# Patient Record
Sex: FEMALE | Race: WHITE | NOT HISPANIC OR LATINO | Employment: OTHER | URBAN - METROPOLITAN AREA
[De-identification: names, ages, dates, MRNs, and addresses within clinical notes are randomized per-mention and may not be internally consistent; named-entity substitution may affect disease eponyms.]

---

## 2017-01-17 ENCOUNTER — ALLSCRIPTS OFFICE VISIT (OUTPATIENT)
Dept: OTHER | Facility: OTHER | Age: 72
End: 2017-01-17

## 2017-01-17 DIAGNOSIS — E55.9 VITAMIN D DEFICIENCY: ICD-10-CM

## 2017-01-17 DIAGNOSIS — E03.9 HYPOTHYROIDISM: ICD-10-CM

## 2017-01-17 DIAGNOSIS — Z12.31 ENCOUNTER FOR SCREENING MAMMOGRAM FOR MALIGNANT NEOPLASM OF BREAST: ICD-10-CM

## 2017-01-19 ENCOUNTER — APPOINTMENT (OUTPATIENT)
Dept: LAB | Facility: CLINIC | Age: 72
End: 2017-01-19
Payer: MEDICARE

## 2017-01-19 DIAGNOSIS — E03.9 HYPOTHYROIDISM: ICD-10-CM

## 2017-01-19 DIAGNOSIS — E55.9 VITAMIN D DEFICIENCY: ICD-10-CM

## 2017-01-19 LAB
25(OH)D3 SERPL-MCNC: 25.9 NG/ML (ref 30–100)
ALBUMIN SERPL BCP-MCNC: 3.7 G/DL (ref 3.5–5)
ALP SERPL-CCNC: 105 U/L (ref 46–116)
ALT SERPL W P-5'-P-CCNC: 32 U/L (ref 12–78)
ANION GAP SERPL CALCULATED.3IONS-SCNC: 6 MMOL/L (ref 4–13)
AST SERPL W P-5'-P-CCNC: 26 U/L (ref 5–45)
BILIRUB SERPL-MCNC: 0.39 MG/DL (ref 0.2–1)
BUN SERPL-MCNC: 16 MG/DL (ref 5–25)
CALCIUM SERPL-MCNC: 8.8 MG/DL (ref 8.3–10.1)
CHLORIDE SERPL-SCNC: 104 MMOL/L (ref 100–108)
CHOLEST SERPL-MCNC: 211 MG/DL (ref 50–200)
CO2 SERPL-SCNC: 29 MMOL/L (ref 21–32)
CREAT SERPL-MCNC: 0.94 MG/DL (ref 0.6–1.3)
GFR SERPL CREATININE-BSD FRML MDRD: 59 ML/MIN/1.73SQ M
GLUCOSE SERPL-MCNC: 84 MG/DL (ref 65–140)
HDLC SERPL-MCNC: 61 MG/DL (ref 40–60)
LDLC SERPL CALC-MCNC: 121 MG/DL (ref 0–100)
POTASSIUM SERPL-SCNC: 4.6 MMOL/L (ref 3.5–5.3)
PROT SERPL-MCNC: 7.5 G/DL (ref 6.4–8.2)
SODIUM SERPL-SCNC: 139 MMOL/L (ref 136–145)
TRIGL SERPL-MCNC: 143 MG/DL
TSH SERPL DL<=0.05 MIU/L-ACNC: 6.55 UIU/ML (ref 0.36–3.74)

## 2017-01-19 PROCEDURE — 80061 LIPID PANEL: CPT

## 2017-01-19 PROCEDURE — 84443 ASSAY THYROID STIM HORMONE: CPT

## 2017-01-19 PROCEDURE — 36415 COLL VENOUS BLD VENIPUNCTURE: CPT

## 2017-01-19 PROCEDURE — 80053 COMPREHEN METABOLIC PANEL: CPT

## 2017-01-19 PROCEDURE — 82306 VITAMIN D 25 HYDROXY: CPT

## 2017-01-20 ENCOUNTER — GENERIC CONVERSION - ENCOUNTER (OUTPATIENT)
Dept: OTHER | Facility: OTHER | Age: 72
End: 2017-01-20

## 2017-02-14 ENCOUNTER — HOSPITAL ENCOUNTER (OUTPATIENT)
Dept: RADIOLOGY | Age: 72
Discharge: HOME | End: 2017-02-14
Payer: MEDICARE

## 2017-02-14 DIAGNOSIS — Z12.31 ENCOUNTER FOR SCREENING MAMMOGRAM FOR MALIGNANT NEOPLASM OF BREAST: ICD-10-CM

## 2017-02-14 PROCEDURE — G0202 SCR MAMMO BI INCL CAD: HCPCS

## 2017-02-15 ENCOUNTER — GENERIC CONVERSION - ENCOUNTER (OUTPATIENT)
Dept: OTHER | Facility: OTHER | Age: 72
End: 2017-02-15

## 2017-04-21 ENCOUNTER — GENERIC CONVERSION - ENCOUNTER (OUTPATIENT)
Dept: OTHER | Facility: OTHER | Age: 72
End: 2017-04-21

## 2017-04-21 LAB — 25(OH)D3 SERPL-MCNC: 41.9 NG/ML (ref 30–100)

## 2017-04-22 LAB — TSH SERPL DL<=0.05 MIU/L-ACNC: 5.66 UIU/ML (ref 0.45–4.5)

## 2017-04-27 ENCOUNTER — GENERIC CONVERSION - ENCOUNTER (OUTPATIENT)
Dept: OTHER | Facility: OTHER | Age: 72
End: 2017-04-27

## 2017-04-27 DIAGNOSIS — E03.9 HYPOTHYROIDISM: ICD-10-CM

## 2017-10-27 ENCOUNTER — GENERIC CONVERSION - ENCOUNTER (OUTPATIENT)
Dept: OTHER | Facility: OTHER | Age: 72
End: 2017-10-27

## 2017-10-28 LAB — TSH SERPL DL<=0.05 MIU/L-ACNC: 5.96 UIU/ML (ref 0.45–4.5)

## 2017-10-30 ENCOUNTER — GENERIC CONVERSION - ENCOUNTER (OUTPATIENT)
Dept: OTHER | Facility: OTHER | Age: 72
End: 2017-10-30

## 2018-01-11 NOTE — RESULT NOTES
Message   Mammogram stable- repeat 1 year  Verified Results  * MAMMO SCREENING BILATERAL W CAD [id number] 09:30AM Kevin Anderson Order Number: [id number]    - Patient Instructions: To schedule this appointment, please contact Central Scheduling at [phone]  Do not wear any perfume, powder, lotion or deodorant on breast or underarm area  Please bring your doctors order, referral (if needed) and insurance information with you on the day of the test  Failure to bring this information may result in this test being rescheduled  Arrive 15 minutes prior to your appointment time to register  On the day of your test, please bring any prior mammogram or breast studies with you that were not performed at a [facility]  Failure to bring prior exams may result in your test needing to be rescheduled  Test Name Result Flag Reference   MAMMO SCREENING BILATERAL W CAD (Report)     Patient History:   Patient is postmenopausal and is nulliparous  No known family history of cancer  Patient has never smoked  Patient's BMI is 28 9  Reason for exam: screening, asymptomatic  Mammo Screening Bilateral W CAD: February 14, 2017 - Check In #:    [de-identified]   Bilateral MLO, CC, and XCCL view(s) were taken  Technologist: EBONI Rushing (EBONI)(M)   Prior study comparison: December 1, 2015, digital bilateral    screening mammogram performed at [address] [place] [address]  November 25, 2014, digital bilateral screening mammogram    performed at [address] [place] [address]  November 19, 2013,    digital bilateral screening mammogram performed at [address] [place] [address]  November 21, 2012, right breast unilateral    diagnostic mammogram, performed at [address] [facility]  [facility]  November 12, 2012, digital bilateral screening mammogram   performed at [address] [place] [address]   November 7, 2011,    digital bilateral screening mammogram performed at [facility]      There are scattered fibroglandular densities  The parenchymal pattern appears stable  No dominant soft tissue    mass or suspicious calcifications are noted  The skin and nipple   contours are within normal limits  No mammographic evidence of malignancy  No    significant changes when compared with prior studies  ACR BI-RADS® Assessments: BiRad:1 - Negative     Recommendation:   Routine screening mammogram in 1 year  A reminder letter will be   scheduled  Analyzed by CAD     8-10% of cancers will be missed on mammography  Management of a    palpable abnormality must be based on clinical grounds  Patients   will be notified of their results via letter from our facility  Accredited by [employer] of [employer] and FDA       Transcription Location: [facility] 98: [id number]     Risk Value(s):   Tyrer-Cuzick 10 Year: 2 700%, Tyrer-Cuzick Lifetime: 4 700%,    Myriad Table: 1 5%, BERTRAND 5 Year: 1 9%, NCI Lifetime: 5 9%   Signed by:   Ward Jin MD   2/14/17

## 2018-01-11 NOTE — PROGRESS NOTES
Assessment    1  Medicare annual wellness visit, subsequent (V70 0) (Z00 00)   2  Situational anxiety (300 09) (F41 8)   3  Encounter for preventive health examination (V70 0) (Z00 00)   4  Essential hypertension (401 9) (I10)   5  Hyperlipidemia (272 4) (E78 5)   6  Hypothyroidism (244 9) (E03 9)   7  Screening for colon cancer (V76 51) (Z12 11)    Plan  Advance directive discussed with patient    · We recommend that you create an advance directive ; Status:Complete - Retrospective  By Protocol Authorization;   Done: 34MJW1978   · *VB - Fall Risk Assessment  (Dx Z13 89 Screen for Neurologic Disorder); Status:Active -  Retrospective Authorization; Requested OLP:[id number];    · *VB - Urinary Incontinence Screen (Dx Z13 89 Screen for UI); Status:Active -  Retrospective Authorization; Requested IUU:[id number];   Encounter for screening mammogram for breast cancer    · * MAMMO SCREENING BILATERAL W CAD; Status:Hold For - Scheduling; Requested  for:17Jan2017;   Hypothyroidism    · (1) COMPREHENSIVE METABOLIC PANEL; Status:Active; Requested AQX:[id number];    · (1) LIPID PANEL, FASTING; Status:Active; Requested PTJ:[id number];    · (1) TSH; Status:Active; Requested REP:[id number];   Hypothyroidism, Mild vitamin D deficiency    · (1) VITAMIN D 25-HYDROXY; Status:Active; Requested LFU:[id number];   Screening for colon cancer    · COLONOSCOPY; Status:Active; Requested for:[id number];   Situational anxiety    · ALPRAZolam 0 25 MG Oral Tablet; TAKE 1 TABLET TWICE DAILY AS NEEDED    Discussion/Summary    TRIAL OF ALPRAZOLAM PRN  IF NO BETTER CONSIDER ESCITALOPRAM  OBTAIN LABS  MAMMO DUE  PAP UP TO DATE  IMM UP TO DATE  FOLLOW UP 6 MONTHS BP STABLE  Impression: Subsequent Annual Wellness Visit, with preventive exam as well as age and risk appropriate counseling completed       Cardiovascular screening and counseling: counseling was given on maintaining a healthy diet, counseling was given on maintaining a healthy weight and Dx - V81 2 Screen for CV Disorder  Diabetes screening and counseling: Dx - V77 1 Screen for DM  Patient Discussion: plan discussed with the patient, follow-up visit needed in one year  Chief Complaint  PATIENT HERE FOR ANNUAL MEDICARE WELLNESS       History of Present Illness  HPI: PATIENT HERE FOR ANNUAL MEDICARE WELLNESS; SHE IS HAVING SOME ANXIETY LATELY WITH SOME SOCIAL ISSUES;   Welcome to [employer] and Wellness Visits: The patient is being seen for the subsequent annual wellness visit  Medicare Screening and Risk Factors   Hospitalizations: no previous hospitalizations  Once per lifetime medicare screening tests: ECG has not been done  Medicare Screening Tests Risk Questions   Abdominal aortic aneurysm risk assessment: none indicated  Osteoporosis risk assessment: , female gender and HAD DEXA 1 YEAR AGO  HIV risk assessment: none indicated  Drug and Alcohol Use: The patient has never smoked cigarettes  The patient reports never drinking alcohol  Alcohol concern:   The patient has no concerns about alcohol abuse  She has never used illicit drugs  Diet and Physical Activity: Current diet includes well balanced meals  She exercises daily  Exercise: walking, stretching, strength training  Mood Disorder and Cognitive Impairment Screening: She denies feeling down, depressed, or hopeless over the past two weeks  She denies feeling little interest or pleasure in doing things over the past two weeks  Cognitive impairment screening: denies difficulty learning/retaining new information, denies difficulty handling complex tasks, denies difficulty with reasoning, denies difficulty with spatial ability and orientation, denies difficulty with language and denies difficulty with behavior  Functional Ability/Level of Safety: Hearing is normal bilaterally   The patient is currently able to do activities of daily living without limitations, able to do instrumental activities of daily living without limitations, able to participate in social activities without limitations and able to drive without limitations  Activities of daily living details: does not need help using the phone, no transportation help needed, does not need help shopping, no meal preparation help needed, does not need help doing housework, does not need help doing laundry, does not need help managing medications and does not need help managing money  Home safety risk factors:  no unfamiliar surroundings, no poor household lighting, no uneven floors, no household clutter, grab bars in the bathroom and handrails on the stairs  Advance Directives: Advance directives: living will, durable power of  for health care directives and advance directives  I agree with the patient's decisions  Co-Managers and Medical Equipment/Suppliers: See Patient Care Team   Reviewed Updated [facility]:   Last Medicare Wellness Visit Information was reviewed, patient interviewed, no change since last [facility]  Preventive Quality Program 65 and Older: Falls Risk: The patient fell 0 times in the past 12 months  The patient is currently asymptomatic Symptoms Include: The patient currently has no urinary incontinence symptoms  Date of last glaucoma screen was 2016      Patient Care Team    Care Team Member Role Specialty Office Number   [address]  Optometry [phone]   Dirk Miles MD  Orthopedic Surgery [phone]     Review of Systems    Constitutional: negative and no malaise  Head and Face: no facial pressure  Eyes: WEARS GLASSES, but negative, no eye pain, eyes not red, no watery discharge from the eyes and no purulent discharge from the eyes  ENT: negative, no earache, no hearing loss, no sore throat, no scratchy throat and no hoarseness  Cardiovascular: negative, no chest pain, no palpitations, the heart is not racing and no lightheadedness     Respiratory: negative, no shortness of breath, no wheezing, not sleeping upright or with extra pillows, no cough and no productive cough  Gastrointestinal: negative, no abdominal pain, no abdominal bloating, no nausea, no vomiting, no constipation and no bright red blood per rectum  Genitourinary: negative, no dysuria and no urinary frequency  Musculoskeletal: OCC JOINT PAIN;, but negative, no diffuse joint pain, no generalized muscle aches, no joint stiffness and no pain in other joints  Integumentary and Breasts: negative, no rashes, no skin lesions, no itching, no erythema, no edema and no skin scaling  Neurological: negative, no headache, no confusion and no dizziness  Psychiatric: SITUATIONAL ANXIETY - RESOLVES, but negative and no insomnia  Endocrine: negative  Hematologic and Lymphatic: negative, no swollen glands and no swollen glands in the neck  Active Problems    1  Adhesive capsulitis of right shoulder (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 0) (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)   2  Advance directive discussed with patient (V65 49) (Z71 89)   3  Borderline osteopenia (733 90) (M85 80)   4  Essential hypertension (401 9) (I10)   5  Hyperlipidemia (272 4) (E78 5)   6  Hypothyroidism (244 9) (E03 9)   7  Medicare annual wellness visit, subsequent (V70 0) (Z00 00)   8  Moderate osteopenia (733 90) (M85 80)    Past Medical History    · History of  (637 90) (O03 9)   · History of Diverticulosis (562 10) (K57 90)   · History of  2 (V22 2) (Z33 1)   · History of Osteopenia (733 90) (M85 80)   · History of Pregnancy (V22 2) (Z33 1)    The active problems and past medical history were reviewed and updated today  Surgical History    · History of Knee Arthroscopy   · History of Neuroplasty 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   · History of Surgically Induced     The surgical history was reviewed and updated today         Family History  Mother    · Family history of Hypertension (V17 49)   · Family history of Hypothyroidism   · Family history of Osteoarthritis (V17 7)   · Family history of Stroke Syndrome (V17 1)  Father    · Family history of Diabetes Mellitus (V18 0)   · Family history of Hypertension (V17 49)   · Family history of Osteoarthritis (V17 7)   · Family history of Stroke Syndrome (V17 1)  Sister    · Family history of Hypothyroidism  Brother    · Family history of Diabetes Mellitus (V18 0)    The family history was reviewed and updated today  Social History    · Being A Social Drinker   · Caffeine Use   · Exercising Erratically   · Former smoker (E45 25) (O41 837)   · Denied: History of Uses drugs daily  The social history was reviewed and updated today  The social history was reviewed and is unchanged  Current Meds   1  Aspirin 81 MG TABS Recorded   2  Betamethasone Sod Phos & Acet 6 (3-3) MG/ML Injection Suspension; 1 ml; To Be Done:   [id number]; Status: HOLD FOR - Administration Ordered   3  Biotin 10 MG Oral Tablet Recorded   4  Calcium 600+D 600-400 MG-UNIT Oral Tablet Recorded   5  Co Q-10 CAPS Recorded   6  [employer] Fish Oil CAPS Recorded   7  [employer]S Vitamin E CAPS Recorded   8  Ferrous Sulfate 325 (65 Fe) MG Oral Tablet Recorded   9  Lisinopril 10 MG Oral Tablet; take 1 tablet by mouth every day; Therapy: 14MPG8576 to ([facility])  Requested for: 46DWE0222; Last   Rx:[id number] Ordered   10  Magnesium Citrate TABS Recorded   11  Rosuvastatin Calcium 10 MG Oral Tablet; TAKE AS DIRECTED 2 DAYS EACH WEEK; Therapy: 61AWZ3860 to (Last Rx:27Gdg0506)  Requested for: 02Tgx1892 Ordered   12  Vitamin C 250 MG Oral Tablet Recorded    Allergies    1  Bactrim TABS   2   Penicillins    Immunizations   1 2 3 4    Influenza  08-Jan-2013  (67y) 19-Dec-2013  (68y) 22-Dec-2014  (69y) 23-Dec-2015  (70y)    PCV  23-Dec-2015  (70y)       PPSV  19-Dec-2013  (68y)       Td/DT  28-Jan-2014  (68y)       Varicella  2010        Vitals  Signs    Temperature: 96 6 F  Heart Rate: 62  Respiration: 14  Systolic: 498  Diastolic: 72  Height: 5 ft   Weight: 148 lb 12 8 oz  BMI Calculated: 29 06  BSA Calculated: 1 64    Physical Exam    Constitutional   General appearance: No acute distress, well appearing and well nourished  well developed, appears healthy and well nourished  Eyes   Conjunctiva and lids: No swelling, erythema or discharge  Pupils and irises: Equal, round, reactive to light  Pupils: equal, round, and reactive to light bilaterally  Cornea, Lens, and Sclera: Bilateral eyes: normal    Ears, Nose, Mouth, and Throat   External inspection of ears and nose: Normal     Otoscopic examination: Tympanic membranes translucent with normal light reflex  Canals patent without erythema  Hearing: Normal   HEARING AIDS /B/L WITH CERUMEIN -CERUMEN REMOVED B/L WITH NORMAL TM B/L  Nasal mucosa, septum, and turbinates: Normal without edema or erythema  Lips, teeth, and gums: Normal, good dentition  Oropharynx: Normal with no erythema, edema, exudate or lesions  Inspection of the oropharynx showed fully visible tonsils, uvula and soft palate (Mallampati class 1)  The palate examination showed no abnormalities  The tongue was normal  The tonsils were normal    Neck   Neck: Supple, symmetric, trachea midline, no masses  Thyroid: Normal, no thyromegaly  NO THYROMEGALY  Pulmonary   Respiratory effort: No increased work of breathing or signs of respiratory distress  Percussion of chest: Normal     Auscultation of lungs: Clear to auscultation  Auscultation of the lungs revealed no expiratory wheezing, normal expiratory time and no inspiratory wheezing  no rales or crackles were heard bilaterally  no rhonchi  no friction rub  no wheezing  no diminished breath sounds  no bronchial breath sounds  Cardiovascular   Palpation of heart: Normal PMI, no thrills  Auscultation of heart: Normal rate and rhythm, normal S1 and S2, no murmurs  The heart rate was normal  The rhythm was regular  Heart sounds: normal S1, normal S2, no S3 and no S4  no murmurs were heard  Carotid pulses: 2+ bilaterally    NO BRUITS NOTED  Abdominal aorta: Normal     Femoral pulses: 2+ bilaterally  Pedal pulses: 2+ bilaterally  Examination of extremities for edema and/or varicosities: Normal     Abdomen   Abdomen: Non-tender, no masses  Liver and spleen: No hepatomegaly or splenomegaly  Lymphatic   Palpation of lymph nodes in neck: No lymphadenopathy  Musculoskeletal   Gait and station: Normal     Digits and nails: Normal without clubbing or cyanosis  Joints, bones, and muscles: Normal     Range of motion: Normal     Stability: Normal     Muscle strength/tone: Normal     Skin   Skin and subcutaneous tissue: Normal without rashes or lesions  Palpation of skin and subcutaneous tissue: Normal turgor  Neurologic   Cranial nerves: Cranial nerves II-XII intact  Reflexes: 2+ and symmetric  Sensation: No sensory loss  Psychiatric   Judgment and insight: Normal     Orientation to person, place, and time: Normal     Recent and remote memory: Intact  Mood and affect: Normal        Results/Data  PHQ-9 Adult Depression Screening [id number] 09:32AM User, [facility]     Test Name Result Flag Reference   PHQ-9 Adult Depression Score 8     Over the last two weeks, how often have you been bothered by any of the following problems? Little interest or pleasure in doing things: Not at all - 0  Feeling down, depressed, or hopeless: Several days - 1  Trouble falling or staying asleep, or sleeping too much: Several days - 1  Feeling tired or having little energy: Several days - 1  Poor appetite or over eating: Not at all - 0  Feeling bad about yourself - or that you are a failure or have let yourself or your family down: More than half the days - 2  Trouble concentrating on things, such as reading the newspaper or watching television: Several days - 1  Moving or speaking so slowly that other people could have noticed   Or the opposite -  being so fidgety or restless that you have been moving around a lot more than usual: Not at all - 0  Thoughts that you would be better off dead, or of hurting yourself in some way: More than half the days - 2   PHQ-9 Adult Depression Screening Negative     PHQ-9 Difficulty Level Somewhat difficult     PHQ-9 Severity Mild Depression         Signatures   Electronically signed by :  Chris Venegas DO; Jan 17 2017 10:18AM EST                       (Author)

## 2018-01-13 VITALS
HEART RATE: 62 BPM | HEIGHT: 60 IN | BODY MASS INDEX: 29.21 KG/M2 | DIASTOLIC BLOOD PRESSURE: 72 MMHG | SYSTOLIC BLOOD PRESSURE: 136 MMHG | RESPIRATION RATE: 14 BRPM | TEMPERATURE: 96.6 F | WEIGHT: 148.8 LBS

## 2018-01-13 NOTE — RESULT NOTES
Verified Results  (1) TSH 21Apr2017 07:56AM Matheus Morales     Test Name Result Flag Reference   TSH 5 660 uIU/mL H 0 450-4 500     (1) VITAMIN D 25-HYDROXY 21Apr2017 07:56AM Matheus Morales     Test Name Result Flag Reference   Vitamin D, 25-Hydroxy 41 9 ng/mL  30 0-100 0   Vitamin D deficiency has been defined by the [facility] [address] practice guideline as a  level of serum 25-OH vitamin D less than 20 ng/mL (1,2)  The Endocrine Society went on to further define vitamin D  insufficiency as a level between 21 and 29 ng/mL (2)  1  IOM ([employer])  2010  Dietary reference     intakes for calcium and D  [facility]: The     1[employer]Way  2  Randy MF, Ana María KEANE, Daisy MONK, et al      Evaluation, treatment, and prevention of vitamin D     deficiency: an Endocrine Society clinical practice     guideline  JCEM  2011 Jul; 96(7):1911-30  Plan  Hypothyroidism    · (1) TSH; Status:Active;  Requested for:27Apr2017;

## 2018-01-15 NOTE — RESULT NOTES
Message   PLS SEND RESULT LETTER     Verified Results  (1) COMPREHENSIVE METABOLIC PANEL [id number] 93:50EB Laura [place]     Test Name Result Flag Reference   GLUCOSE,RANDM 87 mg/dL     If the patient is fasting, the ADA then defines impaired fasting glucose as > 100 mg/dL and diabetes as > or equal to 123 mg/dL  SODIUM 138 mmol/L  136-145   POTASSIUM 4 2 mmol/L  3 5-5 3   CHLORIDE 103 mmol/L  100-108   CARBON DIOXIDE 29 mmol/L  21-32   ANION GAP (CALC) 6 mmol/L  4-13   BLOOD UREA NITROGEN 21 mg/dL  5-25   CREATININE 0 83 mg/dL  0 60-1 30   Standardized to IDMS reference method   CALCIUM 8 6 mg/dL  8 3-10 1   BILI, TOTAL 0 68 mg/dL  0 20-1 00   ALK PHOSPHATAS 104 U/L     ALT (SGPT) 28 U/L  12-78   AST(SGOT) 20 U/L  5-45   ALBUMIN 3 6 g/dL  3 5-5 0   TOTAL PROTEIN 6 8 g/dL  6 4-8 2   eGFR Non-African American      >60 0 ml/min/1 73sq [facility] Disease Education Program recommendations are as follows:  GFR calculation is accurate only with a steady state creatinine  Chronic Kidney disease less than 60 ml/min/1 73 sq  meters  Kidney failure less than 15 ml/min/1 73 sq  meters  (1) LIPID PANEL, FASTING [id number] 09:32AM Laura Matt     Test Name Result Flag Reference   CHOLESTEROL 206 mg/dL H    HDL,DIRECT 49 mg/dL  40-60   Specimen collection should occur prior to Metamizole administration due to the potential for falsely depressed results  LDL CHOLESTEROL CALCULATED 130 mg/dL H 0-100   Triglyceride:         Normal              <150 mg/dl       Borderline High    150-199 mg/dl       High               200-499 mg/dl       Very High          >499 mg/dl  Cholesterol:         Desirable        <200 mg/dl      Borderline High  200-239 mg/dl      High             >239 mg/dl  HDL Cholesterol:        High    >59 mg/dL      Low     <41 mg/dL  LDL CALCULATED:    This screening LDL is a calculated result    It does not have the accuracy of the Direct Measured LDL in the monitoring of patients with hyperlipidemia and/or statin therapy  Direct Measure LDL (GGD605) must be ordered separately in these patients  TRIGLYCERIDES 134 mg/dL  <=150   Specimen collection should occur prior to N-Acetylcysteine or Metamizole administration due to the potential for falsely depressed results  (1) TSH [id number] 09:32AM [facility]     Test Name Result Flag Reference   TSH 3 150 uIU/mL  0 358-3 740   Patients undergoing fluorescein dye angiography may retain small amounts of fluorescein in the body for 48-72 hours post procedure  Samples containing fluorescein can produce falsely depressed TSH values  If the patient had this procedure,a specimen should be resubmitted post fluorescein clearance  The recommended reference ranges for TSH during pregnancy are as follows:  First trimester 0 1 to 2 5 uIU/mL  Second trimester  0 2 to 3 0 uIU/mL  Third trimester 0 3 to 3 0 uIU/m       Discussion/Summary   LABS ARE STABLE

## 2018-01-15 NOTE — RESULT NOTES
Discussion/Summary   Thyroid level remains just about the same- we can continue to monitor it or start oral medications if you have any symptoms of hypothyroidism  Verified Results  (1) TSH 27Oct2017 08:23AM Fadi Agustintal     Test Name Result Flag Reference   TSH 5 960 uIU/mL H 0 450-4 500       Plan  Hypothyroidism    · (1) TSH WITH FT4 REFLEX; Status:Active;  Requested for:30Apr2018;

## 2018-01-16 NOTE — RESULT NOTES
Message   TSH is slightly elevated -  repeat in 3 months;  vitamin d is low- add 2000 iu daily and follow up labs in 3 months  Verified Results  (1) COMPREHENSIVE METABOLIC PANEL [id number] 77:95YD [place] Ort   TW Order Number: [id number]     Test Name Result Flag Reference   GLUCOSE,RANDM 84 mg/dL     If the patient is fasting, the ADA then defines impaired fasting glucose as > 100 mg/dL and diabetes as > or equal to 123 mg/dL  SODIUM 139 mmol/L  136-145   POTASSIUM 4 6 mmol/L  3 5-5 3   CHLORIDE 104 mmol/L  100-108   CARBON DIOXIDE 29 mmol/L  21-32   ANION GAP (CALC) 6 mmol/L  4-13   BLOOD UREA NITROGEN 16 mg/dL  5-25   CREATININE 0 94 mg/dL  0 60-1 30   Standardized to IDMS reference method   CALCIUM 8 8 mg/dL  8 3-10 1   BILI, TOTAL 0 39 mg/dL  0 20-1 00   ALK PHOSPHATAS 105 U/L     ALT (SGPT) 32 U/L  12-78   AST(SGOT) 26 U/L  5-45   ALBUMIN 3 7 g/dL  3 5-5 0   TOTAL PROTEIN 7 5 g/dL  6 4-8 2   eGFR Non-African American 59 0 ml/min/1 73sq m     - Patient Instructions: This is a fasting blood test  Water,black tea or black  coffee only after 9:00pm the night before test Drink 2 glasses of water the morning of test - Patient Instructions: This bloodwork is non-fasting  Please drink two glasses of   water morning of bloodwork  National Kidney Disease Education Program recommendations are as follows:  GFR calculation is accurate only with a steady state creatinine  Chronic Kidney disease less than 60 ml/min/1 73 sq  meters  Kidney failure less than 15 ml/min/1 73 sq  meters  (1) LIPID PANEL, FASTING [id number] 07:49AM Sebastian Ort   TW Order Number: [id number]     Test Name Result Flag Reference   CHOLESTEROL 211 mg/dL H    HDL,DIRECT 61 mg/dL H 40-60   Specimen collection should occur prior to Metamizole administration due to the potential for falsely depressed results  LDL CHOLESTEROL CALCULATED 121 mg/dL H 0-100   - Patient Instructions:  This is a fasting blood test  Water,black tea or black  coffee only after 9:00pm the night before test   Drink 2 glasses of water the morning of test     - Patient Instructions: This is a fasting blood test  Water,black tea or black  coffee only after 9:00pm the night before test Drink 2 glasses of water the morning of test - Patient Instructions: This bloodwork is non-fasting  Please drink two glasses of   water morning of bloodwork  Triglyceride:         Normal              <150 mg/dl       Borderline High    150-199 mg/dl       High               200-499 mg/dl       Very High          >499 mg/dl  Cholesterol:         Desirable        <200 mg/dl      Borderline High  200-239 mg/dl      High             >239 mg/dl  HDL Cholesterol:        High    >59 mg/dL      Low     <41 mg/dL  LDL CALCULATED:    This screening LDL is a calculated result  It does not have the accuracy of the Direct Measured LDL in the monitoring of patients with hyperlipidemia and/or statin therapy  Direct Measure LDL (XQN029) must be ordered separately in these patients  TRIGLYCERIDES 143 mg/dL  <=150   Specimen collection should occur prior to N-Acetylcysteine or Metamizole administration due to the potential for falsely depressed results  (1) TSH [id number] 07:49AM Bishop Singh    Order Number: [id number]     Test Name Result Flag Reference   TSH 6 550 uIU/mL H 0 358-3 740   - Patient Instructions: This bloodwork is non-fasting  Please drink two glasses of water morning of bloodwork  - Patient Instructions: This is a fasting blood test  Water,black tea or black  coffee only after 9:00pm the night before test Drink 2 glasses of water the morning of test - Patient Instructions: This bloodwork is non-fasting  Please drink two glasses of   water morning of bloodwork  Patients undergoing fluorescein dye angiography may retain small amounts of fluorescein in the body for 48-72 hours post procedure   Samples containing fluorescein can produce falsely depressed TSH values  If the patient had this procedure,a specimen should be resubmitted post fluorescein clearance  The recommended reference ranges for TSH during pregnancy are as follows:  First trimester 0 1 to 2 5 uIU/mL  Second trimester  0 2 to 3 0 uIU/mL  Third trimester 0 3 to 3 0 uIU/m     (1) VITAMIN D 25-HYDROXY [id number] 07:49AM Ivet Posada    Order Number: [id number]     Test Name Result Flag Reference   VIT D 25-HYDROX 25 9 ng/mL L 30 0-100 0   This assay is a certified procedure of the CDC Vitamin D Standardization Certification Program (VDSCP)     Deficiency <20ng/ml   Insufficiency 20-30ng/ml   Sufficient  ng/ml     *Patients undergoing fluorescein dye angiography may retain small amounts of fluorescein in the body for 48-72 hours post procedure  Samples containing fluorescein can produce falsely elevated Vitamin D values  If the patient had this procedure, a specimen should be resubmitted post fluorescein clearance  Plan  Hypothyroidism    · (1) TSH; Status:Active; Requested DZL:[id number];   Mild vitamin D deficiency    · (1) VITAMIN D 25-HYDROXY; Status:Active;  Requested for:20Apr2017;

## 2018-01-18 ENCOUNTER — ALLSCRIPTS OFFICE VISIT (OUTPATIENT)
Dept: OTHER | Facility: OTHER | Age: 73
End: 2018-01-18

## 2018-01-18 DIAGNOSIS — I10 ESSENTIAL (PRIMARY) HYPERTENSION: ICD-10-CM

## 2018-01-18 DIAGNOSIS — E55.9 VITAMIN D DEFICIENCY: ICD-10-CM

## 2018-01-18 DIAGNOSIS — Z12.31 ENCOUNTER FOR SCREENING MAMMOGRAM FOR MALIGNANT NEOPLASM OF BREAST: ICD-10-CM

## 2018-01-18 DIAGNOSIS — E78.5 HYPERLIPIDEMIA: ICD-10-CM

## 2018-01-18 DIAGNOSIS — E03.9 HYPOTHYROIDISM: ICD-10-CM

## 2018-01-23 VITALS
WEIGHT: 148 LBS | RESPIRATION RATE: 16 BRPM | DIASTOLIC BLOOD PRESSURE: 76 MMHG | TEMPERATURE: 95.1 F | BODY MASS INDEX: 29.06 KG/M2 | HEIGHT: 60 IN | SYSTOLIC BLOOD PRESSURE: 148 MMHG | HEART RATE: 78 BPM

## 2018-01-23 NOTE — PROGRESS NOTES
Assessment    1  Medicare annual wellness visit, subsequent (V70 0) (Z00 00)   2  Hyperlipidemia (272 4) (E78 5)   3  Hypothyroidism (244 9) (E03 9)   4  Mild vitamin D deficiency (268 9) (E55 9)    Plan  Encounter for screening mammogram for breast cancer    · * MAMMO SCREENING BILATERAL W CAD; Status:Hold For - Scheduling; Requested  for:18Jan2018;   Essential hypertension, Hyperlipidemia, Hypothyroidism, Mild vitamin D deficiency    · (1) LIPID PANEL, FASTING; Status:Active; Requested for:18Jan2018;   Hypothyroidism, Mild vitamin D deficiency    · (1) TSH; Status:Active; Requested for:18Jan2018;   Mild vitamin D deficiency    · (1) COMPREHENSIVE METABOLIC PANEL; Status:Active; Requested ROSANNA:[id number];    · (1) VITAMIN D 25-HYDROXY; Status:Active; Requested GZK:[id number]; Discussion/Summary    OBTAIN LABS  CONTINUE WITH ALPRAZOLAM BID  LIPIDS- STABLE  ANXIETY- STABLE ON ALPRAZOLAM BID FOR ANXIETY; AVOID USE OF SSRI DUE TO FAMILY HX OF BIPOLAR DISORDER  HTN- STABLE- ON LISINOPRIL  ON ASA  ON STATIN   PAIN LEFT L4 RADIATES TO HIP- RESOLVED- CALL IF WORSE; ;    Impression: Subsequent Annual Wellness Visit, with preventive exam as well as age and risk appropriate counseling completed  Cardiovascular screening and counseling: counseling was given on maintaining a healthy diet, counseling was given on maintaining a healthy weight and Dx - V81 2 Screen for CV Disorder  Diabetes screening and counseling: the risks and benefits of screening were discussed, screening is current and Dx - V77 1 Screen for DM  Colorectal cancer screening and counseling: Dx - V76 51 Screen for CRC  Abdominal aortic aneurysm screening and counseling: screening is current and Dx - V81 2 Screen for CV Disorder  Glaucoma screening and counseling: screening is current and Dx - V80 1 Screen for Glaucoma  Hepatitis C Screening:  The patient declines Hepatitis C screening   Immunizations: the risks and benefits of influenza vaccination were discussed with the patient, influenza vaccine is up to date this year, influenza vaccination is recommended annually, the lifetime pneumococcal vaccine has been completed and Zostavax vaccination up to date  Advance Directive Planning: complete and up to date, she was encouraged to follow-up with me to discuss her questions and/or decisions  Patient Discussion: plan discussed with the patient, follow-up visit needed in one year  Chief Complaint  PATIENT HERE FOR AMW  SHE FEELS WELL       History of Present Illness  HPI: PATIENT HERE FOR AMW  SHE FEELS WELL   Welcome to [employer] and Wellness Visits: The patient is being seen for the subsequent annual wellness visit  Medicare Screening and Risk Factors   Hospitalizations: no previous hospitalizations  Once per lifetime medicare screening tests: ECG has not been done  Medicare Screening Tests Risk Questions   Abdominal aortic aneurysm risk assessment: none indicated  Osteoporosis risk assessment: , female gender and HAD DEXA 1 YEAR AGO  HIV risk assessment: none indicated  Drug and Alcohol Use: The patient has never smoked cigarettes  The patient reports never drinking alcohol  Alcohol concern:   The patient has no concerns about alcohol abuse  She has never used illicit drugs  Diet and Physical Activity: Current diet includes well balanced meals  She exercises daily  Exercise: walking, stretching, strength training  Mood Disorder and Cognitive Impairment Screening: She denies feeling down, depressed, or hopeless over the past two weeks  She denies feeling little interest or pleasure in doing things over the past two weeks  Cognitive impairment screening: denies difficulty learning/retaining new information, denies difficulty handling complex tasks, denies difficulty with reasoning, denies difficulty with spatial ability and orientation, denies difficulty with language and denies difficulty with behavior     Functional Ability/Level of Safety: Hearing is normal bilaterally  The patient is currently able to do activities of daily living without limitations, able to do instrumental activities of daily living without limitations, able to participate in social activities without limitations and able to drive without limitations  Activities of daily living details: does not need help using the phone, no transportation help needed, does not need help shopping, no meal preparation help needed, does not need help doing housework, does not need help doing laundry and does not need help managing medications  Fall risk factors: The patient fell 0 times in the past 12 months  1    Home safety risk factors:  no unfamiliar surroundings, no loose rugs, no poor household lighting, no uneven floors, no household clutter, grab bars in the bathroom and handrails on the stairs  Advance Directives: Advance directives: living will, durable power of  for health care directives and advance directives  I agree with the patient's decisions  Co-Managers and Medical Equipment/Suppliers: See Patient Care Team   Reviewed Updated [facility]:   Last Medicare Wellness Visit Information was reviewed, patient interviewed, no change since last AW  Preventive Quality Program 65 and Older: Falls Risk: The patient fell 0 times in the past 12 months  The patient currently has no urinary incontinence symptoms  Urinary Incontinence Symptoms includes: no urinary incontinence, no incomplete bladder emptying and no urinary frequency    Date of last glaucoma screen was 2017      Patient Care Team    Care Team Member Role Specialty Office Number   [address] [facility] [phone]   Guilherme Delarosa MD  Orthopedic Surgery [phone]     Review of Systems    Constitutional: negative and no malaise  Head and Face: no facial pressure     Eyes: WEARS GLASSES, but negative, no eye pain, eyes not red, no watery discharge from the eyes and no purulent discharge from the eyes  ENT: negative, no earache, no hearing loss, no sore throat, no scratchy throat and no hoarseness  Cardiovascular: negative, no chest pain, no palpitations, the heart is not racing and no lightheadedness  Respiratory: negative, no shortness of breath, no wheezing, not sleeping upright or with extra pillows, no cough and no productive cough  Gastrointestinal: negative, no abdominal pain, no abdominal bloating, no nausea, no vomiting, no constipation and no bright red blood per rectum  Genitourinary: negative, no dysuria and no urinary frequency  Musculoskeletal: OCC JOINT PAIN;, but negative, no diffuse joint pain, no generalized muscle aches, no joint stiffness and no pain in other joints  Integumentary and Breasts: negative, no rashes, no skin lesions, no itching, no erythema, no edema and no skin scaling  Neurological: negative, no headache, no confusion and no dizziness  Psychiatric: SITUATIONAL ANXIETY - RESOLVES, but negative and no insomnia  Endocrine: negative  Hematologic and Lymphatic: negative, no swollen glands and no swollen glands in the neck  Over the past 2 weeks, how often have you been bothered by the following problems? 1 ) Little interest or pleasure in doing things? Not at all    2 ) Feeling down, depressed or hopeless? Not at all    3 ) Trouble falling asleep or sleeping too much? Not at all    4 ) Feeling tired or having little energy? Not at all    5 ) Poor appetite or overeating? Not at all    6 ) Feeling bad about yourself, or that you are a failure, or have let yourself or your family down? Not at all    7 ) Trouble concentrating on things, such as reading a newspaper or watching television? Not at all    8 ) Moving or speaking so slowly that other people could have noticed, or the opposite, moving or speaking faster than usual? Not at all     How difficult have these problems made it for you to do your work, take care of things at home, or get along with people? 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)   2  Advance directive discussed with patient (V65 49) (Z71 89)   3  Borderline osteopenia (733 90) (M85 80)   4  Encounter for screening mammogram for breast cancer (V76 12) (Z12 31)   5  Essential hypertension (401 9) (I10)   6  Hyperlipidemia (272 4) (E78 5)   7  Hypothyroidism (244 9) (E03 9)   8  Medicare annual wellness visit, subsequent (V70 0) (Z00 00)   9  Mild vitamin D deficiency (268 9) (E55 9)   10  Moderate osteopenia (733 90) (M85 80)   11  Need for prophylactic vaccination and inoculation against influenza (V04 81) (Z23)   12  Screening for colon cancer (V76 51) (Z12 11)   13  Situational anxiety (300 09) (F41 8)    Past Medical History    · History of  (637 90) (O03 9)   · History of Diverticulosis (562 10) (K57 90)   · History of  2 (V22 2) (Z33 1)   · History of Osteopenia (733 90) (M85 80)   · History of Pregnancy (V22 2) (Z34 90)    The active problems and past medical history were reviewed and updated today  Surgical History    · History of Knee Arthroscopy (Therapeutic)   · History of Neuroplasty 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   · History of Surgically Induced     The surgical history was reviewed and updated today         Family History  Mother    · Family history of Hypertension (V17 49)   · Family history of Hypothyroidism   · Family history of Osteoarthritis (V17 7)   · Family history of Stroke Syndrome (V17 1)  Father    · Family history of Diabetes Mellitus (V18 0)   · Family history of Hypertension (V17 49)   · Family history of Osteoarthritis (V17 7)   · Family history of Stroke Syndrome (V17 1)  Sister    · Family history of Hypothyroidism  Brother    · Family history of Diabetes Mellitus (V18 0)    Social History    · Being A Social Drinker   · Caffeine Use   · Exercising Erratically   · Former smoker (V15 82) (G53 226)   · Denied: History of Uses drugs daily  The social history was reviewed and updated today  The social history was reviewed and is unchanged  Current Meds   1  ALPRAZolam 0 25 MG Oral Tablet; TAKE 1 TABLET TWICE DAILY AS NEEDED; Therapy: 60SFG9744 to (Evaluate:31Obt8947); Last Rx:11Jan2018 Ordered   2  Aspirin 81 MG TABS Recorded   3  Biotin 10 MG Oral Tablet Recorded   4  Calcium 600+D 600-400 MG-UNIT Oral Tablet Recorded   5  Co Q-10 CAPS Recorded   6  [facility] Fish Oil CAPS Recorded   7  [facility] Vitamin E CAPS Recorded   8  Ferrous Sulfate 325 (65 Fe) MG Oral Tablet Recorded   9  Lisinopril 10 MG Oral Tablet; take 1 tablet by mouth every day; Therapy: 91KBX2368 to ([phone])  Requested for: 61JBL1165; Last   XK:[id number] Ordered   10  Magnesium Citrate TABS Recorded   11  Rosuvastatin Calcium 10 MG Oral Tablet; TAKE AS DIRECTED 2 DAYS EACH WEEK; Therapy: 56IHQ3845 to (Last Manan Postin)  Requested for: 64Uqs0609 Ordered   12  Vitamin C 250 MG Oral Tablet Recorded    The medication list was reviewed and updated today  Allergies    1  Bactrim TABS   2  Penicillins    Immunizations   ** Printed in Appendix #1 below  Vitals  Signs    Temperature: 95 1 F  Heart Rate: 78  Respiration: 16  Systolic: 357  Diastolic: 76  Height: 5 ft   Weight: 148 lb   BMI Calculated: 28 9  BSA Calculated: 1 64    Physical Exam    Constitutional   General appearance: No acute distress, well appearing and well nourished  well developed, appears healthy and well nourished  Eyes   Conjunctiva and lids: No swelling, erythema or discharge  Pupils and irises: Equal, round, reactive to light  Pupils: equal, round, and reactive to light bilaterally  Cornea, Lens, and Sclera: Bilateral eyes: normal    Ears, Nose, Mouth, and Throat   External inspection of ears and nose: Normal     Otoscopic examination: Tympanic membranes translucent with normal light reflex  Canals patent without erythema      Hearing: Normal  HEARING AIDS /B/L WITH CERUMEIN -CERUMEN REMOVED B/L WITH NORMAL TM B/L  Nasal mucosa, septum, and turbinates: Normal without edema or erythema  Lips, teeth, and gums: Normal, good dentition  Oropharynx: Normal with no erythema, edema, exudate or lesions  Inspection of the oropharynx showed fully visible tonsils, uvula and soft palate (Mallampati class 1)  The palate examination showed no abnormalities  The tongue was normal  The tonsils were normal    Neck   Neck: Supple, symmetric, trachea midline, no masses  Thyroid: Normal, no thyromegaly  NO THYROMEGALY  Pulmonary   Respiratory effort: No increased work of breathing or signs of respiratory distress  Percussion of chest: Normal     Auscultation of lungs: Clear to auscultation  Auscultation of the lungs revealed no expiratory wheezing, normal expiratory time and no inspiratory wheezing  no rales or crackles were heard bilaterally  no rhonchi  no friction rub  no wheezing  no diminished breath sounds  no bronchial breath sounds  Cardiovascular   Palpation of heart: Normal PMI, no thrills  Auscultation of heart: Normal rate and rhythm, normal S1 and S2, no murmurs  The heart rate was normal  The rhythm was regular  Heart sounds: normal S1, normal S2, no S3 and no S4  no murmurs were heard  Carotid pulses: 2+ bilaterally  NO BRUITS NOTED  Abdominal aorta: Normal     Femoral pulses: 2+ bilaterally  Pedal pulses: 2+ bilaterally  Examination of extremities for edema and/or varicosities: Normal     Abdomen   Abdomen: Non-tender, no masses  Liver and spleen: No hepatomegaly or splenomegaly  Lymphatic   Palpation of lymph nodes in neck: No lymphadenopathy  Musculoskeletal   Gait and station: Normal     Digits and nails: Normal without clubbing or cyanosis      Joints, bones, and muscles: Normal     Range of motion: Normal     Stability: Normal     Muscle strength/tone: Normal     Skin   Skin and subcutaneous tissue: Normal without rashes or lesions  Palpation of skin and subcutaneous tissue: Normal turgor  Neurologic   Cranial nerves: Cranial nerves II-XII intact  Reflexes: 2+ and symmetric  Sensation: No sensory loss  Psychiatric   Judgment and insight: Normal     Orientation to person, place, and time: Normal     Recent and remote memory: Intact  Mood and affect: Normal        Signatures   Electronically signed by :  Chris 89, DO; 2018  9:49AM EST                       (Author)    Appendix #1     Patient: Marietta Pacheco ; : 1945; MRN: [id number]      1 2 3 4 5    Influenza  2013  (67y) 19-Dec-2013  (68y) 22-Dec-2014  (69y) 23-Dec-2015  (70y) 2017  (71y)    PCV  23-Dec-2015  (70y)        PPSV  19-Dec-2013  (68y)        Td/DT  2014  (68y)        Varicella  2010

## 2018-01-25 LAB
25(OH)D3+25(OH)D2 SERPL-MCNC: 51.7 NG/ML (ref 30–100)
ALBUMIN SERPL-MCNC: 4.4 G/DL (ref 3.5–4.8)
ALBUMIN/GLOB SERPL: 1.5 {RATIO} (ref 1.2–2.2)
ALP SERPL-CCNC: 106 IU/L (ref 39–117)
ALT SERPL-CCNC: 25 IU/L (ref 0–32)
AMBIG ABBREV DEFAULT: NORMAL
AMBIG ABBREV DEFAULT: NORMAL
AST SERPL-CCNC: 26 IU/L (ref 0–40)
BILIRUB SERPL-MCNC: 0.4 MG/DL (ref 0–1.2)
BUN SERPL-MCNC: 15 MG/DL (ref 8–27)
BUN/CREAT SERPL: 18 (ref 12–28)
CALCIUM SERPL-MCNC: 9.2 MG/DL (ref 8.7–10.3)
CHLORIDE SERPL-SCNC: 104 MMOL/L (ref 96–106)
CHOLEST SERPL-MCNC: 238 MG/DL (ref 100–199)
CO2 SERPL-SCNC: 25 MMOL/L (ref 18–29)
CREAT SERPL-MCNC: 0.85 MG/DL (ref 0.57–1)
GLOBULIN SER-MCNC: 2.9 G/DL (ref 1.5–4.5)
GLUCOSE SERPL-MCNC: 90 MG/DL (ref 65–99)
HDLC SERPL-MCNC: 56 MG/DL
LDLC SERPL CALC-MCNC: 149 MG/DL (ref 0–99)
POTASSIUM SERPL-SCNC: 4.4 MMOL/L (ref 3.5–5.2)
PROT SERPL-MCNC: 7.3 G/DL (ref 6–8.5)
SL AMB EGFR AFRICAN AMERICAN: 80 ML/MIN/1.73
SL AMB EGFR NON AFRICAN AMERICAN: 70 ML/MIN/1.73
SL AMB VLDL CHOLESTEROL CALC: 33 MG/DL (ref 5–40)
SODIUM SERPL-SCNC: 144 MMOL/L (ref 134–144)
TRIGL SERPL-MCNC: 165 MG/DL (ref 0–149)
TSH SERPL DL<=0.005 MIU/L-ACNC: 4.44 UIU/ML (ref 0.45–4.5)

## 2018-02-06 DIAGNOSIS — E78.01 FAMILIAL HYPERCHOLESTEROLEMIA: Primary | ICD-10-CM

## 2018-02-06 RX ORDER — ROSUVASTATIN CALCIUM 10 MG/1
TABLET, COATED ORAL
Qty: 24 TABLET | Refills: 1 | Status: SHIPPED | OUTPATIENT
Start: 2018-02-06 | End: 2019-01-30 | Stop reason: SDUPTHER

## 2018-02-12 ENCOUNTER — TRANSCRIBE ORDERS (OUTPATIENT)
Dept: RADIOLOGY | Facility: CLINIC | Age: 73
End: 2018-02-12

## 2018-02-15 ENCOUNTER — HOSPITAL ENCOUNTER (OUTPATIENT)
Dept: RADIOLOGY | Age: 73
Discharge: HOME | End: 2018-02-15
Payer: MEDICARE

## 2018-02-15 ENCOUNTER — TELEPHONE (OUTPATIENT)
Dept: FAMILY MEDICINE CLINIC | Facility: CLINIC | Age: 73
End: 2018-02-15

## 2018-02-15 DIAGNOSIS — Z12.31 ENCOUNTER FOR SCREENING MAMMOGRAM FOR MALIGNANT NEOPLASM OF BREAST: ICD-10-CM

## 2018-02-15 PROCEDURE — 77067 SCR MAMMO BI INCL CAD: CPT

## 2018-02-16 DIAGNOSIS — F41.9 ANXIETY: Primary | ICD-10-CM

## 2018-02-16 RX ORDER — ALPRAZOLAM 0.25 MG/1
1 TABLET ORAL 2 TIMES DAILY PRN
COMMUNITY
Start: 2017-01-17 | End: 2018-02-16 | Stop reason: SDUPTHER

## 2018-02-16 RX ORDER — ALPRAZOLAM 0.25 MG/1
0.25 TABLET ORAL 2 TIMES DAILY PRN
Qty: 180 TABLET | Refills: 0 | Status: SHIPPED | OUTPATIENT
Start: 2018-02-16 | End: 2018-05-24 | Stop reason: SDUPTHER

## 2018-02-16 NOTE — TELEPHONE ENCOUNTER
Patient needs refill on Alprazolam 0 25 mg bid # 180 sent to [facility]   She has signed controlled substance letter

## 2018-03-09 PROBLEM — D03.72 MELANOMA IN SITU OF LEFT LOWER EXTREMITY (HCC): Status: ACTIVE | Noted: 2018-03-09

## 2018-03-09 PROBLEM — F41.8 SITUATIONAL ANXIETY: Status: ACTIVE | Noted: 2017-01-17

## 2018-03-09 PROBLEM — E55.9 MILD VITAMIN D DEFICIENCY: Status: ACTIVE | Noted: 2017-01-17

## 2018-03-09 RX ORDER — FERROUS SULFATE 325(65) MG
325 TABLET ORAL
COMMUNITY
End: 2020-01-28

## 2018-03-09 RX ORDER — DIMENHYDRINATE 50 MG
100 TABLET ORAL DAILY
COMMUNITY

## 2018-03-09 RX ORDER — MULTIVIT WITH MINERALS/LUTEIN
TABLET ORAL DAILY
COMMUNITY
End: 2019-01-23

## 2018-03-09 RX ORDER — MULTIVIT WITH MINERALS/LUTEIN
250 TABLET ORAL DAILY
COMMUNITY

## 2018-03-09 RX ORDER — CALCIUM CARBONATE 260MG(650)
1 TABLET,CHEWABLE ORAL DAILY
COMMUNITY

## 2018-03-09 RX ORDER — BIOTIN 10000 MCG
1 CAPSULE ORAL DAILY
COMMUNITY

## 2018-03-09 RX ORDER — LISINOPRIL 10 MG/1
TABLET ORAL
COMMUNITY
Start: 2018-01-27 | End: 2018-04-27 | Stop reason: SDUPTHER

## 2018-03-09 RX ORDER — CHLORHEXIDINE/GLYCERIN/HE-CELL
JELLY (GRAM) TOPICAL 2 TIMES DAILY
COMMUNITY

## 2018-03-13 ENCOUNTER — OFFICE VISIT (OUTPATIENT)
Dept: SURGICAL ONCOLOGY | Facility: CLINIC | Age: 73
End: 2018-03-13
Payer: MEDICARE

## 2018-03-13 VITALS
TEMPERATURE: 97.3 F | BODY MASS INDEX: 29.25 KG/M2 | SYSTOLIC BLOOD PRESSURE: 148 MMHG | HEIGHT: 60 IN | RESPIRATION RATE: 16 BRPM | DIASTOLIC BLOOD PRESSURE: 78 MMHG | WEIGHT: 149 LBS | HEART RATE: 60 BPM

## 2018-03-13 DIAGNOSIS — D03.72 MELANOMA IN SITU OF LEFT LOWER EXTREMITY (HCC): Primary | ICD-10-CM

## 2018-03-13 PROCEDURE — 99204 OFFICE O/P NEW MOD 45 MIN: CPT | Performed by: SURGERY

## 2018-03-13 RX ORDER — HYDROCODONE BITARTRATE AND ACETAMINOPHEN 5; 325 MG/1; MG/1
1 TABLET ORAL EVERY 4 HOURS PRN
Qty: 15 TABLET | Refills: 0 | Status: SHIPPED | OUTPATIENT
Start: 2018-03-13 | End: 2018-04-17 | Stop reason: ALTCHOICE

## 2018-03-13 NOTE — PROGRESS NOTES
Surgical Oncology Follow Up       [address]24 [facility],[address]  [facility] [address] [address]  Domoniqueabebe Alison Valenzuela  1945  [id number]      Chief Complaint   Patient presents with    Melanoma     Pt here for consultation for melanoma on left upper thigh  She denies any other symptoms  No history exists  History of Present Illness:   79-year-old female who had a mole on her left leg for approximately 10-15 years  On a routine visit with her dermatologist she noticed it started to become red around the edges  Consequently a biopsy was performed, this revealed early melanoma situ  She comes in now to discuss this  She denies any abdominal pain, nausea, vomiting, back pain, bone pain, headaches, or cough  There is a family history of melanoma, but no history of dysplastic nevus syndrome  Review of Systems   Skin: Positive for wound  Complete ROS Surg Onc:   Constitutional: The patient denies new or recent history of general fatigue, no recent weight loss, no change in appetite  Eyes: No complaints of visual problems, no scleral icterus  ENT: no complaints of ear pain, no hoarseness, no difficulty swallowing,  no tinnitus and no new masses in head, oral cavity, or neck  Cardiovascular: No complaints of chest pain, no palpitations, no ankle edema  Respiratory: No complaints of shortness of breath, no cough  Gastrointestinal: No complaints of jaundice, no bloody stools, no pale stools  Genitourinary: No complaints of dysuria, no hematuria, no nocturia, no frequent urination, no urethral discharge  Musculoskeletal: No complaints of weakness, paralysis, joint stiffness or arthralgias  Integumentary: No complaints of rash, no new lesions  Neurological: No complaints of convulsions, no seizures, no dizziness  Hematologic/Lymphatic: No complaints of easy bruising     Endocrine:  No hot or cold intolerance  No polydipsia, polyphagia, or polyuria  Allergy/immunology:  No environmental allergies  No food allergies  Not immunocompromised  Skin:  No pallor or rash  Surgical wound  Patient Active Problem List   Diagnosis    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 ([address] )    Adhesive capsulitis of right shoulder    Essential hypertension    Hyperlipidemia    Hypothyroidism    Mild vitamin D deficiency    Borderline osteopenia    Situational anxiety     No past medical history on file  Past Surgical History:   Procedure Laterality Date    INDUCED       KNEE ARTHROSCOPY      NEUROPLASTY / TRANSPOSITION MEDIAN NERVE AT CARPAL TUNNEL       Family History   Problem Relation Age of Onset    Stroke Mother     Hypertension Mother     Stroke Father     Diabetes Father     Hypertension Father     Diabetes Brother      Social History     Social History    Marital status: /Civil Union     Spouse name: N/A    Number of children: N/A    Years of education: N/A     Occupational History    Not on file       Social History Main Topics    Smoking status: Former Smoker    Smokeless tobacco: Never Used    Alcohol use Yes      Comment: social    Drug use: Unknown    Sexual activity: Not on file     Other Topics Concern    Not on file     Social History Narrative    No narrative on file       Current Outpatient Prescriptions:     ALPRAZolam (XANAX) 0 25 mg tablet, Take 1 tablet (0 25 mg total) by mouth 2 (two) times a day as needed for anxiety, Disp: 180 tablet, Rfl: 0    ascorbic acid (VITAMIN C) 250 mg tablet, Take by mouth, Disp: , Rfl:     aspirin 81 MG tablet, Take by mouth, Disp: , Rfl:     Biotin 10 MG CAPS, Take by mouth, Disp: , Rfl:     Calcium Carbonate-Vitamin D (CALCIUM 600+D HIGH POTENCY) 600-400 MG-UNIT per tablet, Take by mouth, Disp: , Rfl:     coenzyme Q-10 100 MG capsule, Take by mouth, Disp: , Rfl:     ferrous sulfate 325 (65 Fe) mg tablet, Take by mouth, Disp: , Rfl:     lisinopril (ZESTRIL) 10 mg tablet, , Disp: , Rfl:     Magnesium Citrate 100 MG TABS, Take by mouth, Disp: , Rfl:     Omega-3 Fatty Acids (CVS FISH OIL) 1000 MG CAPS, Take by mouth, Disp: , Rfl:     rosuvastatin (CRESTOR) 10 MG tablet, TAKE AS DIRECTED 2 DAYS EACH WEEK, Disp: 24 tablet, Rfl: 1    vitamin E, tocopherol, (CVS VITAMIN E) 1,000 units capsule, Take by mouth, Disp: , Rfl:   Allergies   Allergen Reactions    Penicillins Rash    Sulfamethoxazole-Trimethoprim Rash     Reaction Date: [id number];      Vitals:    03/13/18 1355   BP: 148/78   Pulse: 60   Resp: 16   Temp: (!) 97 3 °F (36 3 °C)       Physical Exam   Constitutional: General appearance: The Patient is well-developed and well-nourished who appears the stated age in no acute distress  Patient is pleasant and talkative  HEENT:  Normocephalic  Sclerae are anicteric  Mucous membranes are moist  Neck is supple without adenopathy  No JVD  Chest: The lungs are clear to auscultation  Cardiac: Heart is regular rate  Abdomen: Abdomen is soft, non-tender, non-distended and without masses  Extremities: There is no clubbing or cyanosis  There is no edema  Symmetric  Neuro: Grossly nonfocal  Gait is normal      Lymphatic: No evidence of cervical adenopathy bilaterally  No evidence of axillary adenopathy bilaterally  No evidence of inguinal adenopathy bilaterally  Skin: Warm, anicteric    there is a healing biopsy scar of the left leg  Psych:  Patient is pleasant and talkative  Breasts:      Pathology:  Melanoma in situ of the left leg    Labs:      Imaging  Mammo Screening Bilateral W Cad    Result Date: 2/15/2018  Narrative: Patient History: Patient is postmenopausal and is nulliparous  No known family history of cancer  Patient has never smoked  Patient's BMI is 28 9  Reason for exam: screening, asymptomatic   Mammo Screening Bilateral W CAD: February 15, 2018 - Check In #: [de-identified] Bilateral MLO, CC, and XCCL view(s) were taken  Technologist: EBONI Pandey (R)(M) Prior study comparison: February 14, 2017, mammo screening bilateral W CAD performed at [address] [place] [address]  December 1, 2015, digital bilateral screening mammogram performed at [address] [place] [address]  November 25, 2014, digital bilateral screening mammogram performed at [address] [place] [address]  November 19, 2013, digital bilateral screening mammogram performed at [address] [place] [address]  November 21, 2012, right breast unilateral diagnostic mammogram, performed at [address]  [employer] [facility]  November 12, 2012, digital bilateral screening mammogram performed at [address] [place] [address]  There are scattered fibroglandular densities  No dominant soft tissue mass, architectural distortion or suspicious calcifications are noted  The skin and nipple contours are within normal limits  IMPRESSION:    No evidence of malignancy  No significant changes when compared with prior studies  ACR BI-RADS® Assessments: BiRad:1 - Negative Recommendation: Routine screening mammogram of both breasts in 1 year  Analyzed by CAD The patient is scheduled in a reminder system for screening mammography  8-10% of cancers will be missed on mammography  Management of a palpable abnormality must be based on clinical grounds  Patients will be notified of their results via letter from our facility  Accredited by [employer] of Radiology and FDA  Transcription Location: EBONI [address]: [id number] Risk Value(s): Tyrer-Cuzick 10 Year: 2 800%, Tyrer-Cuzick Lifetime: 4 400%, Myriad Table: 1 5%, BERTRAND 5 Year: 1 9%, NCI Lifetime: 5 6%    I reviewed the above laboratory and imaging data  Discussion/Summary:  19-year-old male with recently diagnosed melanoma in situ of the left leg  We will have her slides reviewed by our dermato pathologist   Assuming there is no change, I would proceed with wide excision with 5 mm margins    The risks of wide excision were explained and included bleeding, infection, recurrence, need for further surgery, and wound complications  Informed consent was obtained  We will schedule this at our earliest mutual convenience here in the office  She is agreeable to this  All her questions were answered

## 2018-03-13 NOTE — LETTER
March 13, 2018     [address]  [address]  [address] [place] [address]    Patient: Matilda Sandra   YOB: 1945   Date of Visit: 3/13/2018       Dear Dr Verona Mora:    Thank you for referring Derek Mckinney to me for evaluation  Below are my notes for this consultation  If you have questions, please do not hesitate to call me  I look forward to following your patient along with you  Sincerely,        Sarwat Vazquez MD        CC: DO Sarwat Herrera MD  3/13/2018  2:24 PM  Sign at close encounter               Surgical Oncology Follow Up       [address] [place], [place] [address]  1945  [id number]      Chief Complaint   Patient presents with    Melanoma     Pt here for consultation for melanoma on left upper thigh  She denies any other symptoms  No history exists  History of Present Illness:   80-year-old female who had a mole on her left leg for approximately 10-15 years  On a routine visit with her dermatologist she noticed it started to become red around the edges  Consequently a biopsy was performed, this revealed early melanoma situ  She comes in now to discuss this  She denies any abdominal pain, nausea, vomiting, back pain, bone pain, headaches, or cough  There is a family history of melanoma, but no history of dysplastic nevus syndrome  Review of Systems   Skin: Positive for wound  Complete ROS Surg Onc:   Constitutional: The patient denies new or recent history of general fatigue, no recent weight loss, no change in appetite  Eyes: No complaints of visual problems, no scleral icterus  ENT: no complaints of ear pain, no hoarseness, no difficulty swallowing,  no tinnitus and no new masses in head, oral cavity, or neck  Cardiovascular: No complaints of chest pain, no palpitations, no ankle edema     Respiratory: No complaints of shortness of breath, no cough  Gastrointestinal: No complaints of jaundice, no bloody stools, no pale stools  Genitourinary: No complaints of dysuria, no hematuria, no nocturia, no frequent urination, no urethral discharge  Musculoskeletal: No complaints of weakness, paralysis, joint stiffness or arthralgias  Integumentary: No complaints of rash, no new lesions  Neurological: No complaints of convulsions, no seizures, no dizziness  Hematologic/Lymphatic: No complaints of easy bruising  Endocrine:  No hot or cold intolerance  No polydipsia, polyphagia, or polyuria  Allergy/immunology:  No environmental allergies  No food allergies  Not immunocompromised  Skin:  No pallor or rash  Surgical wound  Patient Active Problem List   Diagnosis    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 ([facility] [address] )    Adhesive capsulitis of right shoulder    Essential hypertension    Hyperlipidemia    Hypothyroidism    Mild vitamin D deficiency    Borderline osteopenia    Situational anxiety     No past medical history on file  Past Surgical History:   Procedure Laterality Date    INDUCED       KNEE ARTHROSCOPY      NEUROPLASTY / TRANSPOSITION MEDIAN NERVE AT CARPAL TUNNEL       Family History   Problem Relation Age of Onset    Stroke Mother     Hypertension Mother     Stroke Father     Diabetes Father     Hypertension Father     Diabetes Brother      Social History     Social History    Marital status: /Civil Union     Spouse name: N/A    Number of children: N/A    Years of education: N/A     Occupational History    Not on file       Social History Main Topics    Smoking status: Former Smoker    Smokeless tobacco: Never Used    Alcohol use Yes      Comment: social    Drug use: Unknown    Sexual activity: Not on file     Other Topics Concern    Not on file     Social History Narrative    No narrative on file       Current Outpatient Prescriptions:    ALPRAZolam (XANAX) 0 25 mg tablet, Take 1 tablet (0 25 mg total) by mouth 2 (two) times a day as needed for anxiety, Disp: 180 tablet, Rfl: 0    ascorbic acid (VITAMIN C) 250 mg tablet, Take by mouth, Disp: , Rfl:     aspirin 81 MG tablet, Take by mouth, Disp: , Rfl:     Biotin 10 MG CAPS, Take by mouth, Disp: , Rfl:     Calcium Carbonate-Vitamin D (CALCIUM 600+D HIGH POTENCY) 600-400 MG-UNIT per tablet, Take by mouth, Disp: , Rfl:     coenzyme Q-10 100 MG capsule, Take by mouth, Disp: , Rfl:     ferrous sulfate 325 (65 Fe) mg tablet, Take by mouth, Disp: , Rfl:     lisinopril (ZESTRIL) 10 mg tablet, , Disp: , Rfl:     Magnesium Citrate 100 MG TABS, Take by mouth, Disp: , Rfl:     Omega-3 Fatty Acids (CVS FISH OIL) 1000 MG CAPS, Take by mouth, Disp: , Rfl:     rosuvastatin (CRESTOR) 10 MG tablet, TAKE AS DIRECTED 2 DAYS EACH WEEK, Disp: 24 tablet, Rfl: 1    vitamin E, tocopherol, (CVS VITAMIN E) 1,000 units capsule, Take by mouth, Disp: , Rfl:   Allergies   Allergen Reactions    Penicillins Rash    Sulfamethoxazole-Trimethoprim Rash     Reaction Date: [id number];      Vitals:    03/13/18 1355   BP: 148/78   Pulse: 60   Resp: 16   Temp: (!) 97 3 °F (36 3 °C)       Physical Exam   Constitutional: General appearance: The Patient is well-developed and well-nourished who appears the stated age in no acute distress  Patient is pleasant and talkative  HEENT:  Normocephalic  Sclerae are anicteric  Mucous membranes are moist  Neck is supple without adenopathy  No JVD  Chest: The lungs are clear to auscultation  Cardiac: Heart is regular rate  Abdomen: Abdomen is soft, non-tender, non-distended and without masses  Extremities: There is no clubbing or cyanosis  There is no edema  Symmetric  Neuro: Grossly nonfocal  Gait is normal      Lymphatic: No evidence of cervical adenopathy bilaterally  No evidence of axillary adenopathy bilaterally  No evidence of inguinal adenopathy bilaterally  Skin: Warm, anicteric     there is a healing biopsy scar of the left leg  Psych:  Patient is pleasant and talkative  Breasts:      Pathology:  Melanoma in situ of the left leg    Labs:      Imaging  Mammo Screening Bilateral W Cad    Result Date: 2/15/2018  Narrative: Patient History: Patient is postmenopausal and is nulliparous  No known family history of cancer  Patient has never smoked  Patient's BMI is 28 9  Reason for exam: screening, asymptomatic  Mammo Screening Bilateral W CAD: February 15, 2018 - Check In #: [de-identified] Bilateral MLO, CC, and XCCL view(s) were taken  Technologist: EBONI Sotomayor (R)(M) Prior study comparison: February 14, 2017, mammo screening bilateral W CAD performed at [address] [place] [address]  December 1, 2015, digital bilateral screening mammogram performed at [address] [place] [address]  November 25, 2014, digital bilateral screening mammogram performed at [address] [place] [address]  November 19, 2013, digital bilateral screening mammogram performed at [address] [place] [address]  November 21, 2012, right breast unilateral diagnostic mammogram, performed at [address] [place] [address]  November 12, 2012, digital bilateral screening mammogram performed at [address] [place] [address]  There are scattered fibroglandular densities  No dominant soft tissue mass, architectural distortion or suspicious calcifications are noted  The skin and nipple contours are within normal limits  IMPRESSION:    No evidence of malignancy  No significant changes when compared with prior studies  ACR BI-RADS® Assessments: BiRad:1 - Negative Recommendation: Routine screening mammogram of both breasts in 1 year  Analyzed by CAD The patient is scheduled in a reminder system for screening mammography  8-10% of cancers will be missed on mammography  Management of a palpable abnormality must be based on clinical grounds    Patients will be notified of their results via letter from our facility  Accredited by [facility] of Radiology and FDA  Transcription Location: [facility]: [id number] Risk Value(s): Tyrer-Cuzick 10 Year: 2 800%, Tyrer-Cuzick Lifetime: 4 400%, Myriad Table: 1 5%, BERTRAND 5 Year: 1 9%, NCI Lifetime: 5 6%    I reviewed the above laboratory and imaging data  Discussion/Summary:  72-year-old male with recently diagnosed melanoma in situ of the left leg  We will have her slides reviewed by our dermato pathologist   Assuming there is no change, I would proceed with wide excision with 5 mm margins  The risks of wide excision were explained and included bleeding, infection, recurrence, need for further surgery, and wound complications  Informed consent was obtained  We will schedule this at our earliest mutual convenience here in the office  She is agreeable to this  All her questions were answered

## 2018-04-17 ENCOUNTER — PROCEDURE VISIT (OUTPATIENT)
Dept: SURGICAL ONCOLOGY | Facility: CLINIC | Age: 73
End: 2018-04-17
Payer: MEDICARE

## 2018-04-17 VITALS
WEIGHT: 148 LBS | SYSTOLIC BLOOD PRESSURE: 128 MMHG | HEART RATE: 58 BPM | TEMPERATURE: 98.4 F | BODY MASS INDEX: 29.06 KG/M2 | RESPIRATION RATE: 18 BRPM | DIASTOLIC BLOOD PRESSURE: 70 MMHG | HEIGHT: 60 IN

## 2018-04-17 DIAGNOSIS — D03.72 MELANOMA IN SITU OF LEFT LOWER EXTREMITY (HCC): Primary | ICD-10-CM

## 2018-04-17 PROCEDURE — 88305 TISSUE EXAM BY PATHOLOGIST: CPT | Performed by: PATHOLOGY

## 2018-04-17 PROCEDURE — 12032 INTMD RPR S/A/T/EXT 2.6-7.5: CPT | Performed by: SURGERY

## 2018-04-17 PROCEDURE — 11602 EXC TR-EXT MAL+MARG 1.1-2 CM: CPT | Performed by: SURGERY

## 2018-04-17 PROCEDURE — 88342 IMHCHEM/IMCYTCHM 1ST ANTB: CPT | Performed by: PATHOLOGY

## 2018-04-17 PROCEDURE — 88341 IMHCHEM/IMCYTCHM EA ADD ANTB: CPT | Performed by: PATHOLOGY

## 2018-04-17 NOTE — PROGRESS NOTES
Surgical Oncology Follow Up       [address] [facility],[address] [place] [address]  [facility] [address] [address]    Miley Powers  [birth year]  [id number]  [address] [place] [address],[address] [place] [address]  [facility] [address] [address]    Chief Complaint   Patient presents with    Procedure     Pt is here for wide excision of 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  No history exists  History of Present Illness:   Patient returns for excision of her melanoma in situ  Our pathology is reviewed this and felt this was atypical intraepidermal melanocytic proliferation  Review of Systems  Complete ROS Surg Onc:   Complete ROS Surg Onc:   Constitutional: The patient denies new or recent history of general fatigue, no recent weight loss, no change in appetite  Eyes: No complaints of visual problems, no scleral icterus  ENT: no complaints of ear pain, no hoarseness, no difficulty swallowing,  no tinnitus and no new masses in head, oral cavity, or neck  Cardiovascular: No complaints of chest pain, no palpitations, no ankle edema  Respiratory: No complaints of shortness of breath, no cough  Gastrointestinal: No complaints of jaundice, no bloody stools, no pale stools  Genitourinary: No complaints of dysuria, no hematuria, no nocturia, no frequent urination, no urethral discharge  Musculoskeletal: No complaints of weakness, paralysis, joint stiffness or arthralgias  Integumentary: No complaints of rash, no new lesions  Neurological: No complaints of convulsions, no seizures, no dizziness  Hematologic/Lymphatic: No complaints of easy bruising  Endocrine:  No hot or cold intolerance  No polydipsia, polyphagia, or polyuria  Allergy/immunology:  No environmental allergies  No food allergies  Not immunocompromised  Skin:  No pallor or rash  No wound          Patient Active Problem List   Diagnosis    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 (HCC)    Adhesive capsulitis of right shoulder    Essential hypertension    Hyperlipidemia    Hypothyroidism    Mild vitamin D deficiency    Borderline osteopenia    Situational anxiety     No past medical history on file  Past Surgical History:   Procedure Laterality Date    INDUCED       KNEE ARTHROSCOPY      NEUROPLASTY / TRANSPOSITION MEDIAN NERVE AT CARPAL TUNNEL       Family History   Problem Relation Age of Onset   [facility] Stroke Mother     Hypertension Mother     Stroke Father     Diabetes Father     Hypertension Father     Diabetes Brother     Melanoma Sister     Squamous cell carcinoma Sister      Social History     Social History    Marital status: /Civil Union     Spouse name: N/A    Number of children: N/A    Years of education: N/A     Occupational History    Not on file       Social History Main Topics    Smoking status: Former Smoker    Smokeless tobacco: Never Used    Alcohol use Yes      Comment: social    Drug use: Unknown    Sexual activity: Not on file     Other Topics Concern    Not on file     Social History Narrative    No narrative on file       Current Outpatient Prescriptions:     ALPRAZolam (XANAX) 0 25 mg tablet, Take 1 tablet (0 25 mg total) by mouth 2 (two) times a day as needed for anxiety, Disp: 180 tablet, Rfl: 0    ascorbic acid (VITAMIN C) 250 mg tablet, Take by mouth, Disp: , Rfl:     aspirin 81 MG tablet, Take by mouth, Disp: , Rfl:     Biotin 10 MG CAPS, Take by mouth, Disp: , Rfl:     Calcium Carbonate-Vitamin D (CALCIUM 600+D HIGH POTENCY) 600-400 MG-UNIT per tablet, Take by mouth, Disp: , Rfl:     coenzyme Q-10 100 MG capsule, Take by mouth, Disp: , Rfl:     ferrous sulfate 325 (65 Fe) mg tablet, Take by mouth, Disp: , Rfl:     lisinopril (ZESTRIL) 10 mg tablet, , Disp: , Rfl:     Magnesium Citrate 100 MG TABS, Take by mouth, Disp: , Rfl:     Omega-3 Fatty Acids (CVS FISH OIL) 1000 MG CAPS, Take by mouth, Disp: , Rfl:     rosuvastatin (CRESTOR) 10 MG tablet, TAKE AS DIRECTED 2 DAYS EACH WEEK, Disp: 24 tablet, Rfl: 1    vitamin E, tocopherol, (CVS VITAMIN E) 1,000 units capsule, Take by mouth, Disp: , Rfl:   Allergies   Allergen Reactions    Penicillins Rash    Sulfamethoxazole-Trimethoprim Rash     Reaction Date: [id number];      Vitals:    04/17/18 1600   BP: 128/70   Pulse: 58   Resp: 18   Temp: 98 4 °F (36 9 °C)       Physical Exam  Constitutional: General appearance: The Patient is well-developed and well-nourished who appears the stated age in no acute distress  Patient is pleasant and talkative  Pathology:  Final Diagnosis   A  Skin, Left Upper Leg, shave biopsy ( 1 slide [id number] [facility],collected on 02/14/2018): - Atypical intraepidermal melanocytic proliferation, recommend a re-excision; see note      Note:  Sections show a poorly circumscribed melanocytic proliferation comprised mostly of single cells  On section planes studied, lesional cells are close to bilateral tissue edges  A reexcision to ensure complete removal is recommended         Interpretation performed at [facility], [address] [place] [address], [address]         Labs:      Imaging  No results found  I reviewed the above laboratory and imaging data  Procedure: The lesion measured 0 7 x 0 5 cm  The wide excision measured 1 7 x 4 5 cm  Under sterile conditions and local anesthesia an elliptical incision measuring 1 7 x 4 5 cm was made  Using sharp dissection this was taken down through the skin, and subcutaneous tissue down to the underlying fat and was excised in total   Specimen was oriented  Wound was copiously irrigated  There was excellent hemostasis  Incision was approximated with interrupted 2 0 Vicryl suture subcutaneously and a running 4 Monocryl suture in a subcuticular fashion  Steri-Strips and sterile dressings were applied    She tolerated this well     Discussion/Summary:   51-year-old female with an atypical intraepidermal melanocytic proliferation  Previous pathology suggested this was melanoma in situ  Consequently I treated this as melanoma in situ  She tolerated wide excision well  She was instructed on signs and symptoms of infection  I will see her again in 2 weeks for a wound check and to discuss the pathology  She is agreeable to this  All her questions were answered

## 2018-04-17 NOTE — LETTER
April 17, 2018     Stephanie Valentineanurag, DO  [facility]    Patient: Bill Millan   YOB: 1945   Date of Visit: 4/17/2018       Dear Dr Guerrero Alt:    Thank you for referring Tracie Pavon to me for evaluation  Below are my notes for this consultation  If you have questions, please do not hesitate to call me  I look forward to following your patient along with you  Sincerely,        Zayra Garza MD        CC: Lola Pascual MD  4/17/2018  4:41 PM  Sign at close encounter               Surgical Oncology Follow Up       [address] [place] [address] [address]  [birth year]  [id number]  [address],[address]  [facility] [address] [address]    Chief Complaint   Patient presents with    Procedure     Pt is here for wide excision of 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  No history exists  History of Present Illness:   Patient returns for excision of her melanoma in situ  Our pathology is reviewed this and felt this was atypical intraepidermal melanocytic proliferation  Review of Systems  Complete ROS Surg Onc:   Complete ROS Surg Onc:   Constitutional: The patient denies new or recent history of general fatigue, no recent weight loss, no change in appetite  Eyes: No complaints of visual problems, no scleral icterus  ENT: no complaints of ear pain, no hoarseness, no difficulty swallowing,  no tinnitus and no new masses in head, oral cavity, or neck  Cardiovascular: No complaints of chest pain, no palpitations, no ankle edema  Respiratory: No complaints of shortness of breath, no cough  Gastrointestinal: No complaints of jaundice, no bloody stools, no pale stools  Genitourinary: No complaints of dysuria, no hematuria, no nocturia, no frequent urination, no urethral discharge  Musculoskeletal: No complaints of weakness, paralysis, joint stiffness or arthralgias  Integumentary: No complaints of rash, no new lesions  Neurological: No complaints of convulsions, no seizures, no dizziness  Hematologic/Lymphatic: No complaints of easy bruising  Endocrine:  No hot or cold intolerance  No polydipsia, polyphagia, or polyuria  Allergy/immunology:  No environmental allergies  No food allergies  Not immunocompromised  Skin:  No pallor or rash  No wound  Patient Active Problem List   Diagnosis    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 ([address] )    Adhesive capsulitis of right shoulder    Essential hypertension    Hyperlipidemia    Hypothyroidism    Mild vitamin D deficiency    Borderline osteopenia    Situational anxiety     No past medical history on file  Past Surgical History:   Procedure Laterality Date    INDUCED       KNEE ARTHROSCOPY      NEUROPLASTY / TRANSPOSITION MEDIAN NERVE AT CARPAL TUNNEL       Family History   Problem Relation Age of Onset   [address] [facility] Stroke Mother     Hypertension Mother     Stroke Father     Diabetes Father     Hypertension Father     Diabetes Brother     Melanoma Sister     Squamous cell carcinoma Sister      Social History     Social History    Marital status: /Civil Union     Spouse name: N/A    Number of children: N/A    Years of education: N/A     Occupational History    Not on file       Social History Main Topics    Smoking status: Former Smoker    Smokeless tobacco: Never Used    Alcohol use Yes      Comment: social    Drug use: Unknown    Sexual activity: Not on file     Other Topics Concern    Not on file     Social History Narrative    No narrative on file       Current Outpatient Prescriptions:     ALPRAZolam (XANAX) 0 25 mg tablet, Take 1 tablet (0 25 mg total) by mouth 2 (two) times a day as needed for anxiety, Disp: 180 tablet, Rfl: 0    ascorbic acid (VITAMIN C) 250 mg tablet, Take by mouth, Disp: , Rfl:     aspirin 81 MG tablet, Take by mouth, Disp: , Rfl:     Biotin 10 MG CAPS, Take by mouth, Disp: , Rfl:     Calcium Carbonate-Vitamin D (CALCIUM 600+D HIGH POTENCY) 600-400 MG-UNIT per tablet, Take by mouth, Disp: , Rfl:     coenzyme Q-10 100 MG capsule, Take by mouth, Disp: , Rfl:     ferrous sulfate 325 (65 Fe) mg tablet, Take by mouth, Disp: , Rfl:     lisinopril (ZESTRIL) 10 mg tablet, , Disp: , Rfl:     Magnesium Citrate 100 MG TABS, Take by mouth, Disp: , Rfl:     Omega-3 Fatty Acids (CVS FISH OIL) 1000 MG CAPS, Take by mouth, Disp: , Rfl:     rosuvastatin (CRESTOR) 10 MG tablet, TAKE AS DIRECTED 2 DAYS EACH WEEK, Disp: 24 tablet, Rfl: 1    vitamin E, tocopherol, (CVS VITAMIN E) 1,000 units capsule, Take by mouth, Disp: , Rfl:   Allergies   Allergen Reactions    Penicillins Rash    Sulfamethoxazole-Trimethoprim Rash     Reaction Date: [id number];      Vitals:    04/17/18 1600   BP: 128/70   Pulse: 58   Resp: 18   Temp: 98 4 °F (36 9 °C)       Physical Exam  Constitutional: General appearance: The Patient is well-developed and well-nourished who appears the stated age in no acute distress  Patient is pleasant and talkative  Pathology:  Final Diagnosis   A  Skin, Left Upper Leg, shave biopsy ( 1 slide [id number] [facility],collected on 02/14/2018): - Atypical intraepidermal melanocytic proliferation, recommend a re-excision; see note      Note:  Sections show a poorly circumscribed melanocytic proliferation comprised mostly of single cells  On section planes studied, lesional cells are close to bilateral tissue edges  A reexcision to ensure complete removal is recommended         Interpretation performed at [facility], [address] [place] [address], [address]         Labs:      Imaging  No results found  I reviewed the above laboratory and imaging data  Procedure: The lesion measured 0 7 x 0 5 cm    The wide excision measured 1 7 x 4 5 cm   Under sterile conditions and local anesthesia an elliptical incision measuring 1 7 x 4 5 cm was made  Using sharp dissection this was taken down through the skin, and subcutaneous tissue down to the underlying fat and was excised in total   Specimen was oriented  Wound was copiously irrigated  There was excellent hemostasis  Incision was approximated with interrupted 2 0 Vicryl suture subcutaneously and a running 4 Monocryl suture in a subcuticular fashion  Steri-Strips and sterile dressings were applied  She tolerated this well  Discussion/Summary:   72-year-old female with an atypical intraepidermal melanocytic proliferation  Previous pathology suggested this was melanoma in situ  Consequently I treated this as melanoma in situ  She tolerated wide excision well  She was instructed on signs and symptoms of infection  I will see her again in 2 weeks for a wound check and to discuss the pathology  She is agreeable to this  All her questions were answered

## 2018-04-27 DIAGNOSIS — I10 ESSENTIAL HYPERTENSION: Primary | ICD-10-CM

## 2018-04-27 RX ORDER — LISINOPRIL 10 MG/1
TABLET ORAL
Qty: 90 TABLET | Refills: 3 | Status: SHIPPED | OUTPATIENT
Start: 2018-04-27 | End: 2019-01-23 | Stop reason: SDUPTHER

## 2018-04-30 DIAGNOSIS — E03.9 HYPOTHYROIDISM: ICD-10-CM

## 2018-05-01 ENCOUNTER — OFFICE VISIT (OUTPATIENT)
Dept: SURGICAL ONCOLOGY | Facility: CLINIC | Age: 73
End: 2018-05-01
Payer: MEDICARE

## 2018-05-01 VITALS
RESPIRATION RATE: 16 BRPM | DIASTOLIC BLOOD PRESSURE: 74 MMHG | HEART RATE: 100 BPM | BODY MASS INDEX: 29.64 KG/M2 | HEIGHT: 60 IN | WEIGHT: 151 LBS | SYSTOLIC BLOOD PRESSURE: 138 MMHG | TEMPERATURE: 97.8 F

## 2018-05-01 DIAGNOSIS — D03.72 MELANOMA IN SITU OF LEFT LOWER EXTREMITY (HCC): Primary | ICD-10-CM

## 2018-05-01 PROCEDURE — 99212 OFFICE O/P EST SF 10 MIN: CPT | Performed by: SURGERY

## 2018-05-01 NOTE — PROGRESS NOTES
Surgical Oncology Follow Up       [address] [facility],6Th Floor  [facility] [address] [address]    Lidya Rebeka  1/99/1859  [id number]      Chief Complaint   Patient presents with    Follow-up     Pt here for 2 wk f/u after wide excision  Left thigh incision is healing well; no complaints  No history exists  Staging:  Melanoma in situ April 2018  Treatment history: Wide excision left thigh melanoma in situ April 2018  Current treatment:  Observation  Disease status:  LENCHO    History of Present Illness:   Patient returns in follow-up of her wide excision  She is doing well at this time with no complaints  No residual lesion was seen  No fevers or chills  Review of Systems   Skin: Positive for wound  Complete ROS Surg Onc:   Complete ROS Surg Onc:   Constitutional: The patient denies new or recent history of general fatigue, no recent weight loss, no change in appetite  Eyes: No complaints of visual problems, no scleral icterus  ENT: no complaints of ear pain, no hoarseness, no difficulty swallowing,  no tinnitus and no new masses in head, oral cavity, or neck  Cardiovascular: No complaints of chest pain, no palpitations, no ankle edema  Respiratory: No complaints of shortness of breath, no cough  Gastrointestinal: No complaints of jaundice, no bloody stools, no pale stools  Genitourinary: No complaints of dysuria, no hematuria, no nocturia, no frequent urination, no urethral discharge  Musculoskeletal: No complaints of weakness, paralysis, joint stiffness or arthralgias  Integumentary: No complaints of rash, no new lesions  Neurological: No complaints of convulsions, no seizures, no dizziness  Hematologic/Lymphatic: No complaints of easy bruising  Endocrine:  No hot or cold intolerance  No polydipsia, polyphagia, or polyuria  Allergy/immunology:  No environmental allergies  No food allergies    Not immunocompromised  Skin:  No pallor or rash  Surgical wound  Patient Active Problem List   Diagnosis    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 ([address] )    Adhesive capsulitis of right shoulder    Essential hypertension    Hyperlipidemia    Hypothyroidism    Mild vitamin D deficiency    Borderline osteopenia    Situational anxiety     No past medical history on file  Past Surgical History:   Procedure Laterality Date    INDUCED       KNEE ARTHROSCOPY      NEUROPLASTY / TRANSPOSITION MEDIAN NERVE AT CARPAL TUNNEL       Family History   Problem Relation Age of Onset   Tab Stroke Mother     Hypertension Mother     Stroke Father     Diabetes Father     Hypertension Father     Diabetes Brother     Melanoma Sister     Squamous cell carcinoma Sister      Social History     Social History    Marital status: /Civil Union     Spouse name: N/A    Number of children: N/A    Years of education: N/A     Occupational History    Not on file       Social History Main Topics    Smoking status: Former Smoker    Smokeless tobacco: Never Used    Alcohol use Yes      Comment: social    Drug use: Unknown    Sexual activity: Not on file     Other Topics Concern    Not on file     Social History Narrative    No narrative on file       Current Outpatient Prescriptions:     ALPRAZolam (XANAX) 0 25 mg tablet, Take 1 tablet (0 25 mg total) by mouth 2 (two) times a day as needed for anxiety, Disp: 180 tablet, Rfl: 0    ascorbic acid (VITAMIN C) 250 mg tablet, Take by mouth, Disp: , Rfl:     aspirin 81 MG tablet, Take by mouth, Disp: , Rfl:     Biotin 10 MG CAPS, Take by mouth, Disp: , Rfl:     Calcium Carbonate-Vitamin D (CALCIUM 600+D HIGH POTENCY) 600-400 MG-UNIT per tablet, Take by mouth, Disp: , Rfl:     coenzyme Q-10 100 MG capsule, Take by mouth, Disp: , Rfl:     ferrous sulfate 325 (65 Fe) mg tablet, Take by mouth, Disp: , Rfl:     lisinopril (ZESTRIL) 10 mg tablet, TAKE 1 TABLET BY MOUTH EVERY DAY, Disp: 90 tablet, Rfl: 3    Magnesium Citrate 100 MG TABS, Take by mouth, Disp: , Rfl:     Omega-3 Fatty Acids (CVS FISH OIL) 1000 MG CAPS, Take by mouth, Disp: , Rfl:     rosuvastatin (CRESTOR) 10 MG tablet, TAKE AS DIRECTED 2 DAYS EACH WEEK, Disp: 24 tablet, Rfl: 1    vitamin E, tocopherol, (CVS VITAMIN E) 1,000 units capsule, Take by mouth, Disp: , Rfl:   Allergies   Allergen Reactions    Penicillins Rash    Sulfamethoxazole-Trimethoprim Rash     Reaction Date: [id number];      Vitals:    05/01/18 1327   BP: 138/74   Pulse: 100   Resp: 16   Temp: 97 8 °F (36 6 °C)       Physical Exam  Constitutional: General appearance: The Patient is well-developed and well-nourished who appears the stated age in no acute distress  Patient is pleasant and talkative  HEENT:  Normocephalic  Sclerae are anicteric  Mucous membranes are moist  Neck is supple   Extremities: There is no clubbing or cyanosis  There is no edema  Symmetric  Neuro: Grossly nonfocal  Gait is normal        Skin: Warm, anicteric    wide excision site has healed well  No erythema, drainage, or tenderness  Psych:  Patient is pleasant and talkative  Breasts:        Pathology:  Case Report   Surgical Pathology Report                         Case: [id number]                                    Authorizing Provider: Spencer Sheffield MD           Collected:           04/17/2018 1652               Ordering Location:     [facility]     Received:            04/17/2018 1654                                      Surgical [facility]                                                      Pathologist:           Lorraine Sharp MD                                                                   Specimen:    Leg, Left, Left anterior thigh                                                             Final Diagnosis   A  Skin, Left anterior thigh, resection:  - Prior biopsy site reaction, no residual atypical melanocytic lesion seen    - Inked margins with no tumor seen  - Dermal and subcutaneous lymphohistiocytic inflammation; see note      Note:  Immunostains on block A5 show that most of the lymphocytes are CD3+ T-cells, with some CD20+ B-cells            Interpretation performed at [facility], [address] [place] [address], [facility] [address]            Electronically signed by Guillermo Moreno MD on 4/27/2018 at  4:00 PM         Labs:      Imaging  No results found  I reviewed the above laboratory and imaging data  Discussion/Summary:   65-year-old female status post wide excision of a possible left thigh melanoma in situ  Review of her initial pathology just revealed atypical intraepidermal melanocytic proliferation  This was not called melanoma situ  She was treated as melanoma in situ 0 5 cm margins  Her final pathology reveals no residual tumor  I have recommended observation  She will continue to follow up with her dermatologist   I will see her again in the future should the need arise  She is agreeable to this  All her questions were answered

## 2018-05-01 NOTE — LETTER
May 1, 2018     Mark Walker, [address] [facility] [address]    Patient: Gavino Joy   YOB: 1945   Date of Visit: 5/1/2018       Dear Dr Radha Mcgarry:    Thank you for referring Cherrie Cisneros to me for evaluation  Below are my notes for this consultation  If you have questions, please do not hesitate to call me  I look forward to following your patient along with you  Sincerely,        Sherlyn Lujan MD        CC: Imelda Zuleta MD  5/1/2018  1:41 PM  Sign at close encounter               Surgical Oncology Follow Up       [address] [place] [address] [address]  1945  [id number]      Chief Complaint   Patient presents with    Follow-up     Pt here for 2 wk f/u after wide excision  Left thigh incision is healing well; no complaints  No history exists  Staging:  Melanoma in situ April 2018  Treatment history: Wide excision left thigh melanoma in situ April 2018  Current treatment:  Observation  Disease status:  LENCHO    History of Present Illness:   Patient returns in follow-up of her wide excision  She is doing well at this time with no complaints  No residual lesion was seen  No fevers or chills  Review of Systems   Skin: Positive for wound  Complete ROS Surg Onc:   Complete ROS Surg Onc:   Constitutional: The patient denies new or recent history of general fatigue, no recent weight loss, no change in appetite  Eyes: No complaints of visual problems, no scleral icterus  ENT: no complaints of ear pain, no hoarseness, no difficulty swallowing,  no tinnitus and no new masses in head, oral cavity, or neck  Cardiovascular: No complaints of chest pain, no palpitations, no ankle edema  Respiratory: No complaints of shortness of breath, no cough  Gastrointestinal: No complaints of jaundice, no bloody stools, no pale stools     Genitourinary: No complaints of dysuria, no hematuria, no nocturia, no frequent urination, no urethral discharge  Musculoskeletal: No complaints of weakness, paralysis, joint stiffness or arthralgias  Integumentary: No complaints of rash, no new lesions  Neurological: No complaints of convulsions, no seizures, no dizziness  Hematologic/Lymphatic: No complaints of easy bruising  Endocrine:  No hot or cold intolerance  No polydipsia, polyphagia, or polyuria  Allergy/immunology:  No environmental allergies  No food allergies  Not immunocompromised  Skin:  No pallor or rash  Surgical wound  Patient Active Problem List   Diagnosis    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 ([address] )    Adhesive capsulitis of right shoulder    Essential hypertension    Hyperlipidemia    Hypothyroidism    Mild vitamin D deficiency    Borderline osteopenia    Situational anxiety     No past medical history on file  Past Surgical History:   Procedure Laterality Date    INDUCED       KNEE ARTHROSCOPY      NEUROPLASTY / TRANSPOSITION MEDIAN NERVE AT CARPAL TUNNEL       Family History   Problem Relation Age of Onset   Drew Shahid Stroke Mother     Hypertension Mother     Stroke Father     Diabetes Father     Hypertension Father     Diabetes Brother     Melanoma Sister     Squamous cell carcinoma Sister      Social History     Social History    Marital status: /Civil Union     Spouse name: N/A    Number of children: N/A    Years of education: N/A     Occupational History    Not on file       Social History Main Topics    Smoking status: Former Smoker    Smokeless tobacco: Never Used    Alcohol use Yes      Comment: social    Drug use: Unknown    Sexual activity: Not on file     Other Topics Concern    Not on file     Social History Narrative    No narrative on file       Current Outpatient Prescriptions:     ALPRAZolam (XANAX) 0 25 mg tablet, Take 1 tablet (0 25 mg total) by mouth 2 (two) times a day as needed for anxiety, Disp: 180 tablet, Rfl: 0    ascorbic acid (VITAMIN C) 250 mg tablet, Take by mouth, Disp: , Rfl:     aspirin 81 MG tablet, Take by mouth, Disp: , Rfl:     Biotin 10 MG CAPS, Take by mouth, Disp: , Rfl:     Calcium Carbonate-Vitamin D (CALCIUM 600+D HIGH POTENCY) 600-400 MG-UNIT per tablet, Take by mouth, Disp: , Rfl:     coenzyme Q-10 100 MG capsule, Take by mouth, Disp: , Rfl:     ferrous sulfate 325 (65 Fe) mg tablet, Take by mouth, Disp: , Rfl:     lisinopril (ZESTRIL) 10 mg tablet, TAKE 1 TABLET BY MOUTH EVERY DAY, Disp: 90 tablet, Rfl: 3    Magnesium Citrate 100 MG TABS, Take by mouth, Disp: , Rfl:     Omega-3 Fatty Acids (CVS FISH OIL) 1000 MG CAPS, Take by mouth, Disp: , Rfl:     rosuvastatin (CRESTOR) 10 MG tablet, TAKE AS DIRECTED 2 DAYS EACH WEEK, Disp: 24 tablet, Rfl: 1    vitamin E, tocopherol, (CVS VITAMIN E) 1,000 units capsule, Take by mouth, Disp: , Rfl:   Allergies   Allergen Reactions    Penicillins Rash    Sulfamethoxazole-Trimethoprim Rash     Reaction Date: [id number];      Vitals:    05/01/18 1327   BP: 138/74   Pulse: 100   Resp: 16   Temp: 97 8 °F (36 6 °C)       Physical Exam  Constitutional: General appearance: The Patient is well-developed and well-nourished who appears the stated age in no acute distress  Patient is pleasant and talkative  HEENT:  Normocephalic  Sclerae are anicteric  Mucous membranes are moist  Neck is supple   Extremities: There is no clubbing or cyanosis  There is no edema  Symmetric  Neuro: Grossly nonfocal  Gait is normal        Skin: Warm, anicteric     wide excision site has healed well  No erythema, drainage, or tenderness  Psych:  Patient is pleasant and talkative    Breasts:        Pathology:  Case Report   Surgical Pathology Report                         Case: [id number]                                    Authorizing Provider: Carlos Gaspar MD           Collected:           04/17/2018 1425               Ordering Location:     [facility]     Received:            04/17/2018 9422                                      Surgical Oncology [facility]                                                      Pathologist:           Lorraine Sharp MD                                                                   Specimen:    Leg, Left, Left anterior thigh                                                             Final Diagnosis   A  Skin, Left anterior thigh, resection:  - Prior biopsy site reaction, no residual atypical melanocytic lesion seen  - Inked margins with no tumor seen  - Dermal and subcutaneous lymphohistiocytic inflammation; see note      Note:  Immunostains on block A5 show that most of the lymphocytes are CD3+ T-cells, with some CD20+ B-cells            Interpretation performed at [facility], [address] [place] [address], [facility] [address]            Electronically signed by Lorraine Sharp MD on 4/27/2018 at  4:00 PM         Labs:      Imaging  No results found  I reviewed the above laboratory and imaging data  Discussion/Summary:   74-year-old female status post wide excision of a possible left thigh melanoma in situ  Review of her initial pathology just revealed atypical intraepidermal melanocytic proliferation  This was not called melanoma situ  She was treated as melanoma in situ 0 5 cm margins  Her final pathology reveals no residual tumor  I have recommended observation  She will continue to follow up with her dermatologist   I will see her again in the future should the need arise  She is agreeable to this  All her questions were answered

## 2018-05-24 DIAGNOSIS — F41.9 ANXIETY: ICD-10-CM

## 2018-05-24 RX ORDER — ALPRAZOLAM 0.25 MG/1
0.25 TABLET ORAL 2 TIMES DAILY PRN
Qty: 180 TABLET | Refills: 0 | Status: SHIPPED | OUTPATIENT
Start: 2018-05-24 | End: 2018-09-14 | Stop reason: ALTCHOICE

## 2018-08-09 ENCOUNTER — OFFICE VISIT (OUTPATIENT)
Dept: FAMILY MEDICINE CLINIC | Facility: CLINIC | Age: 73
End: 2018-08-09
Payer: MEDICARE

## 2018-08-09 VITALS
TEMPERATURE: 98.6 F | WEIGHT: 147.4 LBS | DIASTOLIC BLOOD PRESSURE: 60 MMHG | SYSTOLIC BLOOD PRESSURE: 138 MMHG | BODY MASS INDEX: 28.79 KG/M2 | RESPIRATION RATE: 16 BRPM

## 2018-08-09 DIAGNOSIS — M79.641 RIGHT HAND PAIN: Primary | ICD-10-CM

## 2018-08-09 PROCEDURE — 99213 OFFICE O/P EST LOW 20 MIN: CPT | Performed by: INTERNAL MEDICINE

## 2018-08-09 RX ORDER — NAPROXEN 375 MG/1
375 TABLET, DELAYED RELEASE ORAL 2 TIMES DAILY WITH MEALS
Qty: 28 EACH | Refills: 0 | Status: SHIPPED | OUTPATIENT
Start: 2018-08-09 | End: 2018-10-18

## 2018-08-09 NOTE — PATIENT INSTRUCTIONS
Get xray  Take anti inflammatory twice a day- no adevil, aleve or motrin with this   Get labs if no better  Call in one week with uipdate - if no better may change to prednisone

## 2018-08-09 NOTE — ASSESSMENT & PLAN NOTE
Add anti inflammatory twice a day for up 2 weeks  Check xray- rule out ra  Check labs  Call with update in 1 week

## 2018-08-09 NOTE — PROGRESS NOTES
ASSESSMENT and PLAN:  Sabra Schwab is a 72 y o  female with:   Problem List Items Addressed This Visit     Right hand pain - Primary     Add anti inflammatory twice a day for up 2 weeks  Check xray- rule out ra  Check labs  Call with update in 1 week          Relevant Medications    naproxen (EC NAPROSYN) 375 MG TBEC    Other Relevant Orders    XR hand 3+ vw right    RF Screen w/ Reflex to Titer    Sedimentation rate, automated    Lyme Antibody Profile with reflex to WB          SUBJECTIVE:  Sabra Schwab is a 72 y o  female who presents today with a chief complaint of Hand Pain (Right)    Patient with swelling right hand for a month  She denies trauma to hand  It is painful and swollen  Has not responded to oral nsaids otc she has some knee pain also       Review of Systems   Constitutional: Negative  HENT: Negative  Eyes: Negative  Respiratory: Negative  Cardiovascular: Negative  Gastrointestinal: Negative  Endocrine: Negative  Genitourinary: Negative  Musculoskeletal: Positive for arthralgias (right hand pain and swelling  )  Allergic/Immunologic: Negative  Neurological: Negative  Hematological: Negative  Psychiatric/Behavioral: Negative  I have reviewed the patient's PMH, Social History, Medication List and Allergies  OBJECTIVE:  /60 (BP Location: Right arm)   Temp 98 6 °F (37 °C) (Tympanic)   Resp 16   Wt 66 9 kg (147 lb 6 4 oz)   BMI 28 79 kg/m²   Physical Exam   Constitutional: She appears well-developed and well-nourished  HENT:   Head: Normocephalic and atraumatic  Right Ear: External ear normal    Left Ear: External ear normal    Nose: Nose normal    Mouth/Throat: Oropharynx is clear and moist    Pulmonary/Chest: Effort normal and breath sounds normal    Musculoskeletal: She exhibits tenderness (pain and swelling right hand with decreased gripno active synovitis noted;  no rash)  Skin: Skin is warm and dry  No rash noted  No erythema  Nursing note and vitals reviewed

## 2018-08-10 ENCOUNTER — APPOINTMENT (OUTPATIENT)
Dept: RADIOLOGY | Facility: CLINIC | Age: 73
End: 2018-08-10
Payer: MEDICARE

## 2018-08-10 DIAGNOSIS — M79.641 RIGHT HAND PAIN: ICD-10-CM

## 2018-08-10 PROCEDURE — 73130 X-RAY EXAM OF HAND: CPT

## 2018-08-14 ENCOUNTER — APPOINTMENT (OUTPATIENT)
Dept: LAB | Age: 73
End: 2018-08-14
Payer: MEDICARE

## 2018-08-14 ENCOUNTER — TRANSCRIBE ORDERS (OUTPATIENT)
Dept: ADMINISTRATIVE | Age: 73
End: 2018-08-14

## 2018-08-14 DIAGNOSIS — M79.641 RIGHT HAND PAIN: ICD-10-CM

## 2018-08-14 LAB — ERYTHROCYTE [SEDIMENTATION RATE] IN BLOOD: 14 MM/HOUR (ref 0–20)

## 2018-08-14 PROCEDURE — 85652 RBC SED RATE AUTOMATED: CPT

## 2018-08-14 PROCEDURE — 36415 COLL VENOUS BLD VENIPUNCTURE: CPT

## 2018-08-14 PROCEDURE — 86618 LYME DISEASE ANTIBODY: CPT

## 2018-08-14 PROCEDURE — 86430 RHEUMATOID FACTOR TEST QUAL: CPT

## 2018-08-15 ENCOUNTER — TELEPHONE (OUTPATIENT)
Dept: FAMILY MEDICINE CLINIC | Facility: CLINIC | Age: 73
End: 2018-08-15

## 2018-08-15 LAB
B BURGDOR IGG SER IA-ACNC: 0.29
B BURGDOR IGM SER IA-ACNC: 0.15
RHEUMATOID FACT SER QL LA: NEGATIVE

## 2018-08-15 NOTE — TELEPHONE ENCOUNTER
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

## 2018-08-27 ENCOUNTER — OFFICE VISIT (OUTPATIENT)
Dept: FAMILY MEDICINE CLINIC | Facility: CLINIC | Age: 73
End: 2018-08-27
Payer: MEDICARE

## 2018-08-27 ENCOUNTER — APPOINTMENT (OUTPATIENT)
Dept: LAB | Facility: CLINIC | Age: 73
End: 2018-08-27
Payer: MEDICARE

## 2018-08-27 ENCOUNTER — TELEPHONE (OUTPATIENT)
Dept: FAMILY MEDICINE CLINIC | Facility: CLINIC | Age: 73
End: 2018-08-27

## 2018-08-27 ENCOUNTER — HOSPITAL ENCOUNTER (OUTPATIENT)
Dept: CT IMAGING | Facility: HOSPITAL | Age: 73
Discharge: HOME | End: 2018-08-27
Payer: MEDICARE

## 2018-08-27 VITALS
TEMPERATURE: 98.9 F | RESPIRATION RATE: 18 BRPM | DIASTOLIC BLOOD PRESSURE: 72 MMHG | WEIGHT: 145.6 LBS | HEART RATE: 68 BPM | BODY MASS INDEX: 28.44 KG/M2 | SYSTOLIC BLOOD PRESSURE: 132 MMHG

## 2018-08-27 DIAGNOSIS — R10.32 LLQ PAIN: ICD-10-CM

## 2018-08-27 DIAGNOSIS — R10.32 LLQ PAIN: Primary | ICD-10-CM

## 2018-08-27 DIAGNOSIS — K62.5 RECTAL BLEED: ICD-10-CM

## 2018-08-27 LAB
BUN SERPL-MCNC: 16 MG/DL (ref 5–25)
CREAT SERPL-MCNC: 1 MG/DL (ref 0.6–1.3)
GFR SERPL CREATININE-BSD FRML MDRD: 57 ML/MIN/1.73SQ M

## 2018-08-27 PROCEDURE — 74177 CT ABD & PELVIS W/CONTRAST: CPT

## 2018-08-27 PROCEDURE — 36415 COLL VENOUS BLD VENIPUNCTURE: CPT

## 2018-08-27 PROCEDURE — 99213 OFFICE O/P EST LOW 20 MIN: CPT | Performed by: INTERNAL MEDICINE

## 2018-08-27 PROCEDURE — 84520 ASSAY OF UREA NITROGEN: CPT

## 2018-08-27 PROCEDURE — 82565 ASSAY OF CREATININE: CPT

## 2018-08-27 RX ORDER — METRONIDAZOLE 500 MG/1
500 TABLET ORAL EVERY 8 HOURS SCHEDULED
Qty: 21 TABLET | Refills: 0 | Status: SHIPPED | OUTPATIENT
Start: 2018-08-27 | End: 2018-09-03

## 2018-08-27 RX ORDER — CIPROFLOXACIN 500 MG/1
500 TABLET, FILM COATED ORAL EVERY 12 HOURS SCHEDULED
Qty: 14 TABLET | Refills: 0 | Status: SHIPPED | OUTPATIENT
Start: 2018-08-27 | End: 2018-09-03

## 2018-08-27 RX ORDER — METRONIDAZOLE 500 MG/1
500 TABLET ORAL EVERY 8 HOURS SCHEDULED
Qty: 21 TABLET | Refills: 0 | Status: SHIPPED | OUTPATIENT
Start: 2018-08-27 | End: 2018-08-27 | Stop reason: SDUPTHER

## 2018-08-27 RX ORDER — CIPROFLOXACIN 500 MG/1
500 TABLET, FILM COATED ORAL EVERY 12 HOURS SCHEDULED
Qty: 14 TABLET | Refills: 0 | Status: SHIPPED | OUTPATIENT
Start: 2018-08-27 | End: 2018-08-27 | Stop reason: SDUPTHER

## 2018-08-27 RX ADMIN — IOHEXOL 50 ML: 240 INJECTION, SOLUTION INTRATHECAL; INTRAVASCULAR; INTRAVENOUS; ORAL at 13:45

## 2018-08-27 RX ADMIN — IOHEXOL 100 ML: 350 INJECTION, SOLUTION INTRAVENOUS at 14:50

## 2018-08-27 NOTE — TELEPHONE ENCOUNTER
Patient notified of message  She is asking if you would please put in a referral for her to go to [facility]   Thank you

## 2018-08-27 NOTE — TELEPHONE ENCOUNTER
Spoke with pharmacist   The original prescription that you sent to [facility] was re-activated at [facility] over the phone  The patient wanted [facility] but [facility] because [facility] already filled it  Patient aware and pharmacy aware  I also spoke with patient in regards to antibiotics and that she can take them before CT Scan

## 2018-08-27 NOTE — TELEPHONE ENCOUNTER
----- Message from Kamille Tavarez,  sent at 8/27/2018  4:00 PM EDT -----  Call pt- ct scan shows colitiis - in her colon-  Start the abx and update me on Thursday-  I do want her to see a gi for follow up or colo rectal - l

## 2018-08-27 NOTE — PROGRESS NOTES
ASSESSMENT and PLAN:  Hebert Treviño is a 72 y o  female with:   Problem List Items Addressed This Visit     LLQ pain - Primary    Relevant Medications    ciprofloxacin (CIPRO) 500 mg tablet    metroNIDAZOLE (FLAGYL) 500 mg tablet    Other Relevant Orders    CT abdomen pelvis w contrast    BUN    Creatinine, serum    Rectal bleed     Probable bleeding from hemorrhoids  Refer back to dr Floridalma Coello after ct scan and abx  Call with update in 24 hours  Relevant Orders    BUN    Creatinine, serum          SUBJECTIVE:  Hebert Treviño is a 72 y o  female who presents today with a chief complaint of Rectal Bleeding and Abdominal Pain    Last week pt had sudden onset lower ab pain, loose bm- has bm senssation every 20 minutes- noted blood onpaper- red; She had no stool today; Her abdomen has cramping sensation and she has frequent belching  No fever  She has occasional sensation fo hot flashes  Review of Systems   Constitutional: Positive for chills and fatigue  HENT: Negative  Eyes: Negative  Respiratory: Negative  Cardiovascular: Negative  Gastrointestinal: Positive for blood in stool, constipation, diarrhea and rectal pain  Endocrine: Negative  Genitourinary: Negative  Musculoskeletal: Negative  Skin: Negative  Allergic/Immunologic: Negative  Neurological: Negative  Hematological: Negative  Psychiatric/Behavioral: Negative  I have reviewed the patient's PMH, Social History, Medication List and Allergies  OBJECTIVE:  /72 (BP Location: Left arm, Patient Position: Sitting, Cuff Size: Large)   Pulse 68   Temp 98 9 °F (37 2 °C) (Tympanic)   Resp 18   Wt 66 kg (145 lb 9 6 oz)   BMI 28 44 kg/m²   Physical Exam   Constitutional: She is oriented to person, place, and time  She appears well-developed and well-nourished  HENT:   Head: Normocephalic and atraumatic     Right Ear: External ear normal    Left Ear: External ear normal    Eyes: Conjunctivae and EOM are normal  Pupils are equal, round, and reactive to light  Neck: Normal range of motion  Neck supple  No JVD present  No tracheal deviation present  No thyromegaly present  Cardiovascular: Normal rate, regular rhythm, normal heart sounds and intact distal pulses  Pulmonary/Chest: Effort normal and breath sounds normal    Abdominal: Soft  She exhibits distension  She exhibits no shifting dullness, no pulsatile liver and no abdominal bruit  Bowel sounds are increased  There is no hepatosplenomegaly or hepatomegaly  There is tenderness  There is guarding  There is no rebound and no CVA tenderness  No hernia  Musculoskeletal: Normal range of motion  Neurological: She is alert and oriented to person, place, and time  Skin: Skin is warm and dry  No rash (llq tenderness; no rebound;  some guarding; increase bs ) noted  No erythema  Psychiatric: She has a normal mood and affect  Her behavior is normal  Thought content normal    Nursing note and vitals reviewed

## 2018-08-27 NOTE — TELEPHONE ENCOUNTER
PT calling to request you resend her RXs to [facility]  [facility] is giving her a hard time about filling the medication      She also wants to know if she should be starting the antibiotic immediately?    [phone]

## 2018-08-27 NOTE — ASSESSMENT & PLAN NOTE
Probable bleeding from hemorrhoids  Refer back to dr Briana Capps after ct scan and abx  Call with update in 24 hours

## 2018-08-28 DIAGNOSIS — K52.9 COLITIS: Primary | ICD-10-CM

## 2018-08-30 ENCOUNTER — TELEPHONE (OUTPATIENT)
Dept: FAMILY MEDICINE CLINIC | Facility: CLINIC | Age: 73
End: 2018-08-30

## 2018-08-30 NOTE — TELEPHONE ENCOUNTER
Patient called back to update  She stated she is feeling better and she is seeing Dr Berna Scott on  9-  Patient is asking if the antibiotic would cause her to have runny stool and wants to make sure that is a normal side effect  Please advise   Patient would like a return call [phone]

## 2018-08-30 NOTE — TELEPHONE ENCOUNTER
Patient notified, she stated her stool is runny and like mucous  She doesn't feel that it's diarrhea  She wanted to make sure this was normal with her condition  Please advise

## 2018-09-14 PROBLEM — K62.5 RECTAL BLEED: Status: RESOLVED | Noted: 2018-08-27 | Resolved: 2018-09-14

## 2018-09-14 PROBLEM — R93.5 ABNORMAL CT OF THE ABDOMEN: Status: ACTIVE | Noted: 2018-09-14

## 2018-09-15 ENCOUNTER — TRANSCRIBE ORDERS (OUTPATIENT)
Dept: ADMINISTRATIVE | Age: 73
End: 2018-09-15

## 2018-09-15 ENCOUNTER — APPOINTMENT (OUTPATIENT)
Dept: LAB | Age: 73
End: 2018-09-15
Payer: MEDICARE

## 2018-09-15 DIAGNOSIS — R93.5 ABNORMAL CT OF THE ABDOMEN: ICD-10-CM

## 2018-09-15 LAB
CAMPYLOBACTER DNA SPEC NAA+PROBE: NORMAL
SALMONELLA DNA SPEC QL NAA+PROBE: NORMAL
SHIGA TOXIN STX GENE SPEC NAA+PROBE: NORMAL
SHIGELLA DNA SPEC QL NAA+PROBE: NORMAL

## 2018-09-15 PROCEDURE — 87505 NFCT AGENT DETECTION GI: CPT

## 2018-09-17 ENCOUNTER — LAB REQUISITION (OUTPATIENT)
Dept: LAB | Facility: HOSPITAL | Age: 73
End: 2018-09-17
Payer: MEDICARE

## 2018-09-17 DIAGNOSIS — D12.3 BENIGN NEOPLASM OF TRANSVERSE COLON: ICD-10-CM

## 2018-09-17 DIAGNOSIS — K55.20 ANGIODYSPLASIA OF COLON WITHOUT HEMORRHAGE: ICD-10-CM

## 2018-09-17 DIAGNOSIS — R93.3 ABNORMAL FINDINGS ON DIAGNOSTIC IMAGING OF OTHER PARTS OF DIGESTIVE TRACT: ICD-10-CM

## 2018-09-17 DIAGNOSIS — R19.7 DIARRHEA: ICD-10-CM

## 2018-09-17 PROCEDURE — 88305 TISSUE EXAM BY PATHOLOGIST: CPT | Performed by: PATHOLOGY

## 2018-10-18 ENCOUNTER — OFFICE VISIT (OUTPATIENT)
Dept: FAMILY MEDICINE CLINIC | Facility: CLINIC | Age: 73
End: 2018-10-18
Payer: MEDICARE

## 2018-10-18 VITALS
BODY MASS INDEX: 29.09 KG/M2 | RESPIRATION RATE: 18 BRPM | HEIGHT: 60 IN | SYSTOLIC BLOOD PRESSURE: 150 MMHG | TEMPERATURE: 98.2 F | WEIGHT: 148.2 LBS | HEART RATE: 76 BPM | DIASTOLIC BLOOD PRESSURE: 80 MMHG

## 2018-10-18 DIAGNOSIS — H61.23 EXCESSIVE CERUMEN IN EAR CANAL, BILATERAL: Primary | ICD-10-CM

## 2018-10-18 DIAGNOSIS — M19.041 LOCALIZED OSTEOARTHRITIS OF RIGHT HAND: ICD-10-CM

## 2018-10-18 DIAGNOSIS — Z23 NEED FOR INFLUENZA VACCINATION: ICD-10-CM

## 2018-10-18 PROBLEM — R10.32 LLQ PAIN: Status: RESOLVED | Noted: 2018-08-27 | Resolved: 2018-10-18

## 2018-10-18 PROBLEM — F41.8 SITUATIONAL ANXIETY: Status: RESOLVED | Noted: 2017-01-17 | Resolved: 2018-10-18

## 2018-10-18 PROCEDURE — 69210 REMOVE IMPACTED EAR WAX UNI: CPT | Performed by: FAMILY MEDICINE

## 2018-10-18 PROCEDURE — 99213 OFFICE O/P EST LOW 20 MIN: CPT | Performed by: FAMILY MEDICINE

## 2018-10-18 PROCEDURE — G0008 ADMIN INFLUENZA VIRUS VAC: HCPCS

## 2018-10-18 PROCEDURE — 90662 IIV NO PRSV INCREASED AG IM: CPT

## 2018-10-18 RX ORDER — LANOLIN ALCOHOL/MO/W.PET/CERES
1 CREAM (GRAM) TOPICAL 2 TIMES DAILY
COMMUNITY
End: 2020-01-28

## 2018-10-18 RX ORDER — ACETAMINOPHEN 500 MG
1000 TABLET ORAL 3 TIMES DAILY PRN
Qty: 30 TABLET | Refills: 0
Start: 2018-10-18 | End: 2020-01-28

## 2018-10-18 RX ORDER — MELATONIN
1000 DAILY
COMMUNITY

## 2018-10-18 NOTE — ASSESSMENT & PLAN NOTE
mammo swelling of right hand, reviewed x-ray that showed mild osteoarthritis  Advised that it is okay to continue glucosamine hand Co Q 10, would advise that she take Tylenol as needed for pain, and if the pain is severe enough then I would advise she take an NSAID only on a p r n  Basis

## 2018-10-18 NOTE — PROGRESS NOTES
FAMILY MEDICINE PROGRESS NOTE  Keyon Cole 72 y o  female   DATE: October 18, 2018     ASSESSMENT and PLAN:  Keyon Cole is a 72 y o  female with:     Localized osteoarthritis of right hand   mammo swelling of right hand, reviewed x-ray that showed mild osteoarthritis  Advised that it is okay to continue glucosamine hand Co Q 10, would advise that she take Tylenol as needed for pain, and if the pain is severe enough then I would advise she take an NSAID only on a p r n  Basis  Excessive cerumen in ear canal, bilateral    Patient referred here by audiologist because she had a right ear cerumen impaction, flushed out both ears today successfully per procedure note below  Need for influenza vaccination  Vaccine Counseling: Discussed with: Patient  The benefits, contraindication and side effects for the following vaccines were reviewed: Immunization component list: influenza  Total number of components reveiwed:1      Ear cerumen removal  Date/Time: 10/18/2018 10:00 AM  Performed by: Carlos Morris Authorized by: Carlos Morris Patient location:  Clinic  Other Assisting Provider: No    Consent:     Consent obtained:  Verbal    Consent given by:  Patient    Risks discussed:  Bleeding, incomplete removal and pain    Alternatives discussed:  No treatment, alternative treatment and observation  Universal protocol:     Procedure explained and questions answered to patient or proxy's satisfaction: yes      Patient identity confirmed:  Verbally with patient  Procedure details:     Local anesthetic: hydrogen peroxide and warm water  Location:  L ear and R ear    Procedure type: curette      Procedure type comment:  With irrigation    Approach:  External    Visualization (free text):  Able to visualize TMs after procedure  Post-procedure details:     Complication:  None    Hearing quality:  Improved    Patient tolerance of procedure:   Tolerated well, no immediate complications  Comments:      Able to visualize TMs after procedure      SUBJECTIVE:  Henry Stallings is a 72 y o  female who presents today with a chief complaint of Cerumen Impaction (right ear clogged  wants you to check left as well ) and Hand Pain (diagnosed with arthritis in her hand  wants suggestions as to what to do )  Pt here with 2 concerns:    1  She feels her ears are clogged, definitely right ear  She wears hearing aids, her audiologist said she needs her right ear clogged  2  Right hand x-ray done in August 2018 showed mild osteoarthritis and she had mild swelling  She wants to know what she can take for the pain, it is worse at the end of the day  She is trying glucosamine for this and her knees and she doesn't think its helping  She wants to know what to take  Review of Systems   HENT: Positive for hearing loss  Negative for ear discharge and ear pain  Musculoskeletal: Positive for arthralgias and joint swelling  I have reviewed the patient's PMH, Social History, Medication List and Allergies as appropriate  OBJECTIVE:  /80 (BP Location: Left arm, Patient Position: Sitting, Cuff Size: Standard)   Pulse 76   Temp 98 2 °F (36 8 °C)   Resp 18   Ht 5' (1 524 m)   Wt 67 2 kg (148 lb 3 2 oz)   BMI 28 94 kg/m²    Physical Exam   Constitutional: She appears well-developed and well-nourished  No distress  HENT:   Complete right ear cerumen impaction with mild left ear cerumen, both flushed successfully and could visualize TMs after procedure  Musculoskeletal:        Right wrist: She exhibits swelling (mild)  She exhibits normal range of motion, no tenderness, no bony tenderness, no effusion and no crepitus  Skin: She is not diaphoretic  [place]  Roberth Lam MD    Note: Portions of the record may have been created with voice recognition software  Occasional wrong word or "sound a like" substitutions may have occurred due to the inherent limitations of voice recognition software   Read the chart carefully and recognize, using context, where substitutions have occurred

## 2018-10-18 NOTE — ASSESSMENT & PLAN NOTE
Patient referred here by audiologist because she had a right ear cerumen impaction, flushed out both ears today successfully per procedure note below

## 2018-10-18 NOTE — PATIENT INSTRUCTIONS
Cerumen Impaction   WHAT YOU NEED TO KNOW:   What is cerumen impaction? Cerumen impaction is the blockage of the outer ear canal by tightly packed cerumen (earwax)  What causes cerumen impaction? Anything that affects the normal outward flow of cerumen may cause impaction  The following factors may make it more likely for earwax to become impacted:  · Advanced age     · Conditions that produce too much cerumen, such as keratosis and other skin diseases    · Narrow or abnormally shaped ear canals    · Use of a hearing aid    · Incorrect use of cotton swabs, or using needles, hair pins, or other objects to clean the ears  What are the signs and symptoms of cerumen impaction? · Trouble hearing    · Dizziness    · Ear fullness or a feeling that something is plugging up your ear    · Itchiness or pain in the ears    · Ringing in the ears  How is cerumen impaction diagnosed? Your healthcare provider will ask about your medical history  This includes any ear problems or procedures you may have had  Your healthcare provider will carefully check your ears using a scope with a light  Your healthcare provider may look for other problems, such as bleeding, infection, or injury  Your eardrums will be checked for tears or holes  Your healthcare provider may also test your hearing  How is cerumen impaction treated? The goal of treatment is to remove the hardened wax  The type of treatment to be used may depend on your age, symptoms, or risk factors  Ask your healthcare provider which of the following treatments may be best for you:  · Ear drops:  Ear drops may be used to clear or soften the impacted earwax  This may be used alone or in combination with a procedure to take out the earwax      · Procedures:  When the impaction can be clearly seen, removal may be done using any of the following:    ¨ Irrigation:  Warm water from a syringe is used to wash the wax out of the ear canal  Irrigation may not be used on people with an eardrum tear, infection, or who have had ear surgery  ¨ Suction:  A small plastic tube that is connected to a machine is used to suck the impacted wax out of your ear  ¨ Instruments:  Your healthcare provider may use a curette (scoop-like instrument) or forceps to remove the impacted wax  What are the risks of having a cerumen impaction? · Procedures to remove the wax may cause bleeding and infection  The ear canal may be scraped and scratched or the eardrum may be injured, which may cause loss of hearing  · Untreated impacted cerumen may cause your symptoms to become worse  If it is not removed, impacted cerumen may cause an infection, irritation, loss of hearing, or further ear problems  When should I contact my healthcare provider? · You have a fever  · You have trouble hearing or hear ringing noises  · You have questions or concerns about your condition or care  When should I seek immediate care? · You feel dizzy  · You have discharge or blood coming out of your ear  · Your ear pain does not go away or gets worse  CARE AGREEMENT:   You have the right to help plan your care  Learn about your health condition and how it may be treated  Discuss treatment options with your caregivers to decide what care you want to receive  You always have the right to refuse treatment  The above information is an  only  It is not intended as medical advice for individual conditions or treatments  Talk to your doctor, nurse or pharmacist before following any medical regimen to see if it is safe and effective for you  © 2017 [employer]  Information is for End User's use only and may not be sold, redistributed or otherwise used for commercial purposes  All illustrations and images included in CareNotes® are the copyrighted property of A D A M [employer] Inc  or Ervin Aragon

## 2019-01-23 ENCOUNTER — OFFICE VISIT (OUTPATIENT)
Dept: FAMILY MEDICINE CLINIC | Facility: CLINIC | Age: 74
End: 2019-01-23
Payer: COMMERCIAL

## 2019-01-23 VITALS
WEIGHT: 149.6 LBS | RESPIRATION RATE: 16 BRPM | TEMPERATURE: 96.3 F | HEART RATE: 62 BPM | DIASTOLIC BLOOD PRESSURE: 78 MMHG | HEIGHT: 59 IN | BODY MASS INDEX: 30.16 KG/M2 | SYSTOLIC BLOOD PRESSURE: 134 MMHG

## 2019-01-23 DIAGNOSIS — E03.9 ACQUIRED HYPOTHYROIDISM: ICD-10-CM

## 2019-01-23 DIAGNOSIS — Z12.39 SCREENING FOR MALIGNANT NEOPLASM OF BREAST: ICD-10-CM

## 2019-01-23 DIAGNOSIS — Z13.6 SCREENING FOR CARDIOVASCULAR CONDITION: ICD-10-CM

## 2019-01-23 DIAGNOSIS — M19.041 LOCALIZED OSTEOARTHRITIS OF RIGHT HAND: ICD-10-CM

## 2019-01-23 DIAGNOSIS — M85.80 BORDERLINE OSTEOPENIA: ICD-10-CM

## 2019-01-23 DIAGNOSIS — R29.890 LOSS OF HEIGHT: ICD-10-CM

## 2019-01-23 DIAGNOSIS — Z11.59 ENCOUNTER FOR HEPATITIS C SCREENING TEST FOR LOW RISK PATIENT: ICD-10-CM

## 2019-01-23 DIAGNOSIS — Z00.00 MEDICARE ANNUAL WELLNESS VISIT, SUBSEQUENT: ICD-10-CM

## 2019-01-23 DIAGNOSIS — E66.09 CLASS 1 OBESITY DUE TO EXCESS CALORIES WITH SERIOUS COMORBIDITY AND BODY MASS INDEX (BMI) OF 30.0 TO 30.9 IN ADULT: ICD-10-CM

## 2019-01-23 DIAGNOSIS — I10 ESSENTIAL HYPERTENSION: ICD-10-CM

## 2019-01-23 DIAGNOSIS — E55.9 MILD VITAMIN D DEFICIENCY: ICD-10-CM

## 2019-01-23 DIAGNOSIS — E78.2 MIXED HYPERLIPIDEMIA: Primary | ICD-10-CM

## 2019-01-23 DIAGNOSIS — D50.8 IRON DEFICIENCY ANEMIA SECONDARY TO INADEQUATE DIETARY IRON INTAKE: ICD-10-CM

## 2019-01-23 PROBLEM — H61.23 EXCESSIVE CERUMEN IN EAR CANAL, BILATERAL: Status: RESOLVED | Noted: 2018-10-18 | Resolved: 2019-01-23

## 2019-01-23 PROBLEM — R93.5 ABNORMAL CT OF THE ABDOMEN: Status: RESOLVED | Noted: 2018-09-14 | Resolved: 2019-01-23

## 2019-01-23 PROBLEM — E66.811 CLASS 1 OBESITY DUE TO EXCESS CALORIES WITH SERIOUS COMORBIDITY AND BODY MASS INDEX (BMI) OF 30.0 TO 30.9 IN ADULT: Status: ACTIVE | Noted: 2019-01-23

## 2019-01-23 PROBLEM — Z23 NEED FOR INFLUENZA VACCINATION: Status: RESOLVED | Noted: 2018-10-18 | Resolved: 2019-01-23

## 2019-01-23 PROCEDURE — 3075F SYST BP GE 130 - 139MM HG: CPT | Performed by: FAMILY MEDICINE

## 2019-01-23 PROCEDURE — G0439 PPPS, SUBSEQ VISIT: HCPCS | Performed by: FAMILY MEDICINE

## 2019-01-23 PROCEDURE — 3725F SCREEN DEPRESSION PERFORMED: CPT | Performed by: FAMILY MEDICINE

## 2019-01-23 PROCEDURE — 1125F AMNT PAIN NOTED PAIN PRSNT: CPT | Performed by: FAMILY MEDICINE

## 2019-01-23 PROCEDURE — 3078F DIAST BP <80 MM HG: CPT | Performed by: FAMILY MEDICINE

## 2019-01-23 PROCEDURE — 1170F FXNL STATUS ASSESSED: CPT | Performed by: FAMILY MEDICINE

## 2019-01-23 PROCEDURE — 99214 OFFICE O/P EST MOD 30 MIN: CPT | Performed by: FAMILY MEDICINE

## 2019-01-23 RX ORDER — LISINOPRIL 10 MG/1
10 TABLET ORAL DAILY
Qty: 90 TABLET | Refills: 3 | Status: SHIPPED | OUTPATIENT
Start: 2019-01-23 | End: 2019-11-28 | Stop reason: SDUPTHER

## 2019-01-23 NOTE — PROGRESS NOTES
FAMILY MEDICINE PROGRESS NOTE  Cathi Taylor 72 y o  female   DATE: January 23, 2019     ASSESSMENT and PLAN:  Cathi Taylor is a 72 y o  female with:     Mixed hyperlipidemia  Last Lipid Panel:  Lab Results   Component Value Date    CHOLESTEROL 238 (H) 01/23/2018    HDL 56 01/23/2018    LDLC 149 (H) 01/23/2018    TRIG 165 (H) 01/23/2018     The 10-year ASCVD risk score (Jamaal Woody et al , 2013) is: 15 8%    Values used to calculate the score:      Age: 72 years      Sex: Female      Is Non- : No      Diabetic: No      Tobacco smoker: No      Systolic Blood Pressure: 648 mmHg      Is BP treated: Yes      HDL Cholesterol: 56 mg/dL      Total Cholesterol: 238 mg/dL    Currently only Rosuvastatin 2 5mg every other day  Continue ASA 81mg  Recheck FLP    Mild vitamin D deficiency  Currently on Vit D 1400 U, recheck Vit D level    Iron deficiency anemia secondary to inadequate dietary iron intake  Is currently on FeSO4 325mg as a preventive measure  Recheck CBC    Borderline osteopenia  Last DEXA showed osteopenia in 2014, and has lost 1 inch in height    I recommend that you take either Ergocalciferol (Vit D) 800-1000 units daily with Calcium carbonate 1200mg daily or Calcium citrate 1200mg daily      Abnormal TSH  Lab Results   Component Value Date    QIE6AXFOXDUH 5 960 (H) 10/27/2017    TSH 4 440 01/23/2018     Abnormal in 2017, but then normal, so will recheck    Essential hypertension  BP Readings from Last 3 Encounters:   01/23/19 134/78   10/18/18 150/80   08/27/18 132/72       Results from last 6 Months  Lab Units 08/27/18  1312   BUN mg/dL 16   CREATININE mg/dL 1 00     Controlled on current regimen:  -Lisinopril 10mg  -Recheck BMP    Localized osteoarthritis of right hand  Still having pain, not interested in work up for referral at this time, will let me know when the pain is unbearable    Class 1 obesity due to excess calories with serious comorbidity and body mass index (BMI) of 30 0 to 30 9 in adult  Wt Readings from Last 3 Encounters:   01/23/19 67 9 kg (149 lb 9 6 oz)   10/18/18 67 2 kg (148 lb 3 2 oz)   09/14/18 65 1 kg (143 lb 8 oz)     BMI Counseling: Body mass index is 30 22 kg/m²  Discussed the patient's BMI with her  The BMI is above average  BMI counseling and education was provided to the patient  Nutrition recommendations include reducing portion sizes, decreasing overall calorie intake, 3-5 servings of fruits/vegetables daily, consuming healthier snacks, decreasing soda and/or juice intake, moderation in carbohydrate intake and reducing intake of cholesterol  Exercise recommendations include moderate aerobic physical activity for 150 minutes/week and exercising 3-5 times per week  SUBJECTIVE:  Armin Cortez is a 72 y o  female who presents today with a chief complaint of Medicare Wellness Visit (Subsequent annual wellness)  Pt is here for [facility] and chronic/acute follow-up  1  HTN- controlled on Lisinopril for 8 years, wants Rx sent to [facility] Rx  2  Hand arthritis- still has shooting pain, not ready to see specialist  3  Foot problem- Dr Joyce Lamas, had an injection recently  Also has a knee problem, bakers cyst  4  HLD- on Crestor every other day, but still uncontrolled      Review of Systems   Eyes: Negative for visual disturbance  Respiratory: Negative for shortness of breath  Cardiovascular: Negative for chest pain and palpitations  Musculoskeletal: Positive for arthralgias  Neurological: Negative for dizziness and light-headedness  I have reviewed the patient's PMH, Social History, Medication List and Allergies as appropriate  OBJECTIVE:  /78 (BP Location: Left arm, Patient Position: Sitting, Cuff Size: Standard)   Pulse 62   Temp (!) 96 3 °F (35 7 °C)   Resp 16   Ht 4' 11" (1 499 m)   Wt 67 9 kg (149 lb 9 6 oz)   Breastfeeding? No   BMI 30 22 kg/m²    Physical Exam   Constitutional: She appears well-developed and well-nourished   No distress  Cardiovascular: Normal rate, regular rhythm and normal heart sounds  Pulmonary/Chest: Effort normal and breath sounds normal  No respiratory distress  She has no wheezes  She has no rales  Skin: She is not diaphoretic  Vitals reviewed  Adriel Pedroza MD    Note: Portions of the record may have been created with voice recognition software  Occasional wrong word or "sound a like" substitutions may have occurred due to the inherent limitations of voice recognition software  Read the chart carefully and recognize, using context, where substitutions have occurred

## 2019-01-23 NOTE — PATIENT INSTRUCTIONS
I recommend that you take either Ergocalciferol (Vit D) 800-1000 units daily with Calcium carbonate 1200mg daily or Calcium citrate 1200mg daily      Obesity   AMBULATORY CARE:   Obesity  is when your body mass index (BMI) is greater than 30  Your healthcare provider will use your height and weight to measure your BMI  The risks of obesity include  many health problems, such as injuries or physical disability  You may need tests to check for the following:  · Diabetes     · High blood pressure or high cholesterol     · Heart disease     · Gallbladder or liver disease     · Cancer of the colon, breast, prostate, liver, or kidney     · Sleep apnea     · Arthritis or gout  Seek care immediately if:   · You have a severe headache, confusion, or difficulty speaking  · You have weakness on one side of your body  · You have chest pain, sweating, or shortness of breath  Contact your healthcare provider if:   · You have symptoms of gallbladder or liver disease, such as pain in your upper abdomen  · You have knee or hip pain and discomfort while walking  · You have symptoms of diabetes, such as intense hunger and thirst, and frequent urination  · You have symptoms of sleep apnea, such as snoring or daytime sleepiness  · You have questions or concerns about your condition or care  Treatment for obesity  focuses on helping you lose weight to improve your health  Even a small decrease in BMI can reduce the risk for many health problems  Your healthcare provider will help you set a weight-loss goal   · Lifestyle changes  are the first step in treating obesity  These include making healthy food choices and getting regular physical activity  Your healthcare provider may suggest a weight-loss program that involves coaching, education, and therapy  · Medicine  may help you lose weight when it is used with a healthy diet and physical activity       · Surgery  can help you lose weight if you are very obese and have other health problems  There are several types of weight-loss surgery  Ask your healthcare provider for more information  Be successful losing weight:   · Set small, realistic goals  An example of a small goal is to walk for 20 minutes 5 days a week  Yakov goal is to lose 5% of your body weight  · Tell friends, family members, and coworkers about your goals  and ask for their support  Ask a friend to lose weight with you, or join a weight-loss support group  · Identify foods or triggers that may cause you to overeat , and find ways to avoid them  Remove tempting high-calorie foods from your home and workplace  Place a bowl of fresh fruit on your kitchen counter  If stress causes you to eat, then find other ways to cope with stress  · Keep a diary to track what you eat and drink  Also write down how many minutes of physical activity you do each day  Weigh yourself once a week and record it in your diary  Eating changes: You will need to eat 500 to 1,000 fewer calories each day than you currently eat to lose 1 to 2 pounds a week  The following changes will help you cut calories:  · Eat smaller portions  Use small plates, no larger than 9 inches in diameter  Fill your plate half full of fruits and vegetables  Measure your food using measuring cups until you know what a serving size looks like  · Eat 3 meals and 1 or 2 snacks each day  Plan your meals in advance  Daniel Rollins and eat at home most of the time  Eat slowly  · Eat fruits and vegetables at every meal   They are low in calories and high in fiber, which makes you feel full  Do not add butter, margarine, or cream sauce to vegetables  Use herbs to season steamed vegetables  · Eat less fat and fewer fried foods  Eat more baked or grilled chicken and fish  These protein sources are lower in calories and fat than red meat  Limit fast food  Dress your salads with olive oil and vinegar instead of bottled dressing       · Limit the amount of sugar you eat  Do not drink sugary beverages  Limit alcohol  Activity changes:  Physical activity is good for your body in many ways  It helps you burn calories and build strong muscles  It decreases stress and depression, and improves your mood  It can also help you sleep better  Talk to your healthcare provider before you begin an exercise program   · Exercise for at least 30 minutes 5 days a week  Start slowly  Set aside time each day for physical activity that you enjoy and that is convenient for you  It is best to do both weight training and an activity that increases your heart rate, such as walking, bicycling, or swimming  · Find ways to be more active  Do yard work and housecleaning  Walk up the stairs instead of using elevators  Spend your leisure time going to events that require walking, such as outdoor festivals or fairs  This extra physical activity can help you lose weight and keep it off  Follow up with your healthcare provider as directed: You may need to meet with a dietitian  Write down your questions so you remember to ask them during your visits  © 2017 [facility] Information is for End User's use only and may not be sold, redistributed or otherwise used for commercial purposes  All illustrations and images included in CareNotes® are the copyrighted property of A D A M , Inc  or Ervin Aragon  The above information is an  only  It is not intended as medical advice for individual conditions or treatments  Talk to your doctor, nurse or pharmacist before following any medical regimen to see if it is safe and effective for you  Urinary Incontinence   WHAT YOU NEED TO KNOW:   What is urinary incontinence? Urinary incontinence (UI) is when you lose control of your bladder  What causes UI? UI occurs because your bladder cannot store or empty urine properly   The following are the most common types of UI:  · Stress incontinence  is when you leak urine due to increased bladder pressure  This may happen when you cough, sneeze, or exercise  · Urge incontinence  is when you feel the need to urinate right away and leak urine accidentally  · Mixed incontinence  is when you have both stress and urge UI  What are the signs and symptoms of UI?   · You feel like your bladder does not empty completely when you urinate  · You urinate often and need to urinate immediately  · You leak urine when you sleep, or you wake up with the urge to urinate  · You leak urine when you cough, sneeze, exercise, or laugh  How is UI diagnosed? Your healthcare provider will ask how often you leak urine and whether you have stress or urge symptoms  Tell him which medicines you take, how often you urinate, and how much liquid you drink each day  You may need any of the following tests:  · Urine tests  may show infection or kidney function  · A pelvic exam  may be done to check for blockages  A pelvic exam will also show if your bladder, uterus, or other organs have moved out of place  · An x-ray, ultrasound, or CT  may show problems with parts of your urinary system  You may be given contrast liquid to help your organs show up better in the pictures  Tell the healthcare provider if you have ever had an allergic reaction to contrast liquid  Do not enter the MRI room with anything metal  Metal can cause serious injury  Tell the healthcare provider if you have any metal in or on your body  · A bladder scan  will show how much urine is left in your bladder after you urinate  You will be asked to urinate and then healthcare providers will use a small ultrasound machine to check the urine left in your bladder  · Cystometry  is used to check the function of your urinary system  Your healthcare provider checks the pressure in your bladder while filling it with fluid   Your bladder pressure may also be tested when your bladder is full and while you urinate  How is UI treated? · Medicines  can help strengthen your bladder control  · Electrical stimulation  is used to send a small amount of electrical energy to your pelvic floor muscles  This helps control your bladder function  Electrodes may be placed outside your body or in your rectum  For women, the electrodes may be placed in the vagina  · A bulking agent  may be injected into the wall of your urethra to make it thicker  This helps keep your urethra closed and decreases urine leakage  · Devices  such as a clamp, pessary, or tampon may help stop urine leaks  Ask your healthcare provider for more information about these and other devices  · Surgery  may be needed if other treatments do not work  Several types of surgery can help improve your bladder control  Ask your healthcare provider for more information about the surgery you may need  How can I manage my symptoms? · Do pelvic muscle exercises often  Your pelvic muscles help you stop urinating  Squeeze these muscles tight for 5 seconds, then relax for 5 seconds  Gradually work up to squeezing for 10 seconds  Do 3 sets of 15 repetitions a day, or as directed  This will help strengthen your pelvic muscles and improve bladder control  · A catheter  may be used to help empty your bladder  A catheter is a tiny, plastic tube that is put into your bladder to drain your urine  Your healthcare provider may tell you to use a catheter to prevent your bladder from getting too full and leaking urine  · Keep a UI record  Write down how often you leak urine and how much you leak  Make a note of what you were doing when you leaked urine  · Train your bladder  Go to the bathroom at set times, such as every 2 hours, even if you do not feel the urge to go  You can also try to hold your urine when you feel the urge to go  For example, hold your urine for 5 minutes when you feel the urge to go   As that becomes easier, hold your urine for 10 minutes  · Drink liquids as directed  Ask your healthcare provider how much liquid to drink each day and which liquids are best for you  You may need to limit the amount of liquid you drink to help control your urine leakage  Limit or do not have drinks that contain caffeine or alcohol  Do not drink any liquid right before you go to bed  · Prevent constipation  Eat a variety of high-fiber foods  Good examples are high-fiber cereals, beans, vegetables, and whole-grain breads  Prune juice may help make your bowel movement softer  Walking is the best way to trigger your intestines to have a bowel movement  · Exercise regularly and maintain a healthy weight  Ask your healthcare provider how much you should weigh and about the best exercise plan for you  Weight loss and exercise will decrease pressure on your bladder and help you control your leakage  Ask him to help you create a weight loss plan if you are overweight  When should I seek immediate care? · You have severe pain  · You are confused or cannot think clearly  When should I contact my healthcare provider? · You have a fever  · You see blood in your urine  · You have pain when you urinate  · You have new or worse pain, even after treatment  · Your mouth feels dry or you have vision changes  · Your urine is cloudy or smells bad  · You have questions or concerns about your condition or care  CARE AGREEMENT:   You have the right to help plan your care  Learn about your health condition and how it may be treated  Discuss treatment options with your caregivers to decide what care you want to receive  You always have the right to refuse treatment  The above information is an  only  It is not intended as medical advice for individual conditions or treatments  Talk to your doctor, nurse or pharmacist before following any medical regimen to see if it is safe and effective for you    © 2017 [facility] Information is for End User's use only and may not be sold, redistributed or otherwise used for commercial purposes  All illustrations and images included in CareNotes® are the copyrighted property of A D A M , Inc  or Ervin Aragon  Cigarette Smoking and Your Health   AMBULATORY CARE:   Risks to your health if you smoke:  Nicotine and other chemicals found in tobacco damage every cell in your body  Even if you are a light smoker, you have an increased risk for cancer, heart disease, and lung disease  If you are pregnant or have diabetes, smoking increases your risk for complications  Benefits to your health if you stop smoking:   · You decrease respiratory symptoms such as coughing, wheezing, and shortness of breath  · You reduce your risk for cancers of the lung, mouth, throat, kidney, bladder, pancreas, stomach, and cervix  If you already have cancer, you increase the benefits of chemotherapy  You also reduce your risk for cancer returning or a second cancer from developing  · You reduce your risk for heart disease, blood clots, heart attack, and stroke  · You reduce your risk for lung infections, and diseases such as pneumonia, asthma, chronic bronchitis, and emphysema  · Your circulation improves  More oxygen can be delivered to your body  If you have diabetes, you lower your risk for complications, such as kidney, artery, and eye diseases  You also lower your risk for nerve damage  Nerve damage can lead to amputations, poor vision, and blindness  · You improve your body's ability to heal and to fight infections  Benefits to the health of others if you stop smoking:  Tobacco is harmful to nonsmokers who breathe in your secondhand smoke  The following are ways the health of others around you may improve when you stop smoking:  · You lower the risks for lung cancer and heart disease in nonsmoking adults       · If you are pregnant, you lower the risk for miscarriage, early delivery, low birth weight, and stillbirth  You also lower your baby's risk for SIDS, obesity, developmental delay, and neurobehavioral problems, such as ADHD  · If you have children, you lower their risk for ear infections, colds, pneumonia, bronchitis, and asthma  For more information and support to stop smoking:   · Smokefree  gov  Phone: 6- 613 - 921-3252  Web Address: [employer]  Follow up with your healthcare provider as directed:  Write down your questions so you remember to ask them during your visits  © 2017 [address] [facility] Information is for End User's use only and may not be sold, redistributed or otherwise used for commercial purposes  All illustrations and images included in CareNotes® are the copyrighted property of A D A M , Inc  or Ervin Aragon  The above information is an  only  It is not intended as medical advice for individual conditions or treatments  Talk to your doctor, nurse or pharmacist before following any medical regimen to see if it is safe and effective for you  Fall Prevention   AMBULATORY CARE:   Fall prevention  includes ways to make your home and other areas safer  It also includes ways you can move more carefully to prevent a fall  Health conditions that cause changes in your blood pressure, vision, or muscle strength and coordination may increase your risk for falls  Medicines may also increase your risk for falls if they make you dizzy, weak, or sleepy  Call 911 or have someone else call if:   · You have fallen and are unconscious  · You have fallen and cannot move part of your body  Contact your healthcare provider if:   · You have fallen and have pain or a headache  · You have questions or concerns about your condition or care  Fall prevention tips:   · Stand or sit up slowly  This may help you keep your balance and prevent falls  · Use assistive devices as directed    Your healthcare provider may suggest that you use a cane or walker to help you keep your balance  You may need to have grab bars put in your bathroom near the toilet or in the shower  · Wear shoes that fit well and have soles that   Wear shoes both inside and outside  Use slippers with good   Do not wear shoes with high heels  · Wear a personal alarm  This is a device that allows you to call 911 if you fall and need help  Ask your healthcare provider for more information  · Stay active  Exercise can help strengthen your muscles and improve your balance  Your healthcare provider may recommend water aerobics or walking  He or she may also recommend physical therapy to improve your coordination  Never start an exercise program without talking to your healthcare provider first      · Manage your medical conditions  Keep all appointments with your healthcare providers  Visit your eye doctor as directed  Home safety tips:   · Add items to prevent falls in the bathroom  Put nonslip strips on your bath or shower floor to prevent you from slipping  Use a bath mat if you do not have carpet in the bathroom  This will prevent you from falling when you step out of the bath or shower  Use a shower seat so you do not need to stand while you shower  Sit on the toilet or a chair in your bathroom to dry yourself and put on clothing  This will prevent you from losing your balance from drying or dressing yourself while you are standing  · Keep paths clear  Remove books, shoes, and other objects from walkways and stairs  Place cords for telephones and lamps out of the way so that you do not need to walk over them  Tape them down if you cannot move them  Remove small rugs  If you cannot remove a rug, secure it with double-sided tape  This will prevent you from tripping  · Install bright lights in your home  Use night lights to help light paths to the bathroom or kitchen  Always turn on the light before you start walking      · Keep items you use often on shelves within reach  Do not use a step stool to help you reach an item  · Paint or place reflective tape on the edges of your stairs  This will help you see the stairs better  Follow up with your healthcare provider as directed:  Write down your questions so you remember to ask them during your visits  © 2017 [address] Information is for End User's use only and may not be sold, redistributed or otherwise used for commercial purposes  All illustrations and images included in CareNotes® are the copyrighted property of A D A M , Inc  or Ervin Aragon  The above information is an  only  It is not intended as medical advice for individual conditions or treatments  Talk to your doctor, nurse or pharmacist before following any medical regimen to see if it is safe and effective for you  Advance Directives   WHAT YOU NEED TO KNOW:   What are advance directives? Advance directives are legal documents that state your wishes and plans for medical care  These plans are made ahead of time in case you lose your ability to make decisions for yourself  Advance directives can apply to any medical decision, such as the treatments you want, and if you want to donate organs  What are the types of advance directives? There are many types of advance directives, and each state has rules about how to use them  You may choose a combination of any of the following:  · Living will: This is a written record of the treatment you want  You can also choose which treatments you do not want, which to limit, and which to stop at a certain time  This includes surgery, medicine, IV fluid, and tube feedings  · Durable power of  for healthcare [facility]): This is a written record that states who you want to make healthcare choices for you when you are unable to make them for yourself  This person, called a proxy, is usually a family member or a friend   You may choose more than 1 proxy  · Do not resuscitate (DNR) order:  A DNR order is used in case your heart stops beating or you stop breathing  It is a request not to have certain forms of treatment, such as CPR  A DNR order may be included in other types of advance directives  · Medical directive: This covers the care that you want if you are in a coma, near death, or unable to make decisions for yourself  You can list the treatments you want for each condition  Treatment may include pain medicine, surgery, blood transfusions, dialysis, IV or tube feedings, and a ventilator (breathing machine)  · Values history: This document has questions about your views, beliefs, and how you feel and think about life  This information can help others choose the care that you would choose  Why are advance directives important? An advance directive helps you control your care  Although spoken wishes may be used, it is better to have your wishes written down  Spoken wishes can be misunderstood, or not followed  Treatments may be given even if you do not want them  An advance directive may make it easier for your family to make difficult choices about your care  How do I decide what to put in my advance directives? · Make informed decisions:  Make sure you fully understand treatments or care you may receive  Think about the benefits and problems your decisions could cause for you or your family  Talk to healthcare providers if you have concerns or questions before you write down your wishes  You may also want to talk with your [employer] or , or a   Check your state laws to make sure that what you put in your advance directive is legal      · Sign all forms:  Sign and date your advance directive when you have finished  You may also need 2 witnesses to sign the forms  Witnesses cannot be your doctor or his staff, your spouse, heirs or beneficiaries, people you owe money to, or your chosen proxy   Talk to your family, proxy, and healthcare providers about your advance directive  Give each person a copy, and keep one for yourself in a place you can get to easily  Do not keep it hidden or locked away  · Review and revise your plans: You can revise your advance directive at any time, as long as you are able to make decisions  Review your plan every year, and when there are changes in your life, or your health  When you make changes, let your family, proxy, and healthcare providers know  Give each a new copy  Where can I find more information? · [employer]  [address] [place] , [place]  Phone: 0- [phone] - [phone]  Phone: 3- [phone] - [phone]  Web Address: http://www  aafp org  · [address] [facility])  [address] [address] [facility], [address]  Phone: [phone]  Phone: [phone]  Web Address: Justina lopez  CARE AGREEMENT:   You have the right to help plan your care  To help with this plan, you must learn about your health condition and treatment options  You must also learn about advance directives and how they are used  Work with your healthcare providers to decide what care will be used to treat you  You always have the right to refuse treatment  The above information is an  only  It is not intended as medical advice for individual conditions or treatments  Talk to your doctor, nurse or pharmacist before following any medical regimen to see if it is safe and effective for you  © 2017 [address]  Information is for End User's use only and may not be sold, redistributed or otherwise used for commercial purposes  All illustrations and images included in CareNotes® are the copyrighted property of A D A M , Inc  or Ervin Aragon

## 2019-01-23 NOTE — ASSESSMENT & PLAN NOTE
Wt Readings from Last 3 Encounters:   01/23/19 67 9 kg (149 lb 9 6 oz)   10/18/18 67 2 kg (148 lb 3 2 oz)   09/14/18 65 1 kg (143 lb 8 oz)     BMI Counseling: Body mass index is 30 22 kg/m²  Discussed the patient's BMI with her  The BMI is above average  BMI counseling and education was provided to the patient  Nutrition recommendations include reducing portion sizes, decreasing overall calorie intake, 3-5 servings of fruits/vegetables daily, consuming healthier snacks, decreasing soda and/or juice intake, moderation in carbohydrate intake and reducing intake of cholesterol  Exercise recommendations include moderate aerobic physical activity for 150 minutes/week and exercising 3-5 times per week

## 2019-01-23 NOTE — ASSESSMENT & PLAN NOTE
Still having pain, not interested in work up for referral at this time, will let me know when the pain is unbearable

## 2019-01-23 NOTE — ASSESSMENT & PLAN NOTE
Lab Results   Component Value Date    [id number] 5 960 (H) 10/27/2017    TSH 4 440 01/23/2018     Abnormal in 2017, but then normal, so will recheck

## 2019-01-23 NOTE — ASSESSMENT & PLAN NOTE
Last Lipid Panel:  Lab Results   Component Value Date    CHOLESTEROL 238 (H) 01/23/2018    HDL 56 01/23/2018    LDLC 149 (H) 01/23/2018    TRIG 165 (H) 01/23/2018     The 10-year ASCVD risk score (Jamaal Woody et al , 2013) is: 15 8%    Values used to calculate the score:      Age: 72 years      Sex: Female      Is Non- : No      Diabetic: No      Tobacco smoker: No      Systolic Blood Pressure: 353 mmHg      Is BP treated: Yes      HDL Cholesterol: 56 mg/dL      Total Cholesterol: 238 mg/dL    Currently only Rosuvastatin 2 5mg every other day  Continue ASA 81mg  Recheck FLP

## 2019-01-23 NOTE — ASSESSMENT & PLAN NOTE
BP Readings from Last 3 Encounters:   01/23/19 134/78   10/18/18 150/80   08/27/18 132/72       Results from last 6 Months  Lab Units 08/27/18  1312   BUN mg/dL 16   CREATININE mg/dL 1 00     Controlled on current regimen:  -Lisinopril 10mg  -Recheck BMP

## 2019-01-23 NOTE — PROGRESS NOTES
Assessment and Plan:    Problem List Items Addressed This Visit        Cardiovascular and Mediastinum    Essential hypertension     BP Readings from Last 3 Encounters:   01/23/19 134/78   10/18/18 150/80   08/27/18 132/72       Results from last 6 Months  Lab Units 08/27/18  1312   BUN mg/dL 16   CREATININE mg/dL 1 00     Controlled on current regimen:  -Lisinopril 10mg  -Recheck BMP         Relevant Medications    lisinopril (ZESTRIL) 10 mg tablet    Other Relevant Orders    Basic metabolic panel       Musculoskeletal and Integument    Borderline osteopenia     Last DEXA showed osteopenia in 2014, and has lost 1 inch in height    I recommend that you take either Ergocalciferol (Vit D) 800-1000 units daily with Calcium carbonate 1200mg daily or Calcium citrate 1200mg daily           Relevant Orders    DXA bone density spine hip and pelvis    Localized osteoarthritis of right hand     Still having pain, not interested in work up for referral at this time, will let me know when the pain is unbearable            Other    Mixed hyperlipidemia - Primary     Last Lipid Panel:  Lab Results   Component Value Date    CHOLESTEROL 238 (H) 01/23/2018    HDL 56 01/23/2018    LDLC 149 (H) 01/23/2018    TRIG 165 (H) 01/23/2018     The 10-year ASCVD risk score (Danielle Valdez et al , 2013) is: 15 8%    Values used to calculate the score:      Age: 72 years      Sex: Female      Is Non- : No      Diabetic: No      Tobacco smoker: No      Systolic Blood Pressure: 004 mmHg      Is BP treated: Yes      HDL Cholesterol: 56 mg/dL      Total Cholesterol: 238 mg/dL    Currently only Rosuvastatin 2 5mg every other day  Continue ASA 81mg  Recheck FLP         Relevant Orders    Lipid Panel with Direct LDL reflex    Abnormal TSH     Lab Results   Component Value Date    [id number] 5 960 (H) 10/27/2017    TSH 4 440 01/23/2018     Abnormal in 2017, but then normal, so will recheck         Mild vitamin D deficiency Currently on Vit D 1400 U, recheck Vit D level         Relevant Orders    Vitamin D 25 hydroxy    Iron deficiency anemia secondary to inadequate dietary iron intake     Is currently on FeSO4 325mg as a preventive measure  Recheck CBC         Relevant Orders    CBC and differential      Other Visit Diagnoses     Encounter for hepatitis C screening test for low risk patient        Relevant Orders    Hepatitis C antibody    Loss of height        Relevant Orders    DXA bone density spine hip and pelvis    Medicare annual wellness visit, subsequent        Screening for cardiovascular condition        Screening for malignant neoplasm of breast        Relevant Orders    Mammo screening bilateral w cad        Health Maintenance Due   Topic Date Due    Hepatitis C Screening  1945   Elda Mcarthur Medicare Annual Wellness Visit (AWV)  1945    DTaP,Tdap,and Td Vaccines (1 - Tdap) 2014    MAMMOGRAM  02/15/2019     HPI:  Srikanth Syed is a 72 y o  female here for her Subsequent Wellness Visit      Patient Active Problem List   Diagnosis    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 ([address] )    Essential hypertension    Mixed hyperlipidemia    Abnormal TSH    Mild vitamin D deficiency    Borderline osteopenia    Localized osteoarthritis of right hand    Iron deficiency anemia secondary to inadequate dietary iron intake     Past Medical History:   Diagnosis Date    Diverticulosis     Hyperlipidemia     Hypertension     Osteoarthritis     Osteopenia      Past Surgical History:   Procedure Laterality Date    INDUCED       x2    KNEE ARTHROSCOPY      therapeutic     NEUROPLASTY / TRANSPOSITION MEDIAN NERVE AT CARPAL TUNNEL       Family History   Problem Relation Age of Onset    Stroke Mother         syndrome    Hypertension Mother     Hypothyroidism Mother     Osteoarthritis Mother     Stroke Father         syndrome     Diabetes Father     Hypertension Father     Osteoarthritis Father     Diabetes Brother     Melanoma Sister     Squamous cell carcinoma Sister     Hypothyroidism Sister      History   Smoking Status    Former Smoker   Smokeless Tobacco    Never Used     History   Alcohol Use    Yes     Comment: social      History   Drug Use No       Current Outpatient Prescriptions   Medication Sig Dispense Refill    acetaminophen (TYLENOL) 500 mg tablet Take 2 tablets (1,000 mg total) by mouth 3 (three) times a day as needed for mild pain 30 tablet 0    ascorbic acid (VITAMIN C) 250 mg tablet Take 250 mg by mouth daily        aspirin 81 MG tablet Take 81 mg by mouth daily        Biotin 10 MG CAPS Take 1 capsule by mouth daily        Calcium Carbonate-Vitamin D (CALCIUM 600+D HIGH POTENCY) 600-400 MG-UNIT per tablet Take 1 tablet by mouth daily        cholecalciferol (VITAMIN D3) 1,000 units tablet Take 1,000 Units by mouth daily      coenzyme Q-10 100 MG capsule Take 100 mg by mouth daily        ferrous sulfate 325 (65 Fe) mg tablet Take 325 mg by mouth daily with breakfast        glucosamine-chondroitin 500-400 MG tablet Take 1 tablet by mouth 2 (two) times a day      lisinopril (ZESTRIL) 10 mg tablet Take 1 tablet (10 mg total) by mouth daily 90 tablet 3    Magnesium Citrate 100 MG TABS Take 1 tablet by mouth daily        Omega-3 Fatty Acids ([employer] FISH OIL) 1000 MG CAPS Take by mouth 2 (two) times a day        rosuvastatin (CRESTOR) 10 MG tablet TAKE AS DIRECTED 2 DAYS EACH WEEK (Patient taking differently: take 1/2 tablet every third day) 24 tablet 1     No current facility-administered medications for this visit        Allergies   Allergen Reactions    Penicillins Rash    Sulfamethoxazole-Trimethoprim Rash     Reaction Date: [id number];      Immunization History   Administered Date(s) Administered    Influenza Split High Dose Preservative Free IM 12/19/2013, 12/22/2014, 12/23/2015, 01/19/2017, 01/18/2018    Influenza TIV (IM) 01/08/2013    Influenza, high dose seasonal 0 5 mL 10/18/2018    Pneumococcal Conjugate 13-Valent 12/23/2015    Pneumococcal Polysaccharide PPV23 12/19/2013    Td (adult), adsorbed 01/28/2014    Varicella 01/01/2010    Zoster 1945       Patient Care Team:  Allyson Villa MD as PCP - General (Family Medicine)  Colette Wilks (Dermatology)  Javi Pearce MD (Oncology)  Kirsty Acosta OD (Optometry)  Santos Gilmore MD (Colon and Rectal Surgery)  Pravin Soto DPM (Podiatry)  Dennis Peter MD (Orthopedic Surgery)    Medicare Screening Tests and Risk Assessments:  Karla Magana is here for her Subsequent Wellness visit  Last Medicare Wellness visit information reviewed, patient interviewed, no change since last AWV  Health Risk Assessment:  Patient rates overall health as good  Patient feels that their physical health rating is Slightly worse  Eyesight was rated as Slightly worse  Hearing was rated as Slightly worse  Patient feels that their emotional and mental health rating is Same  Pain experienced by patient in the last 7 days has been Some  Patient's pain rating has been 4/10  Patient states that she has experienced no weight loss or gain in last 6 months  Emotional/Mental Health:  Patient has been feeling nervous/anxious  PHQ-9 Depression Screening:    Frequency of the following problems over the past two weeks:      1  Little interest or pleasure in doing things: 0 - not at all      2  Feeling down, depressed, or hopeless: 0 - not at all  PHQ-2 Score: 0          Broken Bones/Falls: Fall Risk Assessment:    In the past year, patient has experienced: No history of falling in past year          Bladder/Bowel:  Patient has leaked urine accidently in the last six months  Patient reports no loss of bowel control  Immunizations:  Patient has had a flu vaccination within the last year  Patient has received a pneumonia shot  Patient has received a shingles shot  Patient has received tetanus/diphtheria shot  Home Safety:  Patient does not have trouble with stairs inside or outside of their home  Patient currently reports that there are no safety hazards present in home, working smoke alarms, working carbon monoxide detectors  Preventative Screenings:   Breast cancer screening performed, cholesterol screen completed, glaucoma eye exam completed,     Nutrition:  Current diet: Regular and Limited junk food with servings of the following:    Medications:  Patient is currently taking over-the-counter supplements  Patient is able to manage medications  Lifestyle Choices:  Patient reports no tobacco use  Patient has smoked or used tobacco in the past   Patient has stopped her tobacco use  Tobacco use quit date: 1960s  Patient reports alcohol use  Patient drives a vehicle  Patient wears seat belt  Activities of Daily Living:  Can get out of bed by his or her self, able to dress self, able to make own meals, able to do own shopping, able to bathe self, can do own laundry/housekeeping, can manage own money, pay bills and track expenses    Previous Hospitalizations:  No hospitalization or ED visit in past 12 months        Advanced Directives:  Patient has decided on a power of   Patient has spoken to designated power of   Patient has completed advanced directive          Preventative Screening/Counseling:      Cardiovascular:      General: Risks and Benefits Discussed and Screening Current      Counseling: Healthy Diet, Healthy Weight and Improve Cholesterol     Due for Labs/Analytes/Optional EKG: Lipid Panel          Diabetes:      General: Risks and Benefits Discussed and Screening Current      Counseling: Healthy Diet and Healthy Weight      Due for labs: Blood Glucose          Colorectal Cancer:      General: Risks and Benefits Discussed and Screening Current      Comments: RAJEEV- Dr Rosa Christopher, in 2018        Breast Cancer:      General: Risks and Benefits Discussed and Screening Current          Cervical Cancer:      General: Risks and Benefits Discussed and Screening Not Indicated          Osteoporosis:      General: Risks and Benefits Discussed and Screening Current      Counseling: Regular Weightbearing Exercise and Calcium and Vitamin D Intake          AAA:      General: Screening Not Indicated          Glaucoma:      General: Risks and Benefits Discussed and Screening Current      Comments: Dr Theresa Martin        HIV:      General: Screening Not Indicated          Hepatitis C:      General: Risks and Benefits Discussed      Counseling: has received general HCV counseling        Advanced Directives:   Patient has living will for healthcare, has durable POA for healthcare, patient has an advanced directive  Information on ACP and/or AD provided  Immunizations:  Patient reviewed and up to date      Influenza: Influenza UTD This Year and Risks & Benefits Discussed      Pneumococcal: Risks & Benefits Discussed and Pneumococcal Due Today      Shingrix: Shingrix Vaccine Needed Today and Risks & Benefits Discussed      TD: Td Vaccine UTD      Other Preventative Counseling (Non-Medicare):   Fall Prevention, Increase physical activity, Nutrition Counseling, Skin self-exam and Dietary education for weight gain

## 2019-01-23 NOTE — ASSESSMENT & PLAN NOTE
Last DEXA showed osteopenia in 2014, and has lost 1 inch in height    I recommend that you take either Ergocalciferol (Vit D) 800-1000 units daily with Calcium carbonate 1200mg daily or Calcium citrate 1200mg daily

## 2019-01-29 LAB
25(OH)D3+25(OH)D2 SERPL-MCNC: 44.9 NG/ML (ref 30–100)
BASOPHILS # BLD AUTO: 0.1 X10E3/UL (ref 0–0.2)
BASOPHILS NFR BLD AUTO: 1 %
BUN SERPL-MCNC: 22 MG/DL (ref 8–27)
BUN/CREAT SERPL: 22 (ref 12–28)
CALCIUM SERPL-MCNC: 9.4 MG/DL (ref 8.7–10.3)
CHLORIDE SERPL-SCNC: 98 MMOL/L (ref 96–106)
CHOLEST SERPL-MCNC: 217 MG/DL (ref 100–199)
CO2 SERPL-SCNC: 24 MMOL/L (ref 20–29)
CREAT SERPL-MCNC: 0.98 MG/DL (ref 0.57–1)
EOSINOPHIL # BLD AUTO: 0.2 X10E3/UL (ref 0–0.4)
EOSINOPHIL NFR BLD AUTO: 3 %
ERYTHROCYTE [DISTWIDTH] IN BLOOD BY AUTOMATED COUNT: 13.9 % (ref 12.3–15.4)
GLUCOSE SERPL-MCNC: 91 MG/DL (ref 65–99)
HCT VFR BLD AUTO: 42.9 % (ref 34–46.6)
HCV AB S/CO SERPL IA: 0.1 S/CO RATIO (ref 0–0.9)
HDLC SERPL-MCNC: 58 MG/DL
HGB BLD-MCNC: 14.5 G/DL (ref 11.1–15.9)
IMM GRANULOCYTES # BLD: 0 X10E3/UL (ref 0–0.1)
IMM GRANULOCYTES NFR BLD: 0 %
LABCORP COMMENT: NORMAL
LDLC SERPL CALC-MCNC: 139 MG/DL (ref 0–99)
LYMPHOCYTES # BLD AUTO: 3 X10E3/UL (ref 0.7–3.1)
LYMPHOCYTES NFR BLD AUTO: 43 %
MCH RBC QN AUTO: 30.6 PG (ref 26.6–33)
MCHC RBC AUTO-ENTMCNC: 33.8 G/DL (ref 31.5–35.7)
MCV RBC AUTO: 91 FL (ref 79–97)
MONOCYTES # BLD AUTO: 0.4 X10E3/UL (ref 0.1–0.9)
MONOCYTES NFR BLD AUTO: 5 %
NEUTROPHILS # BLD AUTO: 3.5 X10E3/UL (ref 1.4–7)
NEUTROPHILS NFR BLD AUTO: 48 %
PLATELET # BLD AUTO: 280 X10E3/UL (ref 150–379)
POTASSIUM SERPL-SCNC: 4.1 MMOL/L (ref 3.5–5.2)
RBC # BLD AUTO: 4.74 X10E6/UL (ref 3.77–5.28)
SL AMB EGFR AFRICAN AMERICAN: 67 ML/MIN/1.73
SL AMB EGFR NON AFRICAN AMERICAN: 58 ML/MIN/1.73
SL AMB INTERPRETATION: NORMAL
SODIUM SERPL-SCNC: 137 MMOL/L (ref 134–144)
TRIGL SERPL-MCNC: 99 MG/DL (ref 0–149)
TSH SERPL DL<=0.005 MIU/L-ACNC: 3.28 UIU/ML (ref 0.45–4.5)
WBC # BLD AUTO: 7.1 X10E3/UL (ref 3.4–10.8)

## 2019-01-30 ENCOUNTER — TELEPHONE (OUTPATIENT)
Dept: FAMILY MEDICINE CLINIC | Facility: CLINIC | Age: 74
End: 2019-01-30

## 2019-01-30 DIAGNOSIS — E78.01 FAMILIAL HYPERCHOLESTEROLEMIA: ICD-10-CM

## 2019-01-30 RX ORDER — ROSUVASTATIN CALCIUM 20 MG/1
20 TABLET, COATED ORAL DAILY
Qty: 90 TABLET | Refills: 1 | Status: SHIPPED | OUTPATIENT
Start: 2019-01-30 | End: 2019-05-11 | Stop reason: SDUPTHER

## 2019-01-30 NOTE — TELEPHONE ENCOUNTER
Please call Armin Cortez and let her know that her labs showed: her cholesterol is high, she should increase her rosuvastatin, we need to increase the dose to 20mg daily, she can take 2 tabs daily of what she has and then I'll send in the new medication  Her electrolytes, kidney function, glucose, thyroid function and blood counts  are all in the normal range! Hep C was negative    Thank you!     Orders Only on 01/24/2019   Component Date Value Ref Range Status    White Blood Cell Count 01/24/2019 7 1  3 4 - 10 8 x10E3/uL Final    Red Blood Cell Count 01/24/2019 4 74  3 77 - 5 28 x10E6/uL Final    Hemoglobin 01/24/2019 14 5  11 1 - 15 9 g/dL Final    HCT 01/24/2019 42 9  34 0 - 46 6 % Final    MCV 01/24/2019 91  79 - 97 fL Final    MCH 01/24/2019 30 6  26 6 - 33 0 pg Final    MCHC 01/24/2019 33 8  31 5 - 35 7 g/dL Final    RDW 01/24/2019 13 9  12 3 - 15 4 % Final    Platelet Count 89/22/8326 280  150 - 379 x10E3/uL Final    Neutrophils 01/24/2019 48  Not Estab  % Final    Lymphocytes 01/24/2019 43  Not Estab  % Final    Monocytes 01/24/2019 5  Not Estab  % Final    Eosinophils 01/24/2019 3  Not Estab  % Final    Basophils PCT 01/24/2019 1  Not Estab  % Final    Neutrophils (Absolute) 01/24/2019 3 5  1 4 - 7 0 x10E3/uL Final    Lymphocytes (Absolute) 01/24/2019 3 0  0 7 - 3 1 x10E3/uL Final    Monocytes (Absolute) 01/24/2019 0 4  0 1 - 0 9 x10E3/uL Final    Eosinophils (Absolute) 01/24/2019 0 2  0 0 - 0 4 x10E3/uL Final    Basophils ABS 01/24/2019 0 1  0 0 - 0 2 x10E3/uL Final    Immature Granulocytes 01/24/2019 0  Not Estab  % Final    Immature Granulocytes (Absolute) 01/24/2019 0 0  0 0 - 0 1 x10E3/uL Final    Glucose, Random 01/24/2019 91  65 - 99 mg/dL Final    BUN 01/24/2019 22  8 - 27 mg/dL Final    Creatinine 01/24/2019 0 98  0 57 - 1 00 mg/dL Final    eGFR Non  01/24/2019 58* >59 mL/min/1 73 Final    SL AMB EGFR  01/24/2019 67  >59 mL/min/1 73 Final  SL AMB BUN/CREATININE RATIO 01/24/2019 22  12 - 28 Final    Sodium 01/24/2019 137  134 - 144 mmol/L Final    Potassium 01/24/2019 4 1  3 5 - 5 2 mmol/L Final    Chloride 01/24/2019 98  96 - 106 mmol/L Final    CO2 01/24/2019 24  20 - 29 mmol/L Final    CALCIUM 01/24/2019 9 4  8 7 - 10 3 mg/dL Final    Cholesterol, Total 01/24/2019 217* 100 - 199 mg/dL Final    Triglycerides 01/24/2019 99  0 - 149 mg/dL Final    HDL 01/24/2019 58  >39 mg/dL Final    LDL Direct 01/24/2019 139* 0 - 99 mg/dL Final    25-HYDROXY VIT D 01/24/2019 44 9  30 0 - 100 0 ng/mL Final    Comment: Vitamin D deficiency has been defined by the [address] practice guideline as a  level of serum 25-OH vitamin D less than 20 ng/mL (1,2)  The Endocrine Society went on to further define vitamin D  insufficiency as a level between 21 and 29 ng/mL (2)  1  IOM ([place] of [employer])  2010  Dietary reference     intakes for calcium and D  [address] [facility]: The     [employer]  2  Randy MF, Ana María NC, Daisy MONK, et al      Evaluation, treatment, and prevention of vitamin D     deficiency: an Endocrine Society clinical practice     guideline  JCEM  2011 Jul; 96(7):1911-30   HEP C AB 01/24/2019 0 1  0 0 - 0 9 s/co ratio Final    Interpretation: 01/24/2019 Comment   Final    Comment: Negative  Not infected with HCV, unless recent infection is suspected or other  evidence exists to indicate HCV infection   TSH 01/24/2019 3 280  0 450 - 4 500 uIU/mL Final    Comment 01/24/2019 Comment   Final    Comment: Catr[place] Lover BMP8 Default  Ambig Abbrev BMP8 Default  A hand-written panel/profile was received from your office  In  accordance with the [facility] Ambiguous Test Code Policy dated July 3928, we have completed your order by using the closest currently  or formerly recognized AMA panel    We have assigned Basic Metabolic  Panel (8), Test Code #474673 to this request  If this is not the  testing you wished to receive on this specimen, please contact the  [address] [place] [address] Client Inquiry/Technical Services Department to clarify the  test order  We appreciate your business

## 2019-02-12 ENCOUNTER — OFFICE VISIT (OUTPATIENT)
Dept: FAMILY MEDICINE CLINIC | Facility: CLINIC | Age: 74
End: 2019-02-12
Payer: COMMERCIAL

## 2019-02-12 VITALS
SYSTOLIC BLOOD PRESSURE: 138 MMHG | HEART RATE: 64 BPM | TEMPERATURE: 97.8 F | DIASTOLIC BLOOD PRESSURE: 70 MMHG | HEIGHT: 59 IN | RESPIRATION RATE: 16 BRPM | WEIGHT: 153.8 LBS | BODY MASS INDEX: 31 KG/M2

## 2019-02-12 DIAGNOSIS — Z23 IMMUNIZATION DUE: ICD-10-CM

## 2019-02-12 DIAGNOSIS — J20.9 ACUTE BRONCHITIS, UNSPECIFIED ORGANISM: Primary | ICD-10-CM

## 2019-02-12 DIAGNOSIS — J06.9 UPPER RESPIRATORY TRACT INFECTION, UNSPECIFIED TYPE: ICD-10-CM

## 2019-02-12 PROCEDURE — 1160F RVW MEDS BY RX/DR IN RCRD: CPT | Performed by: NURSE PRACTITIONER

## 2019-02-12 PROCEDURE — 99214 OFFICE O/P EST MOD 30 MIN: CPT | Performed by: NURSE PRACTITIONER

## 2019-02-12 PROCEDURE — 3008F BODY MASS INDEX DOCD: CPT | Performed by: NURSE PRACTITIONER

## 2019-02-12 RX ORDER — AZITHROMYCIN 250 MG/1
TABLET, FILM COATED ORAL
Qty: 6 TABLET | Refills: 0 | Status: SHIPPED | OUTPATIENT
Start: 2019-02-12 | End: 2019-02-17

## 2019-02-12 NOTE — PROGRESS NOTES
Assessment/Plan:         Problem List Items Addressed This Visit     None      Visit Diagnoses     Acute bronchitis + URI  --Advise trial of rest, fluids, OTC expectorant, saline nasal spray, cool mist humidifier, Tylenol  --Fill and start antibiotic only if no improvement/worsening symptoms next 2-3 days  Relevant Medications    azithromycin (ZITHROMAX) 250 mg tablet    Immunization due        Relevant Medications    Zoster Vac Recomb Adjuvanted (SHINGRIX) 50 MCG/0 5ML SUSR            Subjective:      Patient ID: Mercedes Quintero is a 72 y o  female  Here with complaints of cough productive yellow sputum, nasal congestion, mild sore throat x 1 week  No headache, myalgias  No fever/chills  No ear pain  Appetite OK  No N/V/D, abdominal pain  Some chest tightness, wheezing  No improvement  Taking Dayquil and Nyquil which don't seem to be helping much  No known sick contacts  Non-smoker  No underlying asthma, breathing issues  Had flu shot  Had Zostavax a number of  years ago  Requesting written Rx for Shingrix  The following portions of the patient's history were reviewed and updated as appropriate: current medications, past family history, past medical history, past social history, past surgical history and problem list     Review of Systems   Constitutional: Negative for fever  HENT: Positive for rhinorrhea and sore throat  Respiratory: Positive for cough  Negative for wheezing  Cardiovascular: Negative for chest pain  Gastrointestinal: Negative for abdominal pain, diarrhea, nausea and vomiting  Musculoskeletal: Negative for myalgias  Neurological: Negative for headaches           Objective:      /70 (BP Location: Left arm, Patient Position: Sitting, Cuff Size: Standard)   Pulse 64   Temp 97 8 °F (36 6 °C)   Resp 16   Ht 4' 11" (1 499 m)   Wt 69 8 kg (153 lb 12 8 oz)   BMI 31 06 kg/m²          Physical Exam   Constitutional: She is oriented to person, place, and time  She appears well-developed and well-nourished  HENT:   Head: Normocephalic  Right Ear: External ear normal    Left Ear: External ear normal    Mouth/Throat: Oropharynx is clear and moist  No oropharyngeal exudate  Turbinates swollen, erythematous  No sinus tenderness  Eyes: Pupils are equal, round, and reactive to light  Conjunctivae are normal    Neck: Normal range of motion  Neck supple  Cardiovascular: Normal rate, regular rhythm and normal heart sounds  Pulmonary/Chest: Effort normal and breath sounds normal    Breathing non-labored  RR=18  Occasional cough  Abdominal: Soft  Bowel sounds are normal  There is no tenderness  Lymphadenopathy:     She has no cervical adenopathy  Neurological: She is alert and oriented to person, place, and time  She has normal reflexes  Skin: Skin is warm and dry  Psychiatric: She has a normal mood and affect

## 2019-02-12 NOTE — PATIENT INSTRUCTIONS
Upper Respiratory Infection, Ambulatory Care   GENERAL INFORMATION:   An upper respiratory infection  is also called a common cold  It can affect your nose, throat, ears, and sinuses  Common symptoms include the following:   · Runny or stuffy nose    · Sneezing and coughing    · Sore throat or hoarseness    · Red, watery, and sore eyes    · Tiredness or restlessness    · Chills and fever    · Headache, body aches, or sore muscles  Seek immediate care for the following symptoms:   · Headaches or a stiff neck    · Bright lights hurt your eyes    · Chest pain or trouble breathing  Treatment for an upper respiratory infection  may include any of the following:  · Decongestants  help decrease nasal congestion and improve your breathing  Do not use decongestant sprays for more than a few days  · Cough suppressants  help decrease coughing  Ask your healthcare provider which type of cough medicine is best for you  Some cough medicines need a doctor's order  · NSAIDs  help decrease swelling and pain or fever  This medicine is available with or without a doctor's order  NSAIDs can cause stomach bleeding or kidney problems in certain people  If you take blood thinner medicine, always ask your healthcare provider if NSAIDs are safe for you  Always read the medicine label and follow directions  Care for an upper respiratory infection:   · Rest  until your fever is gone or you feel better  · Drink liquids as directed to prevent dehydration  You may need to drink 8 to 10 cups of liquid each day  Good liquids to drink include water, ginger ale, tea, or fruit juices  · Gargle  with warm salt water to help your sore throat feel better  Mix ¼ teaspoon salt with 1 cup warm water  You may also suck on hard candy or throat lozenges  · Saline nasal drops  help loosen your nasal congestion  They can be bought without a doctor's order      · Take a warm bath or shower  to help decrease body aches and help you breathe easier  · Use a cool-mist humidifier  to increase air moisture and make it easier for you to breathe  Prevent the spread of germs:   · Avoid others for the first 2 to 3 days of your cold  Germs are easily spread during this time  · Do not share food, drinks,  towels, or personal items with others  · Wash your hands often  Use soap and water  Wash your hands after you use the bathroom, change a child's diapers, or sneeze  Wash your hands before you prepare or eat food  Cover your mouth and nose with a tissue when you sneeze or cough  Follow up with your healthcare provider as directed:  Write down your questions so you remember to ask them during your visits  CARE AGREEMENT:   You have the right to help plan your care  Learn about your health condition and how it may be treated  Discuss treatment options with your caregivers to decide what care you want to receive  You always have the right to refuse treatment  The above information is an  only  It is not intended as medical advice for individual conditions or treatments  Talk to your doctor, nurse or pharmacist before following any medical regimen to see if it is safe and effective for you  © 2014 [address] is for End User's use only and may not be sold, redistributed or otherwise used for commercial purposes  All illustrations and images included in CareNotes® are the copyrighted property of [employer]  or Ervin Aragon

## 2019-02-15 ENCOUNTER — TELEPHONE (OUTPATIENT)
Dept: FAMILY MEDICINE CLINIC | Facility: CLINIC | Age: 74
End: 2019-02-15

## 2019-02-15 DIAGNOSIS — J06.9 ACUTE URI: Primary | ICD-10-CM

## 2019-02-15 DIAGNOSIS — J06.9 ACUTE URI: ICD-10-CM

## 2019-02-15 RX ORDER — BENZONATATE 100 MG/1
100 CAPSULE ORAL 3 TIMES DAILY PRN
Qty: 30 CAPSULE | Refills: 0 | Status: SHIPPED | OUTPATIENT
Start: 2019-02-15 | End: 2019-02-25

## 2019-02-15 RX ORDER — BENZONATATE 100 MG/1
100 CAPSULE ORAL 3 TIMES DAILY PRN
Qty: 30 CAPSULE | Refills: 0 | Status: SHIPPED | OUTPATIENT
Start: 2019-02-15 | End: 2019-02-15 | Stop reason: SDUPTHER

## 2019-02-15 NOTE — TELEPHONE ENCOUNTER
Patient called with an update  She stated she is coughing a lot at night  Patient would like to know if a cough medicine can be sent in for her to help her sleep  Please advise  Patient would like a return call   [phone]

## 2019-02-15 NOTE — TELEPHONE ENCOUNTER
Pt was prescribed tessalon perles today that were sent to [facility]  They should have gone to [facility] in [place]  I called and cancelled the med at [facility] so can we please resend to [facility]?

## 2019-03-08 ENCOUNTER — HOSPITAL ENCOUNTER (OUTPATIENT)
Dept: RADIOLOGY | Age: 74
Discharge: HOME | End: 2019-03-08
Payer: COMMERCIAL

## 2019-03-08 VITALS — BODY MASS INDEX: 29.84 KG/M2 | HEIGHT: 59 IN | WEIGHT: 148 LBS

## 2019-03-08 DIAGNOSIS — M85.80 BORDERLINE OSTEOPENIA: ICD-10-CM

## 2019-03-08 DIAGNOSIS — R29.890 LOSS OF HEIGHT: ICD-10-CM

## 2019-03-08 DIAGNOSIS — Z12.39 SCREENING FOR MALIGNANT NEOPLASM OF BREAST: ICD-10-CM

## 2019-03-08 PROCEDURE — 77067 SCR MAMMO BI INCL CAD: CPT

## 2019-03-08 PROCEDURE — 77080 DXA BONE DENSITY AXIAL: CPT

## 2019-05-11 DIAGNOSIS — E78.01 FAMILIAL HYPERCHOLESTEROLEMIA: ICD-10-CM

## 2019-05-11 RX ORDER — ROSUVASTATIN CALCIUM 20 MG/1
TABLET, COATED ORAL
Qty: 90 TABLET | Refills: 1 | Status: SHIPPED | OUTPATIENT
Start: 2019-05-11 | End: 2019-12-05 | Stop reason: SDUPTHER

## 2019-08-02 ENCOUNTER — TELEPHONE (OUTPATIENT)
Dept: FAMILY MEDICINE CLINIC | Facility: CLINIC | Age: 74
End: 2019-08-02

## 2019-08-02 NOTE — TELEPHONE ENCOUNTER
Patient called  She is having abdominal pain and water diarrhea x several days  She has a history of Colitis from 08/2018 which she saw Dr Kanu Reyna for   Please advise

## 2019-08-02 NOTE — TELEPHONE ENCOUNTER
It looks like she has an appt with them next week, so I would defer to them for mgmt   In the past it looks like she has had viral colitis

## 2019-08-02 NOTE — TELEPHONE ENCOUNTER
Patient called back stating she called Dr Yoshi Pardo and they scheduled her for 08- but they told her she should get a stool sample done at our office to get things going  Please advise  Patient would like a return call

## 2019-08-08 PROBLEM — K59.1 FUNCTIONAL DIARRHEA: Status: ACTIVE | Noted: 2019-08-08

## 2019-08-09 ENCOUNTER — APPOINTMENT (OUTPATIENT)
Dept: LAB | Facility: CLINIC | Age: 74
End: 2019-08-09
Payer: COMMERCIAL

## 2019-08-09 DIAGNOSIS — K59.1 FUNCTIONAL DIARRHEA: ICD-10-CM

## 2019-08-09 PROCEDURE — 87505 NFCT AGENT DETECTION GI: CPT

## 2019-08-09 PROCEDURE — 86255 FLUORESCENT ANTIBODY SCREEN: CPT

## 2019-08-09 PROCEDURE — 87493 C DIFF AMPLIFIED PROBE: CPT

## 2019-08-09 PROCEDURE — 36415 COLL VENOUS BLD VENIPUNCTURE: CPT

## 2019-08-09 PROCEDURE — 82784 ASSAY IGA/IGD/IGG/IGM EACH: CPT

## 2019-08-09 PROCEDURE — 83516 IMMUNOASSAY NONANTIBODY: CPT

## 2019-08-10 LAB
C DIFF TOX GENS STL QL NAA+PROBE: NORMAL
CAMPYLOBACTER DNA SPEC NAA+PROBE: NORMAL
SALMONELLA DNA SPEC QL NAA+PROBE: NORMAL
SHIGA TOXIN STX GENE SPEC NAA+PROBE: NORMAL
SHIGELLA DNA SPEC QL NAA+PROBE: NORMAL

## 2019-08-11 LAB
ENDOMYSIUM IGA SER QL: NEGATIVE
GLIADIN PEPTIDE IGA SER-ACNC: 2 UNITS (ref 0–19)
GLIADIN PEPTIDE IGG SER-ACNC: 9 UNITS (ref 0–19)
IGA SERPL-MCNC: 215 MG/DL (ref 64–422)
TTG IGA SER-ACNC: <2 U/ML (ref 0–3)
TTG IGG SER-ACNC: <2 U/ML (ref 0–5)

## 2019-11-28 DIAGNOSIS — I10 ESSENTIAL HYPERTENSION: ICD-10-CM

## 2019-11-29 RX ORDER — LISINOPRIL 10 MG/1
TABLET ORAL
Qty: 90 TABLET | Refills: 3 | Status: SHIPPED | OUTPATIENT
Start: 2019-11-29 | End: 2020-10-30

## 2019-12-05 DIAGNOSIS — E78.01 FAMILIAL HYPERCHOLESTEROLEMIA: ICD-10-CM

## 2019-12-06 RX ORDER — ROSUVASTATIN CALCIUM 20 MG/1
TABLET, COATED ORAL
Qty: 90 TABLET | Refills: 1 | Status: SHIPPED | OUTPATIENT
Start: 2019-12-06 | End: 2020-05-26

## 2020-01-28 ENCOUNTER — OFFICE VISIT (OUTPATIENT)
Dept: FAMILY MEDICINE CLINIC | Facility: CLINIC | Age: 75
End: 2020-01-28
Payer: COMMERCIAL

## 2020-01-28 VITALS
BODY MASS INDEX: 30.95 KG/M2 | HEIGHT: 59 IN | TEMPERATURE: 97.5 F | WEIGHT: 153.5 LBS | SYSTOLIC BLOOD PRESSURE: 126 MMHG | DIASTOLIC BLOOD PRESSURE: 68 MMHG

## 2020-01-28 DIAGNOSIS — D50.8 IRON DEFICIENCY ANEMIA SECONDARY TO INADEQUATE DIETARY IRON INTAKE: ICD-10-CM

## 2020-01-28 DIAGNOSIS — E78.2 MIXED HYPERLIPIDEMIA: ICD-10-CM

## 2020-01-28 DIAGNOSIS — Z23 NEED FOR VACCINATION: ICD-10-CM

## 2020-01-28 DIAGNOSIS — E03.8 SUBCLINICAL HYPOTHYROIDISM: ICD-10-CM

## 2020-01-28 DIAGNOSIS — D03.72 MELANOMA IN SITU OF LEFT LOWER EXTREMITY (HCC): ICD-10-CM

## 2020-01-28 DIAGNOSIS — Z12.39 SCREENING FOR BREAST CANCER: ICD-10-CM

## 2020-01-28 DIAGNOSIS — D03.72: ICD-10-CM

## 2020-01-28 DIAGNOSIS — Z00.00 MEDICARE ANNUAL WELLNESS VISIT, SUBSEQUENT: ICD-10-CM

## 2020-01-28 DIAGNOSIS — M85.89 OSTEOPENIA OF MULTIPLE SITES: ICD-10-CM

## 2020-01-28 DIAGNOSIS — I10 ESSENTIAL HYPERTENSION: Primary | ICD-10-CM

## 2020-01-28 DIAGNOSIS — E55.9 MILD VITAMIN D DEFICIENCY: ICD-10-CM

## 2020-01-28 PROBLEM — K59.1 FUNCTIONAL DIARRHEA: Status: RESOLVED | Noted: 2019-08-08 | Resolved: 2020-01-28

## 2020-01-28 PROCEDURE — 1125F AMNT PAIN NOTED PAIN PRSNT: CPT

## 2020-01-28 PROCEDURE — G0439 PPPS, SUBSEQ VISIT: HCPCS | Performed by: FAMILY MEDICINE

## 2020-01-28 PROCEDURE — 1160F RVW MEDS BY RX/DR IN RCRD: CPT

## 2020-01-28 PROCEDURE — 3078F DIAST BP <80 MM HG: CPT | Performed by: FAMILY MEDICINE

## 2020-01-28 PROCEDURE — G0008 ADMIN INFLUENZA VIRUS VAC: HCPCS

## 2020-01-28 PROCEDURE — 3074F SYST BP LT 130 MM HG: CPT | Performed by: FAMILY MEDICINE

## 2020-01-28 PROCEDURE — 1036F TOBACCO NON-USER: CPT | Performed by: FAMILY MEDICINE

## 2020-01-28 PROCEDURE — 3008F BODY MASS INDEX DOCD: CPT | Performed by: FAMILY MEDICINE

## 2020-01-28 PROCEDURE — 99214 OFFICE O/P EST MOD 30 MIN: CPT | Performed by: FAMILY MEDICINE

## 2020-01-28 PROCEDURE — 1170F FXNL STATUS ASSESSED: CPT

## 2020-01-28 PROCEDURE — 3725F SCREEN DEPRESSION PERFORMED: CPT

## 2020-01-28 PROCEDURE — 90662 IIV NO PRSV INCREASED AG IM: CPT

## 2020-01-28 RX ORDER — ALENDRONATE SODIUM 70 MG/1
70 TABLET ORAL
Qty: 12 TABLET | Refills: 3 | Status: SHIPPED | OUTPATIENT
Start: 2020-01-28 | End: 2020-11-10

## 2020-01-28 NOTE — ASSESSMENT & PLAN NOTE
BP Readings from Last 3 Encounters:   01/28/20 126/68   08/08/19 161/83   02/12/19 138/70     Lab Results   Component Value Date    CREATININE 0 98 01/24/2019    EGFR 57 08/27/2018     Controlled on current regimen: Lisinopril 10mg daily Continue current dose   Repeat CMP

## 2020-01-28 NOTE — PATIENT INSTRUCTIONS
Medicare Preventive Visit Patient Instructions  Thank you for completing your Welcome to Medicare Visit or Medicare Annual Wellness Visit today  Your next wellness visit will be due in one year (1/28/2021)  The screening/preventive services that you may require over the next 5-10 years are detailed below  Some tests may not apply to you based off risk factors and/or age  Screening tests ordered at today's visit but not completed yet may show as past due  Also, please note that scanned in results may not display below  Preventive Screenings:  Service Recommendations Previous Testing/Comments   Colorectal Cancer Screening  * Colonoscopy    * Fecal Occult Blood Test (FOBT)/Fecal Immunochemical Test (FIT)  * Fecal DNA/Cologuard Test  * Flexible Sigmoidoscopy Age: 54-65 years old   Colonoscopy: every 10 years (may be performed more frequently if at higher risk)  OR  FOBT/FIT: every 1 year  OR  Cologuard: every 3 years  OR  Sigmoidoscopy: every 5 years  Screening may be recommended earlier than age 48 if at higher risk for colorectal cancer  Also, an individualized decision between you and your healthcare provider will decide whether screening between the ages of 74-80 would be appropriate  Colonoscopy: 08/15/2019  FOBT/FIT: Not on file  Cologuard: Not on file  Sigmoidoscopy: Not on file         Breast Cancer Screening Age: 36 years old  Frequency: every 1-2 years  Not required if history of left and right mastectomy Mammogram: 03/08/2019       Cervical Cancer Screening Between the ages of 21-29, pap smear recommended once every 3 years  Between the ages of 33-67, can perform pap smear with HPV co-testing every 5 years     Recommendations may differ for women with a history of total hysterectomy, cervical cancer, or abnormal pap smears in past  Pap Smear: Not on file       Hepatitis C Screening Once for adults born between [facility]  More frequently in patients at high risk for Hepatitis C Hep C Antibody: 01/24/2019       Diabetes Screening 1-2 times per year if you're at risk for diabetes or have pre-diabetes Fasting glucose: No results in last 5 years   A1C: No results in last 5 years       Cholesterol Screening Once every 5 years if you don't have a lipid disorder  May order more often based on risk factors  Lipid panel: 01/24/2019         Other Preventive Screenings Covered by Medicare:  1  Abdominal Aortic Aneurysm (AAA) Screening: covered once if your at risk  You're considered to be at risk if you have a family history of AAA  2  Lung Cancer Screening: covers low dose CT scan once per year if you meet all of the following conditions: (1) Age 50-69; (2) No signs or symptoms of lung cancer; (3) Current smoker or have quit smoking within the last 15 years; (4) You have a tobacco smoking history of at least 30 pack years (packs per day multiplied by number of years you smoked); (5) You get a written order from a healthcare provider  3  Glaucoma Screening: covered annually if you're considered high risk: (1) You have diabetes OR (2) Family history of glaucoma OR (3)  aged 48 and older OR (3)  American aged 72 and older  3  Osteoporosis Screening: covered every 2 years if you meet one of the following conditions: (1) You're estrogen deficient and at risk for osteoporosis based off medical history and other findings; (2) Have a vertebral abnormality; (3) On glucocorticoid therapy for more than 3 months; (4) Have primary hyperparathyroidism; (5) On osteoporosis medications and need to assess response to drug therapy  · Last bone density test (DXA Scan): 03/08/2019   5  HIV Screening: covered annually if you're between the age of 15-65  Also covered annually if you are younger than 13 and older than 72 with risk factors for HIV infection  For pregnant patients, it is covered up to 3 times per pregnancy      Immunizations:  Immunization Recommendations   Influenza Vaccine Annual influenza vaccination during flu season is recommended for all persons aged >= 6 months who do not have contraindications   Pneumococcal Vaccine (Prevnar and Pneumovax)  * Prevnar = PCV13  * Pneumovax = PPSV23   Adults 25-60 years old: 1-3 doses may be recommended based on certain risk factors  Adults 72 years old: Prevnar (PCV13) vaccine recommended followed by Pneumovax (PPSV23) vaccine  If already received PPSV23 since turning 65, then PCV13 recommended at least one year after PPSV23 dose  Hepatitis B Vaccine 3 dose series if at intermediate or high risk (ex: diabetes, end stage renal disease, liver disease)   Tetanus (Td) Vaccine - COST NOT COVERED BY MEDICARE PART B Following completion of primary series, a booster dose should be given every 10 years to maintain immunity against tetanus  Td may also be given as tetanus wound prophylaxis  Tdap Vaccine - COST NOT COVERED BY MEDICARE PART B Recommended at least once for all adults  For pregnant patients, recommended with each pregnancy  Shingles Vaccine (Shingrix) - COST NOT COVERED BY MEDICARE PART B  2 shot series recommended in those aged 48 and above     Health Maintenance Due:      Topic Date Due    MAMMOGRAM  03/08/2020    CRC Screening: Colonoscopy  08/15/2024    Hepatitis C Screening  Completed     Immunizations Due:      Topic Date Due    DTaP,Tdap,and Td Vaccines (1 - Tdap) 04/15/1956    Influenza Vaccine  07/01/2019     Advance Directives   What are advance directives? Advance directives are legal documents that state your wishes and plans for medical care  These plans are made ahead of time in case you lose your ability to make decisions for yourself  Advance directives can apply to any medical decision, such as the treatments you want, and if you want to donate organs  What are the types of advance directives? There are many types of advance directives, and each state has rules about how to use them   You may choose a combination of any of the following:  · Living will: This is a written record of the treatment you want  You can also choose which treatments you do not want, which to limit, and which to stop at a certain time  This includes surgery, medicine, IV fluid, and tube feedings  · Durable power of  for healthcare [facility]): This is a written record that states who you want to make healthcare choices for you when you are unable to make them for yourself  This person, called a proxy, is usually a family member or a friend  You may choose more than 1 proxy  · Do not resuscitate (DNR) order:  A DNR order is used in case your heart stops beating or you stop breathing  It is a request not to have certain forms of treatment, such as CPR  A DNR order may be included in other types of advance directives  · Medical directive: This covers the care that you want if you are in a coma, near death, or unable to make decisions for yourself  You can list the treatments you want for each condition  Treatment may include pain medicine, surgery, blood transfusions, dialysis, IV or tube feedings, and a ventilator (breathing machine)  · Values history: This document has questions about your views, beliefs, and how you feel and think about life  This information can help others choose the care that you would choose  Why are advance directives important? An advance directive helps you control your care  Although spoken wishes may be used, it is better to have your wishes written down  Spoken wishes can be misunderstood, or not followed  Treatments may be given even if you do not want them  An advance directive may make it easier for your family to make difficult choices about your care  Urinary Incontinence   Urinary incontinence (UI)  is when you lose control of your bladder  UI develops because your bladder cannot store or empty urine properly  The 3 most common types of UI are stress incontinence, urge incontinence, or both    Medicines:   · May be given to help strengthen your bladder control  Report any side effects of medication to your healthcare provider  Do pelvic muscle exercises often:  Your pelvic muscles help you stop urinating  Squeeze these muscles tight for 5 seconds, then relax for 5 seconds  Gradually work up to squeezing for 10 seconds  Do 3 sets of 15 repetitions a day, or as directed  This will help strengthen your pelvic muscles and improve bladder control  Train your bladder:  Go to the bathroom at set times, such as every 2 hours, even if you do not feel the urge to go  You can also try to hold your urine when you feel the urge to go  For example, hold your urine for 5 minutes when you feel the urge to go  As that becomes easier, hold your urine for 10 minutes  Self-care:   · Keep a UI record  Write down how often you leak urine and how much you leak  Make a note of what you were doing when you leaked urine  · Drink liquids as directed  You may need to limit the amount of liquid you drink to help control your urine leakage  Do not drink any liquid right before you go to bed  Limit or do not have drinks that contain caffeine or alcohol  · Prevent constipation  Eat a variety of high-fiber foods  Good examples are high-fiber cereals, beans, vegetables, and whole-grain breads  Walking is the best way to trigger your intestines to have a bowel movement  · Exercise regularly and maintain a healthy weight  Weight loss and exercise will decrease pressure on your bladder and help you control your leakage  · Use a catheter as directed  to help empty your bladder  A catheter is a tiny, plastic tube that is put into your bladder to drain your urine  · Go to behavior therapy as directed  Behavior therapy may be used to help you learn to control your urge to urinate      Weight Management   Why it is important to manage your weight:  Being overweight increases your risk of health conditions such as heart disease, high blood pressure, type 2 diabetes, and certain types of cancer  It can also increase your risk for osteoarthritis, sleep apnea, and other respiratory problems  Aim for a slow, steady weight loss  Even a small amount of weight loss can lower your risk of health problems  How to lose weight safely:  A safe and healthy way to lose weight is to eat fewer calories and get regular exercise  You can lose up about 1 pound a week by decreasing the number of calories you eat by 500 calories each day  Healthy meal plan for weight management:  A healthy meal plan includes a variety of foods, contains fewer calories, and helps you stay healthy  A healthy meal plan includes the following:  · Eat whole-grain foods more often  A healthy meal plan should contain fiber  Fiber is the part of grains, fruits, and vegetables that is not broken down by your body  Whole-grain foods are healthy and provide extra fiber in your diet  Some examples of whole-grain foods are whole-wheat breads and pastas, oatmeal, brown rice, and bulgur  · Eat a variety of vegetables every day  Include dark, leafy greens such as spinach, kale, rod greens, and mustard greens  Eat yellow and orange vegetables such as carrots, sweet potatoes, and winter squash  · Eat a variety of fruits every day  Choose fresh or canned fruit (canned in its own juice or light syrup) instead of juice  Fruit juice has very little or no fiber  · Eat low-fat dairy foods  Drink fat-free (skim) milk or 1% milk  Eat fat-free yogurt and low-fat cottage cheese  Try low-fat cheeses such as mozzarella and other reduced-fat cheeses  · Choose meat and other protein foods that are low in fat  Choose beans or other legumes such as split peas or lentils  Choose fish, skinless poultry (chicken or turkey), or lean cuts of red meat (beef or pork)  Before you cook meat or poultry, cut off any visible fat  · Use less fat and oil  Try baking foods instead of frying them   Add less fat, such as margarine, sour cream, regular salad dressing and mayonnaise to foods  Eat fewer high-fat foods  Some examples of high-fat foods include french fries, doughnuts, ice cream, and cakes  · Eat fewer sweets  Limit foods and drinks that are high in sugar  This includes candy, cookies, regular soda, and sweetened drinks  Exercise:  Exercise at least 30 minutes per day on most days of the week  Some examples of exercise include walking, biking, dancing, and swimming  You can also fit in more physical activity by taking the stairs instead of the elevator or parking farther away from stores  Ask your healthcare provider about the best exercise plan for you  © Copyright [employer] 2018 Information is for End User's use only and may not be sold, redistributed or otherwise used for commercial purposes  All illustrations and images included in CareNotes® are the copyrighted property of [employer]  or [facility] Preventive Visit Patient Instructions  Thank you for completing your Welcome to Medicare Visit or Medicare Annual Wellness Visit today  Your next wellness visit will be due in one year (1/28/2021)  The screening/preventive services that you may require over the next 5-10 years are detailed below  Some tests may not apply to you based off risk factors and/or age  Screening tests ordered at today's visit but not completed yet may show as past due  Also, please note that scanned in results may not display below    Preventive Screenings:  Service Recommendations Previous Testing/Comments   Colorectal Cancer Screening  * Colonoscopy    * Fecal Occult Blood Test (FOBT)/Fecal Immunochemical Test (FIT)  * Fecal DNA/Cologuard Test  * Flexible Sigmoidoscopy Age: 54-65 years old   Colonoscopy: every 10 years (may be performed more frequently if at higher risk)  OR  FOBT/FIT: every 1 year  OR  Cologuard: every 3 years  OR  Sigmoidoscopy: every 5 years  Screening may be recommended earlier than age 48 if at higher risk for colorectal cancer  Also, an individualized decision between you and your healthcare provider will decide whether screening between the ages of 74-80 would be appropriate  Colonoscopy: 08/15/2019  FOBT/FIT: Not on file  Cologuard: Not on file  Sigmoidoscopy: Not on file    Screening Current     Breast Cancer Screening Age: 36 years old  Frequency: every 1-2 years  Not required if history of left and right mastectomy Mammogram: 03/08/2019    Screening Current   Cervical Cancer Screening Between the ages of 21-29, pap smear recommended once every 3 years  Between the ages of 33-67, can perform pap smear with HPV co-testing every 5 years  Recommendations may differ for women with a history of total hysterectomy, cervical cancer, or abnormal pap smears in past  Pap Smear: Not on file    Screening Not Indicated   Hepatitis C Screening Once for adults born between 1945 and 1965  More frequently in patients at high risk for Hepatitis C Hep C Antibody: 01/24/2019    Screening Current   Diabetes Screening 1-2 times per year if you're at risk for diabetes or have pre-diabetes Fasting glucose: No results in last 5 years   A1C: No results in last 5 years       Cholesterol Screening Once every 5 years if you don't have a lipid disorder  May order more often based on risk factors  Lipid panel: 01/24/2019    Screening Not Indicated  History Lipid Disorder     Other Preventive Screenings Covered by Medicare:  6  Abdominal Aortic Aneurysm (AAA) Screening: covered once if your at risk  You're considered to be at risk if you have a family history of AAA    7  Lung Cancer Screening: covers low dose CT scan once per year if you meet all of the following conditions: (1) Age 50-69; (2) No signs or symptoms of lung cancer; (3) Current smoker or have quit smoking within the last 15 years; (4) You have a tobacco smoking history of at least 30 pack years (packs per day multiplied by number of years you smoked); (5) You get a written order from a healthcare provider  8  Glaucoma Screening: covered annually if you're considered high risk: (1) You have diabetes OR (2) Family history of glaucoma OR (3)  aged 48 and older OR (3)  American aged 72 and older  5  Osteoporosis Screening: covered every 2 years if you meet one of the following conditions: (1) You're estrogen deficient and at risk for osteoporosis based off medical history and other findings; (2) Have a vertebral abnormality; (3) On glucocorticoid therapy for more than 3 months; (4) Have primary hyperparathyroidism; (5) On osteoporosis medications and need to assess response to drug therapy  · Last bone density test (DXA Scan): 03/08/2019   10  HIV Screening: covered annually if you're between the age of 15-65  Also covered annually if you are younger than 13 and older than 72 with risk factors for HIV infection  For pregnant patients, it is covered up to 3 times per pregnancy  Immunizations:  Immunization Recommendations   Influenza Vaccine Annual influenza vaccination during flu season is recommended for all persons aged >= 6 months who do not have contraindications   Pneumococcal Vaccine (Prevnar and Pneumovax)  * Prevnar = PCV13  * Pneumovax = PPSV23   Adults 25-60 years old: 1-3 doses may be recommended based on certain risk factors  Adults 72 years old: Prevnar (PCV13) vaccine recommended followed by Pneumovax (PPSV23) vaccine  If already received PPSV23 since turning 65, then PCV13 recommended at least one year after PPSV23 dose  Hepatitis B Vaccine 3 dose series if at intermediate or high risk (ex: diabetes, end stage renal disease, liver disease)   Tetanus (Td) Vaccine - COST NOT COVERED BY MEDICARE PART B Following completion of primary series, a booster dose should be given every 10 years to maintain immunity against tetanus  Td may also be given as tetanus wound prophylaxis     Tdap Vaccine - COST NOT COVERED BY MEDICARE PART B Recommended at least once for all adults  For pregnant patients, recommended with each pregnancy  Shingles Vaccine (Shingrix) - COST NOT COVERED BY MEDICARE PART B  2 shot series recommended in those aged 48 and above     Health Maintenance Due:      Topic Date Due    MAMMOGRAM  03/08/2020    CRC Screening: Colonoscopy  08/15/2024    Hepatitis C Screening  Completed     Immunizations Due:      Topic Date Due    DTaP,Tdap,and Td Vaccines (1 - Tdap) 04/15/1958    Influenza Vaccine  07/01/2019     Advance Directives   What are advance directives? Advance directives are legal documents that state your wishes and plans for medical care  These plans are made ahead of time in case you lose your ability to make decisions for yourself  Advance directives can apply to any medical decision, such as the treatments you want, and if you want to donate organs  What are the types of advance directives? There are many types of advance directives, and each state has rules about how to use them  You may choose a combination of any of the following:  · Living will: This is a written record of the treatment you want  You can also choose which treatments you do not want, which to limit, and which to stop at a certain time  This includes surgery, medicine, IV fluid, and tube feedings  · Durable power of  for healthcare [facility]): This is a written record that states who you want to make healthcare choices for you when you are unable to make them for yourself  This person, called a proxy, is usually a family member or a friend  You may choose more than 1 proxy  · Do not resuscitate (DNR) order:  A DNR order is used in case your heart stops beating or you stop breathing  It is a request not to have certain forms of treatment, such as CPR  A DNR order may be included in other types of advance directives  · Medical directive:   This covers the care that you want if you are in a coma, near death, or unable to make decisions for yourself  You can list the treatments you want for each condition  Treatment may include pain medicine, surgery, blood transfusions, dialysis, IV or tube feedings, and a ventilator (breathing machine)  · Values history: This document has questions about your views, beliefs, and how you feel and think about life  This information can help others choose the care that you would choose  Why are advance directives important? An advance directive helps you control your care  Although spoken wishes may be used, it is better to have your wishes written down  Spoken wishes can be misunderstood, or not followed  Treatments may be given even if you do not want them  An advance directive may make it easier for your family to make difficult choices about your care  Urinary Incontinence   Urinary incontinence (UI)  is when you lose control of your bladder  UI develops because your bladder cannot store or empty urine properly  The 3 most common types of UI are stress incontinence, urge incontinence, or both  Medicines:   · May be given to help strengthen your bladder control  Report any side effects of medication to your healthcare provider  Do pelvic muscle exercises often:  Your pelvic muscles help you stop urinating  Squeeze these muscles tight for 5 seconds, then relax for 5 seconds  Gradually work up to squeezing for 10 seconds  Do 3 sets of 15 repetitions a day, or as directed  This will help strengthen your pelvic muscles and improve bladder control  Train your bladder:  Go to the bathroom at set times, such as every 2 hours, even if you do not feel the urge to go  You can also try to hold your urine when you feel the urge to go  For example, hold your urine for 5 minutes when you feel the urge to go  As that becomes easier, hold your urine for 10 minutes  Self-care:   · Keep a UI record  Write down how often you leak urine and how much you leak   Make a note of what you were doing when you leaked urine   · Drink liquids as directed  You may need to limit the amount of liquid you drink to help control your urine leakage  Do not drink any liquid right before you go to bed  Limit or do not have drinks that contain caffeine or alcohol  · Prevent constipation  Eat a variety of high-fiber foods  Good examples are high-fiber cereals, beans, vegetables, and whole-grain breads  Walking is the best way to trigger your intestines to have a bowel movement  · Exercise regularly and maintain a healthy weight  Weight loss and exercise will decrease pressure on your bladder and help you control your leakage  · Use a catheter as directed  to help empty your bladder  A catheter is a tiny, plastic tube that is put into your bladder to drain your urine  · Go to behavior therapy as directed  Behavior therapy may be used to help you learn to control your urge to urinate  Weight Management   Why it is important to manage your weight:  Being overweight increases your risk of health conditions such as heart disease, high blood pressure, type 2 diabetes, and certain types of cancer  It can also increase your risk for osteoarthritis, sleep apnea, and other respiratory problems  Aim for a slow, steady weight loss  Even a small amount of weight loss can lower your risk of health problems  How to lose weight safely:  A safe and healthy way to lose weight is to eat fewer calories and get regular exercise  You can lose up about 1 pound a week by decreasing the number of calories you eat by 500 calories each day  Healthy meal plan for weight management:  A healthy meal plan includes a variety of foods, contains fewer calories, and helps you stay healthy  A healthy meal plan includes the following:  · Eat whole-grain foods more often  A healthy meal plan should contain fiber  Fiber is the part of grains, fruits, and vegetables that is not broken down by your body   Whole-grain foods are healthy and provide extra fiber in your diet  Some examples of whole-grain foods are whole-wheat breads and pastas, oatmeal, brown rice, and bulgur  · Eat a variety of vegetables every day  Include dark, leafy greens such as spinach, kale, rod greens, and mustard greens  Eat yellow and orange vegetables such as carrots, sweet potatoes, and winter squash  · Eat a variety of fruits every day  Choose fresh or canned fruit (canned in its own juice or light syrup) instead of juice  Fruit juice has very little or no fiber  · Eat low-fat dairy foods  Drink fat-free (skim) milk or 1% milk  Eat fat-free yogurt and low-fat cottage cheese  Try low-fat cheeses such as mozzarella and other reduced-fat cheeses  · Choose meat and other protein foods that are low in fat  Choose beans or other legumes such as split peas or lentils  Choose fish, skinless poultry (chicken or turkey), or lean cuts of red meat (beef or pork)  Before you cook meat or poultry, cut off any visible fat  · Use less fat and oil  Try baking foods instead of frying them  Add less fat, such as margarine, sour cream, regular salad dressing and mayonnaise to foods  Eat fewer high-fat foods  Some examples of high-fat foods include french fries, doughnuts, ice cream, and cakes  · Eat fewer sweets  Limit foods and drinks that are high in sugar  This includes candy, cookies, regular soda, and sweetened drinks  Exercise:  Exercise at least 30 minutes per day on most days of the week  Some examples of exercise include walking, biking, dancing, and swimming  You can also fit in more physical activity by taking the stairs instead of the elevator or parking farther away from stores  Ask your healthcare provider about the best exercise plan for you  © Copyright [employer] 2018 Information is for End User's use only and may not be sold, redistributed or otherwise used for commercial purposes   All illustrations and images included in CareNotes® are the copyrighted property of [employer]  or [facility]

## 2020-01-28 NOTE — PROGRESS NOTES
FAMILY MEDICINE PROGRESS NOTE  Sriram Rincon 69 y o  female   DATE: January 28, 2020     ASSESSMENT and PLAN:  Sirram Rincon is a 69 y o  female with:     Subclinical hypothyroidism  Lab Results   Component Value Date    [id number] 6 200 (H) 10/27/2017    TSH 3 280 01/24/2019   Normal TSH in 2019  History of subclinical hypothyroidism, recheck TSH    Osteopenia of multiple sites  1/2019: T-score of -1 9 in the left hip and 4% osteoporotic hip fracture risk (>3%)  Recommend osteoporotic medication,  start Alendronate 70mg weekly (reviewed r/b) and continue calcium and Vit D supplements    Mild vitamin D deficiency  WNL in 2019, continue OTC supplements (2-4,000) recheck level    Iron deficiency anemia secondary to inadequate dietary iron intake  Lab Results   Component Value Date    WBC 7 1 01/24/2019    HGB 14 5 01/24/2019    HCT 42 9 01/24/2019    MCV 91 01/24/2019     01/24/2019     Normal colonoscopy, so GI bleed, likely diet related, given normal levels 1 year ago, recheck CBC, if persistently at goal, can d/c FeSO4 325mg daily    Mixed hyperlipidemia  Lab Results   Component Value Date    CHOLESTEROL 217 (H) 01/24/2019    HDL 58 01/24/2019    LDLC 139 (H) 01/24/2019    LDLCALC 121 (H) 01/19/2017    TRIG 99 01/24/2019     The 10-year ASCVD risk score (Jasmin White et al , 2013) is: 19 9%    Values used to calculate the score:      Age: 69 years      Sex: Female      Is Non- : No      Diabetic: No      Tobacco smoker: No      Systolic Blood Pressure: 692 mmHg      Is BP treated: Yes      HDL Cholesterol: 58 mg/dL      Total Cholesterol: 217 mg/dL    Uncontrolled  Continue Rosuvastatin 20mg daily and ASA 81mg daily  Reviewed healthy, low cholesterol diet  Recheck FLP      Essential hypertension  BP Readings from Last 3 Encounters:   01/28/20 126/68   08/08/19 161/83   02/12/19 138/70     Lab Results   Component Value Date    CREATININE 0 98 01/24/2019    EGFR 57 08/27/2018 Controlled on current regimen: Lisinopril 10mg daily Continue current dose   Repeat CMP      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 ([address] )  Removed by Dermatology (Dr Stefan Ventura) years ago, now has skin checks every 2 years    RTC 6-12 months or sooner PRN depending on lab results    SUBJECTIVE:  Luis Vallejo is a 69 y o  female who presents today with a chief complaint of Medicare Wellness Visit  Luis Vallejo is here for a 12 month follow-up, she did not have labs done prior to this visit  The active chronic medical problems and medications are as below:   1  HLD- uncontrolled, last year was elevated, so now on Rosuvastatin 20mg daily and fish oil  2  HTN- usually well controlled with lisinopril 10mg daily, but has had a few elevated readings in the past  3  KAREN- recent normal colonoscopy, was on iron supplements for years, but just stopped it recently, was only doing it for peace of mind  4  Vit D def/Osteopenia-on Vit D supplements, no recent fractures  5  Arthritis- tried hemp with some benefit and now uses a new pill from the internet that is made of egg supplements    Review of Systems   Eyes: Negative for visual disturbance  Respiratory: Negative for shortness of breath  Cardiovascular: Negative for chest pain, palpitations and leg swelling  Musculoskeletal: Positive for arthralgias  Neurological: Negative for dizziness and light-headedness  I have reviewed the patient's Past Medical History      Current Outpatient Medications:     ascorbic acid (VITAMIN C) 250 mg tablet, Take 250 mg by mouth daily  , Disp: , Rfl:     aspirin 81 MG tablet, Take 81 mg by mouth daily  , Disp: , Rfl:     Biotin 10 MG CAPS, Take 1 capsule by mouth daily  , Disp: , Rfl:     Calcium Carbonate-Vitamin D (CALCIUM 600+D HIGH POTENCY) 600-400 MG-UNIT per tablet, Take 1 tablet by mouth daily  , Disp: , Rfl:     cholecalciferol (VITAMIN D3) 1,000 units tablet, Take 1,000 Units by mouth daily, Disp: , Rfl:   coenzyme Q-10 100 MG capsule, Take 100 mg by mouth daily  , Disp: , Rfl:     lisinopril (ZESTRIL) 10 mg tablet, TAKE 1 TABLET BY MOUTH  DAILY, Disp: 90 tablet, Rfl: 3    Magnesium Citrate 100 MG TABS, Take 1 tablet by mouth daily  , Disp: , Rfl:     Omega-3 Fatty Acids ([employer] FISH OIL) 1000 MG CAPS, Take by mouth 2 (two) times a day  , Disp: , Rfl:     rosuvastatin (CRESTOR) 20 MG tablet, TAKE 1 TABLET BY MOUTH  DAILY, Disp: 90 tablet, Rfl: 1    alendronate (FOSAMAX) 70 mg tablet, Take 1 tablet (70 mg total) by mouth every 7 days, Disp: 12 tablet, Rfl: 3    OBJECTIVE:  /68 (BP Location: Left arm, Patient Position: Sitting, Cuff Size: Standard)   Temp 97 5 °F (36 4 °C)   Ht 4' 11" (1 499 m)   Wt 69 6 kg (153 lb 8 oz)   BMI 31 00 kg/m²    Physical Exam   Constitutional: She is oriented to person, place, and time  She appears well-developed and well-nourished  No distress  obese   Cardiovascular: Normal rate, regular rhythm and normal heart sounds  No murmur heard  Pulmonary/Chest: Effort normal and breath sounds normal  No respiratory distress  She has no wheezes  She has no rales  Neurological: She is alert and oriented to person, place, and time  Skin: She is not diaphoretic  Psychiatric: She has a normal mood and affect  Vitals reviewed  BMI Counseling: Body mass index is 31 kg/m²  The BMI is above normal  Nutrition recommendations include reducing intake of cholesterol  Mari Xiong MD    Note: Portions of the record have been created with voice recognition software  Occasional wrong word or "sound a like" substitutions may have occurred due to the inherent limitations of voice recognition software  Read the chart carefully and recognize, using context, where substitutions have occurred

## 2020-01-28 NOTE — PROGRESS NOTES
Assessment and Plan:     Problem List Items Addressed This Visit        Endocrine    Subclinical hypothyroidism     Lab Results   Component Value Date    [id number] 5 960 (H) 10/27/2017    TSH 3 280 01/24/2019   Normal TSH in 2019  History of subclinical hypothyroidism, recheck TSH         Relevant Orders    TSH, 3rd generation with Free T4 reflex       Cardiovascular and Mediastinum    Essential hypertension - Primary     BP Readings from Last 3 Encounters:   01/28/20 126/68   08/08/19 161/83   02/12/19 138/70     Lab Results   Component Value Date    CREATININE 0 98 01/24/2019    EGFR 57 08/27/2018     Controlled on current regimen: Lisinopril 10mg daily Continue current dose   Repeat CMP           Relevant Orders    Comprehensive metabolic panel       Musculoskeletal and Integument    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 ([address] )     Removed by Dermatology (Dr Dianna Marlow) years ago, now has skin checks every 2 years         Osteopenia of multiple sites     1/2019: T-score of -1 9 in the left hip and 4% osteoporotic hip fracture risk (>3%)  Recommend osteoporotic medication,  start Alendronate 70mg and continue calcium and Vit D supplements         Relevant Medications    alendronate (FOSAMAX) 70 mg tablet       Other    Mixed hyperlipidemia     Lab Results   Component Value Date    CHOLESTEROL 217 (H) 01/24/2019    HDL 58 01/24/2019    LDLC 139 (H) 01/24/2019    LDLCALC 121 (H) 01/19/2017    TRIG 99 01/24/2019     The 10-year ASCVD risk score (Yesenia Corcoran et al , 2013) is: 19 9%    Values used to calculate the score:      Age: 69 years      Sex: Female      Is Non- : No      Diabetic: No      Tobacco smoker: No      Systolic Blood Pressure: 951 mmHg      Is BP treated: Yes      HDL Cholesterol: 58 mg/dL      Total Cholesterol: 217 mg/dL    Uncontrolled  Continue Rosuvastatin 20mg daily and ASA 81mg daily  Reviewed healthy, low cholesterol diet  Recheck FLP           Relevant Orders Lipid Panel with Direct LDL reflex    Mild vitamin D deficiency     WNL in 2019, continue OTC supplements (2-4,000) recheck level         Relevant Orders    Vitamin D 25 hydroxy    Iron deficiency anemia secondary to inadequate dietary iron intake     Lab Results   Component Value Date    WBC 7 1 01/24/2019    HGB 14 5 01/24/2019    HCT 42 9 01/24/2019    MCV 91 01/24/2019     01/24/2019     Normal colonoscopy, so GI bleed, likely diet related, given normal levels 1 year ago, recheck CBC, if persistently at goal, can d/c FeSO4 325mg daily         Relevant Orders    CBC and differential      Other Visit Diagnoses     Melanoma in situ of left lower limb, including hip ([address] )        Need for vaccination        Relevant Orders    influenza vaccine, [id number], high-dose, PF 0 5 mL (FLUZONE HIGH-DOSE) (Completed)    Screening for breast cancer        Relevant Orders    Mammo screening bilateral w cad    Medicare annual wellness visit, subsequent            BMI Counseling: Body mass index is 31 kg/m²  The BMI is above normal  Nutrition recommendations include consuming healthier snacks and reducing intake of cholesterol  Preventive health issues were discussed with patient, and age appropriate screening tests were ordered as noted in patient's After Visit Summary  Personalized health advice and appropriate referrals for health education or preventive services given if needed, as noted in patient's After Visit Summary  History of Present Illness:     Patient presents for Medicare Annual Wellness visit    Patient Care Team:  Tracy Ngo MD as PCP - General (Family Medicine)  Sergio Garcia (Dermatology)  Pa Toledo MD (Oncology)  Joann Foley OD (Optometry)  Sergio Acevedo MD (Colon and Rectal Surgery)  Cr Finnegan DPM (Podiatry)  Lawrence Ware MD (Orthopedic Surgery)     Problem List:     Patient Active Problem List   Diagnosis    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 ([address] )    Essential hypertension    Mixed hyperlipidemia    Subclinical hypothyroidism    Mild vitamin D deficiency    Osteopenia of multiple sites    Localized osteoarthritis of right hand    Iron deficiency anemia secondary to inadequate dietary iron intake    Class 1 obesity due to excess calories with serious comorbidity and body mass index (BMI) of 30 0 to 30 9 in adult      Past Medical and Surgical History:     Past Medical History:   Diagnosis Date    Diverticulosis     Hyperlipidemia     Hypertension     Osteoarthritis     Osteopenia      Past Surgical History:   Procedure Laterality Date    INDUCED       x2    KNEE ARTHROSCOPY      therapeutic     NEUROPLASTY / TRANSPOSITION MEDIAN NERVE AT CARPAL TUNNEL        Family History:     Family History   Problem Relation Age of Onset    Stroke Mother         syndrome    Hypertension Mother     Hypothyroidism Mother     Osteoarthritis Mother     Stroke Father         syndrome     Diabetes Father     Hypertension Father     Osteoarthritis Father     Diabetes Brother     Melanoma Sister     Squamous cell carcinoma Sister     Hypothyroidism Sister       Social History:     Social History     Socioeconomic History    Marital status: /Civil Union     Spouse name: None    Number of children: None    Years of education: None    Highest education level: None   Occupational History    None   Social Needs    Financial resource strain: None    Food insecurity:     Worry: None     Inability: None    Transportation needs:     Medical: None     Non-medical: None   Tobacco Use    Smoking status: Former Smoker    Smokeless tobacco: Never Used   Substance and Sexual Activity    Alcohol use: Yes     Comment: social    Drug use: No    Sexual activity: None   Lifestyle    Physical activity:     Days per week: None     Minutes per session: None    Stress: None   Relationships    Social connections:     Talks on phone: None     Gets together: None     Attends Synagogue service: None     Active member of club or organization: None     Attends meetings of clubs or organizations: None     Relationship status: None    Intimate partner violence:     Fear of current or ex partner: None     Emotionally abused: None     Physically abused: None     Forced sexual activity: None   Other Topics Concern    None   Social History Narrative    Caffeine use     Exercising erratically        Medications and Allergies:     Current Outpatient Medications   Medication Sig Dispense Refill    ascorbic acid (VITAMIN C) 250 mg tablet Take 250 mg by mouth daily        aspirin 81 MG tablet Take 81 mg by mouth daily        Biotin 10 MG CAPS Take 1 capsule by mouth daily        Calcium Carbonate-Vitamin D (CALCIUM 600+D HIGH POTENCY) 600-400 MG-UNIT per tablet Take 1 tablet by mouth daily        cholecalciferol (VITAMIN D3) 1,000 units tablet Take 1,000 Units by mouth daily      coenzyme Q-10 100 MG capsule Take 100 mg by mouth daily        lisinopril (ZESTRIL) 10 mg tablet TAKE 1 TABLET BY MOUTH  DAILY 90 tablet 3    Magnesium Citrate 100 MG TABS Take 1 tablet by mouth daily        Omega-3 Fatty Acids ([employer] FISH OIL) 1000 MG CAPS Take by mouth 2 (two) times a day        rosuvastatin (CRESTOR) 20 MG tablet TAKE 1 TABLET BY MOUTH  DAILY 90 tablet 1    alendronate (FOSAMAX) 70 mg tablet Take 1 tablet (70 mg total) by mouth every 7 days 12 tablet 3     No current facility-administered medications for this visit        Allergies   Allergen Reactions    Penicillins Rash    Sulfamethoxazole-Trimethoprim Rash     Reaction Date: [id number];       Immunizations:     Immunization History   Administered Date(s) Administered    Influenza Split High Dose Preservative Free IM 12/19/2013, 12/22/2014, 12/23/2015, 01/19/2017, 01/18/2018    Influenza TIV (IM) 01/08/2013    Influenza, high dose seasonal 0 5 mL 10/18/2018, 01/28/2020    Pneumococcal Conjugate 13-Valent 12/23/2015    Pneumococcal Polysaccharide PPV23 12/19/2013    Td (adult), adsorbed 01/28/2014    Varicella 01/01/2010    Zoster 1945    Zoster Vaccine Recombinant 12/04/2019      Health Maintenance:         Topic Date Due    MAMMOGRAM  03/08/2020    CRC Screening: Colonoscopy  08/15/2024    Hepatitis C Screening  Completed     There are no preventive care reminders to display for this patient  Medicare Health Risk Assessment:     /68 (BP Location: Left arm, Patient Position: Sitting, Cuff Size: Standard)   Temp 97 5 °F (36 4 °C)   Ht 4' 11" (1 499 m)   Wt 69 6 kg (153 lb 8 oz)   BMI 31 00 kg/m²      Jaime Lloyd is here for her Subsequent Wellness visit  Last Medicare Wellness visit information reviewed, patient interviewed, no change since last AWV  Health Risk Assessment:   Patient rates overall health as very good  Patient feels that their physical health rating is same  Eyesight was rated as same  Hearing was rated as same  Patient feels that their emotional and mental health rating is same  Pain experienced in the last 7 days has been some  Patient's pain rating has been 5/10  Patient states that she has experienced no weight loss or gain in last 6 months  Depression Screening:   PHQ-2 Score: 0      Fall Risk Screening: In the past year, patient has experienced: no history of falling in past year      Urinary Incontinence Screening:   Patient has leaked urine accidently in the last six months  Home Safety:  Patient does not have trouble with stairs inside or outside of their home  Patient has working smoke alarms and has no working carbon monoxide detector  Home safety hazards include: none  Lack of grab bars  In the bathroom    Recommended getting CO monitor    Nutrition:   Current diet is Regular and Limited junk food  Medications:   Patient is currently taking over-the-counter supplements   OTC medications include: see medication list  Patient is able to manage medications  Activities of Daily Living (ADLs)/Instrumental Activities of Daily Living (IADLs):   Walk and transfer into and out of bed and chair?: Yes  Dress and groom yourself?: Yes    Bathe or shower yourself?: Yes    Feed yourself? Yes  Do your laundry/housekeeping?: Yes  Manage your money, pay your bills and track your expenses?: Yes  Make your own meals?: Yes    Do your own shopping?: Yes    Previous Hospitalizations:   Any hospitalizations or ED visits within the last 12 months?: No      Advance Care Planning:   Living will: Yes    Durable POA for healthcare: Yes    Advanced directive: Yes    Advanced directive counseling given: Yes      Cognitive Screening:   Provider or family/friend/caregiver concerned regarding cognition?: No    PREVENTIVE SCREENINGS      Cardiovascular Screening:    General: Screening Not Indicated, History Lipid Disorder and Risks and Benefits Discussed    Due for: Lipid Panel      Diabetes Screening:     General: Risks and Benefits Discussed and Screening Current    Due for: Blood Glucose      Colorectal Cancer Screening:     General: Screening Current and Risks and Benefits Discussed      Breast Cancer Screening:     General: Screening Current and Risks and Benefits Discussed      Cervical Cancer Screening:    General: Screening Not Indicated and Risks and Benefits Discussed      Osteoporosis Screening:    General: Risks and Benefits Discussed and Screening Current      Abdominal Aortic Aneurysm (AAA) Screening:        General: Screening Not Indicated      Lung Cancer Screening:     General: Screening Not Indicated      Hepatitis C Screening:    General: Screening Current      Lara Miguel MD

## 2020-01-28 NOTE — ASSESSMENT & PLAN NOTE
Lab Results   Component Value Date    WBC 7 1 01/24/2019    HGB 14 5 01/24/2019    HCT 42 9 01/24/2019    MCV 91 01/24/2019     01/24/2019     Normal colonoscopy, so GI bleed, likely diet related, given normal levels 1 year ago, recheck CBC, if persistently at goal, can d/c FeSO4 325mg daily

## 2020-01-28 NOTE — ASSESSMENT & PLAN NOTE
1/2019: T-score of -1 9 in the left hip and 4% osteoporotic hip fracture risk (>3%)  Recommend osteoporotic medication,  start Alendronate 70mg weekly (reviewed r/b) and continue calcium and Vit D supplements

## 2020-01-28 NOTE — ASSESSMENT & PLAN NOTE
Lab Results   Component Value Date    [id number] 5 960 (H) 10/27/2017    TSH 3 280 01/24/2019   Normal TSH in 2019  History of subclinical hypothyroidism, recheck TSH

## 2020-01-30 ENCOUNTER — APPOINTMENT (OUTPATIENT)
Dept: LAB | Facility: CLINIC | Age: 75
End: 2020-01-30
Payer: COMMERCIAL

## 2020-01-30 DIAGNOSIS — D50.8 IRON DEFICIENCY ANEMIA SECONDARY TO INADEQUATE DIETARY IRON INTAKE: ICD-10-CM

## 2020-01-30 DIAGNOSIS — I10 ESSENTIAL HYPERTENSION: ICD-10-CM

## 2020-01-30 DIAGNOSIS — E55.9 MILD VITAMIN D DEFICIENCY: ICD-10-CM

## 2020-01-30 DIAGNOSIS — E78.2 MIXED HYPERLIPIDEMIA: ICD-10-CM

## 2020-01-30 DIAGNOSIS — E03.8 SUBCLINICAL HYPOTHYROIDISM: ICD-10-CM

## 2020-01-30 LAB
25(OH)D3 SERPL-MCNC: 43.2 NG/ML (ref 30–100)
ALBUMIN SERPL BCP-MCNC: 3.6 G/DL (ref 3.5–5)
ALP SERPL-CCNC: 98 U/L (ref 46–116)
ALT SERPL W P-5'-P-CCNC: 36 U/L (ref 12–78)
ANION GAP SERPL CALCULATED.3IONS-SCNC: 5 MMOL/L (ref 4–13)
AST SERPL W P-5'-P-CCNC: 23 U/L (ref 5–45)
BASOPHILS # BLD AUTO: 0.09 THOUSANDS/ΜL (ref 0–0.1)
BASOPHILS NFR BLD AUTO: 1 % (ref 0–1)
BILIRUB SERPL-MCNC: 0.48 MG/DL (ref 0.2–1)
BUN SERPL-MCNC: 19 MG/DL (ref 5–25)
CALCIUM SERPL-MCNC: 8.8 MG/DL (ref 8.3–10.1)
CHLORIDE SERPL-SCNC: 110 MMOL/L (ref 100–108)
CHOLEST SERPL-MCNC: 157 MG/DL (ref 50–200)
CO2 SERPL-SCNC: 27 MMOL/L (ref 21–32)
CREAT SERPL-MCNC: 0.82 MG/DL (ref 0.6–1.3)
EOSINOPHIL # BLD AUTO: 0.24 THOUSAND/ΜL (ref 0–0.61)
EOSINOPHIL NFR BLD AUTO: 4 % (ref 0–6)
ERYTHROCYTE [DISTWIDTH] IN BLOOD BY AUTOMATED COUNT: 13.1 % (ref 11.6–15.1)
GFR SERPL CREATININE-BSD FRML MDRD: 72 ML/MIN/1.73SQ M
GLUCOSE SERPL-MCNC: 88 MG/DL (ref 65–140)
HCT VFR BLD AUTO: 43.6 % (ref 34.8–46.1)
HDLC SERPL-MCNC: 54 MG/DL
HGB BLD-MCNC: 14.2 G/DL (ref 11.5–15.4)
IMM GRANULOCYTES # BLD AUTO: 0.01 THOUSAND/UL (ref 0–0.2)
IMM GRANULOCYTES NFR BLD AUTO: 0 % (ref 0–2)
LDLC SERPL CALC-MCNC: 76 MG/DL (ref 0–100)
LYMPHOCYTES # BLD AUTO: 2.95 THOUSANDS/ΜL (ref 0.6–4.47)
LYMPHOCYTES NFR BLD AUTO: 43 % (ref 14–44)
MCH RBC QN AUTO: 30 PG (ref 26.8–34.3)
MCHC RBC AUTO-ENTMCNC: 32.6 G/DL (ref 31.4–37.4)
MCV RBC AUTO: 92 FL (ref 82–98)
MONOCYTES # BLD AUTO: 0.54 THOUSAND/ΜL (ref 0.17–1.22)
MONOCYTES NFR BLD AUTO: 8 % (ref 4–12)
NEUTROPHILS # BLD AUTO: 3 THOUSANDS/ΜL (ref 1.85–7.62)
NEUTS SEG NFR BLD AUTO: 44 % (ref 43–75)
NRBC BLD AUTO-RTO: 0 /100 WBCS
PLATELET # BLD AUTO: 248 THOUSANDS/UL (ref 149–390)
PMV BLD AUTO: 10 FL (ref 8.9–12.7)
POTASSIUM SERPL-SCNC: 4 MMOL/L (ref 3.5–5.3)
PROT SERPL-MCNC: 7 G/DL (ref 6.4–8.2)
RBC # BLD AUTO: 4.73 MILLION/UL (ref 3.81–5.12)
SODIUM SERPL-SCNC: 142 MMOL/L (ref 136–145)
T4 FREE SERPL-MCNC: 0.9 NG/DL (ref 0.76–1.46)
TRIGL SERPL-MCNC: 135 MG/DL
TSH SERPL DL<=0.05 MIU/L-ACNC: 4.74 UIU/ML (ref 0.36–3.74)
WBC # BLD AUTO: 6.83 THOUSAND/UL (ref 4.31–10.16)

## 2020-01-30 PROCEDURE — 84439 ASSAY OF FREE THYROXINE: CPT | Performed by: FAMILY MEDICINE

## 2020-01-30 PROCEDURE — 85025 COMPLETE CBC W/AUTO DIFF WBC: CPT | Performed by: FAMILY MEDICINE

## 2020-01-30 PROCEDURE — 82306 VITAMIN D 25 HYDROXY: CPT

## 2020-01-30 PROCEDURE — 84443 ASSAY THYROID STIM HORMONE: CPT | Performed by: FAMILY MEDICINE

## 2020-01-30 PROCEDURE — 36415 COLL VENOUS BLD VENIPUNCTURE: CPT | Performed by: FAMILY MEDICINE

## 2020-01-30 PROCEDURE — 80053 COMPREHEN METABOLIC PANEL: CPT

## 2020-01-30 PROCEDURE — 80061 LIPID PANEL: CPT

## 2020-02-19 ENCOUNTER — ANNUAL EXAM (OUTPATIENT)
Dept: FAMILY MEDICINE CLINIC | Facility: CLINIC | Age: 75
End: 2020-02-19
Payer: COMMERCIAL

## 2020-02-19 VITALS
RESPIRATION RATE: 16 BRPM | WEIGHT: 154 LBS | HEIGHT: 60 IN | SYSTOLIC BLOOD PRESSURE: 150 MMHG | HEART RATE: 79 BPM | DIASTOLIC BLOOD PRESSURE: 70 MMHG | OXYGEN SATURATION: 97 % | BODY MASS INDEX: 30.23 KG/M2 | TEMPERATURE: 96 F

## 2020-02-19 DIAGNOSIS — Z01.419 ENCOUNTER FOR GYNECOLOGICAL EXAMINATION WITHOUT ABNORMAL FINDING: Primary | ICD-10-CM

## 2020-02-19 DIAGNOSIS — N89.8 VAGINAL DISCHARGE: ICD-10-CM

## 2020-02-19 PROCEDURE — 87624 HPV HI-RISK TYP POOLED RSLT: CPT | Performed by: FAMILY MEDICINE

## 2020-02-19 PROCEDURE — 87660 TRICHOMONAS VAGIN DIR PROBE: CPT | Performed by: FAMILY MEDICINE

## 2020-02-19 PROCEDURE — G0145 SCR C/V CYTO,THINLAYER,RESCR: HCPCS | Performed by: FAMILY MEDICINE

## 2020-02-19 PROCEDURE — 87510 GARDNER VAG DNA DIR PROBE: CPT | Performed by: FAMILY MEDICINE

## 2020-02-19 PROCEDURE — 1160F RVW MEDS BY RX/DR IN RCRD: CPT | Performed by: FAMILY MEDICINE

## 2020-02-19 PROCEDURE — 99397 PER PM REEVAL EST PAT 65+ YR: CPT | Performed by: FAMILY MEDICINE

## 2020-02-19 PROCEDURE — 87491 CHLMYD TRACH DNA AMP PROBE: CPT | Performed by: FAMILY MEDICINE

## 2020-02-19 PROCEDURE — 87480 CANDIDA DNA DIR PROBE: CPT | Performed by: FAMILY MEDICINE

## 2020-02-19 PROCEDURE — 87591 N.GONORRHOEAE DNA AMP PROB: CPT | Performed by: FAMILY MEDICINE

## 2020-02-19 NOTE — PROGRESS NOTES
ASSESSMENT & PLAN: Marvel Jin is a 69 y o   with normal gynecologic exam     1   Routine well woman exam done today  2  Pap and HPV:  The patient's last pap was   Pap and cotesting was done today  Current ASCCP Guidelines reviewed  3   Mammogram ordered  4  Colonoscopy performed   5  The following were reviewed in today's visit: breast self exam, mammography screening ordered, STD testing, menopause, osteoporosis, exercise and healthy diet  6  Postmenopausal     CC: Annual Gynecologic Examination    HPI: Marvel Jin is a 69 y o   who presents for annual gynecologic examination  She has the following concerns:  Has had fzzy discharge for the past 3 days, has been improving, but since she hasnt had a vaginal exam in years, she was concerned  Health Maintenance:    She exercises 0 days per week  Patients does not follow a healthy diet  She does not perform regular monthly self breast exams  She feels safe at home  She wears her seatbelt routinely  Her last pap was 2015    Last mammogram: 2019  Last colonoscopy: 8/15/19 [id number]    Past Medical History:   Diagnosis Date    Diverticulosis     Hyperlipidemia     Hypertension     Osteoarthritis     Osteopenia        Past Surgical History:   Procedure Laterality Date    INDUCED       x2    KNEE ARTHROSCOPY      therapeutic     NEUROPLASTY / TRANSPOSITION MEDIAN NERVE AT CARPAL TUNNEL       Past OB/Gyn History:  OB History        2    Para        Term   0            AB        Living           SAB        TAB        Ectopic        Multiple        Live Births               Obstetric Comments   Hx of  x 2            No LMP recorded  Patient is postmenopausal    History of sexually transmitted infection No  History of abnormal pap smears  No    Patient is not currently sexually active  heterosexual Birth control: none      Family History   Problem Relation Age of Onset    Stroke Mother         syndrome    Hypertension Mother     Hypothyroidism Mother     Osteoarthritis Mother     Stroke Father         syndrome     Diabetes Father     Hypertension Father     Osteoarthritis Father     Diabetes Brother     Melanoma Sister     Squamous cell carcinoma Sister     Hypothyroidism Sister        Social History:  Social History     Socioeconomic History    Marital status: /Civil Union     Spouse name: Not on file    Number of children: Not on file    Years of education: Not on file    Highest education level: Not on file   Occupational History    Not on file   Social Needs    Financial resource strain: Not on file    Food insecurity:     Worry: Not on file     Inability: Not on file    Transportation needs:     Medical: Not on file     Non-medical: Not on file   Tobacco Use    Smoking status: Former Smoker    Smokeless tobacco: Never Used   Substance and Sexual Activity    Alcohol use:  Yes     Alcohol/week: 2 0 standard drinks     Types: 2 Glasses of wine per week     Binge frequency: Daily or almost daily     Comment: social    Drug use: No    Sexual activity: Not on file   Lifestyle    Physical activity:     Days per week: Not on file     Minutes per session: Not on file    Stress: Not on file   Relationships    Social connections:     Talks on phone: Not on file     Gets together: Not on file     Attends Evangelical service: Not on file     Active member of club or organization: Not on file     Attends meetings of clubs or organizations: Not on file     Relationship status: Not on file    Intimate partner violence:     Fear of current or ex partner: Not on file     Emotionally abused: Not on file     Physically abused: Not on file     Forced sexual activity: Not on file   Other Topics Concern    Not on file   Social History Narrative    Caffeine use     Exercising erratically      Allergies   Allergen Reactions    Penicillins Rash    Sulfamethoxazole-Trimethoprim Rash     Reaction Date: [id number];        Current Outpatient Medications:     alendronate (FOSAMAX) 70 mg tablet, Take 1 tablet (70 mg total) by mouth every 7 days, Disp: 12 tablet, Rfl: 3    ascorbic acid (VITAMIN C) 250 mg tablet, Take 250 mg by mouth daily  , Disp: , Rfl:     aspirin 81 MG tablet, Take 81 mg by mouth daily  , Disp: , Rfl:     Biotin 10 MG CAPS, Take 1 capsule by mouth daily  , Disp: , Rfl:     Calcium Carbonate-Vitamin D (CALCIUM 600+D HIGH POTENCY) 600-400 MG-UNIT per tablet, Take 1 tablet by mouth daily  , Disp: , Rfl:     cholecalciferol (VITAMIN D3) 1,000 units tablet, Take 1,000 Units by mouth daily, Disp: , Rfl:     coenzyme Q-10 100 MG capsule, Take 100 mg by mouth daily  , Disp: , Rfl:     lisinopril (ZESTRIL) 10 mg tablet, TAKE 1 TABLET BY MOUTH  DAILY, Disp: 90 tablet, Rfl: 3    Magnesium Citrate 100 MG TABS, Take 1 tablet by mouth daily  , Disp: , Rfl:     Omega-3 Fatty Acids ([employer] FISH OIL) 1000 MG CAPS, Take by mouth 2 (two) times a day  , Disp: , Rfl:     rosuvastatin (CRESTOR) 20 MG tablet, TAKE 1 TABLET BY MOUTH  DAILY, Disp: 90 tablet, Rfl: 1    Review of Systems:  A complete review of systems was performed and was negative, except as listed  Physical Exam:  /70   Pulse 79   Temp (!) 96 °F (35 6 °C)   Resp 16   Ht 5' (1 524 m)   Wt 69 9 kg (154 lb)   SpO2 97%   BMI 30 08 kg/m²    GEN: The patient was alert and oriented x3, pleasant well-appearing female in no acute distress  HEENT:  Unremarkable, no anterior or posterior lymphadenopathy, no thyromegaly  CV:  RRR, no murmurs  RESP:  Clear to auscultation bilaterally  BREAST:  Symmetric breasts with no palpable breast masses or obvious breast lesions  She has no retractions or nipple discharge  She has no axillary abnormalities or palpable masses  Self breast exam is taught    ABD:  Soft, nontender, nondistended, normoactive bowel sounds,   EXT:  WWP, nontender, no edema  URINARY: Normal urethral meatus  PELVIC:  Normal appearing external female genitalia, normal vaginal epithelium, Yes, thin, clear discharge  Cervix present   Uterus anteverted    ANUS: Normal, no hemorrhoids

## 2020-02-20 LAB
CANDIDA RRNA VAG QL PROBE: NEGATIVE
G VAGINALIS RRNA GENITAL QL PROBE: NEGATIVE
T VAGINALIS RRNA GENITAL QL PROBE: NEGATIVE

## 2020-02-21 LAB
C TRACH DNA SPEC QL NAA+PROBE: NEGATIVE
HPV HR 12 DNA CVX QL NAA+PROBE: NEGATIVE
HPV16 DNA CVX QL NAA+PROBE: NEGATIVE
HPV18 DNA CVX QL NAA+PROBE: NEGATIVE
N GONORRHOEA DNA SPEC QL NAA+PROBE: NEGATIVE

## 2020-02-25 ENCOUNTER — TELEPHONE (OUTPATIENT)
Dept: FAMILY MEDICINE CLINIC | Facility: CLINIC | Age: 75
End: 2020-02-25

## 2020-02-25 LAB
LAB AP GYN PRIMARY INTERPRETATION: NORMAL
Lab: NORMAL

## 2020-02-25 NOTE — TELEPHONE ENCOUNTER
Please call Sana Rodríguez and let her know that her labs were normal- Pap normal, no HPV, gonorrhea, chlamydia, bv, yeast infection or vaginal infection  Likely the symptoms she was having is a normal, physiologic vaginal discharge  Thank you! Annual Exam on 02/19/2020   Component Date Value Ref Range Status    Case Report 02/19/2020    Final                    Value:Gynecologic Cytology Report                       Case: [id number]                                  Authorizing Provider:  Shelah Rands Charis Pallas, MD        Collected:           02/19/2020 0851              Ordering Location:     [facility]   Received:            02/19/2020 0852              First Screen:          ALETA Maldonado                                                    Specimen:    LIQUID-BASED PAP, SCREENING, Cervix                                                        Primary Interpretation 02/19/2020 Negative for intraepithelial lesion or malignancy   Final    Specimen Adequacy 02/19/2020 Satisfactory for evaluation  (See note)   Final    Note 02/19/2020    Final                    Value: This result contains rich text formatting which cannot be displayed here   Additional Information 02/19/2020    Final                    Value: This result contains rich text formatting which cannot be displayed here   Candida Species 02/19/2020 Negative  Negative Final    Gardnerella vaginalis 02/19/2020 Negative  Negative Final    Trichomonas vaginalis 02/19/2020 Negative  Negative Final    N gonorrhoeae, DNA Probe 02/19/2020 Negative  Negative Final    Chlamydia trachomatis, DNA Probe 02/19/2020 Negative  Negative Final    HPV Other HR 02/19/2020 Negative  Negative Final    HPV types: 64,08,31,03,70,29,26,93,99,36,43 and 68 DNA are undetectable or below the pre-set threshold      HPV16 02/19/2020 Negative  Negative Final    HPV18 02/19/2020 Negative  Negative Final

## 2020-05-23 DIAGNOSIS — E78.01 FAMILIAL HYPERCHOLESTEROLEMIA: ICD-10-CM

## 2020-05-26 RX ORDER — ROSUVASTATIN CALCIUM 20 MG/1
TABLET, COATED ORAL
Qty: 90 TABLET | Refills: 1 | Status: SHIPPED | OUTPATIENT
Start: 2020-05-26 | End: 2020-08-13 | Stop reason: SDUPTHER

## 2020-06-18 ENCOUNTER — OFFICE VISIT (OUTPATIENT)
Dept: FAMILY MEDICINE CLINIC | Facility: CLINIC | Age: 75
End: 2020-06-18
Payer: COMMERCIAL

## 2020-06-18 VITALS
SYSTOLIC BLOOD PRESSURE: 140 MMHG | TEMPERATURE: 97 F | BODY MASS INDEX: 30.23 KG/M2 | OXYGEN SATURATION: 97 % | HEIGHT: 60 IN | DIASTOLIC BLOOD PRESSURE: 74 MMHG | WEIGHT: 154 LBS | HEART RATE: 67 BPM | RESPIRATION RATE: 16 BRPM

## 2020-06-18 DIAGNOSIS — T63.484A INSECT STINGS, UNDETERMINED INTENT, INITIAL ENCOUNTER: Primary | ICD-10-CM

## 2020-06-18 DIAGNOSIS — L03.113 CELLULITIS OF RIGHT UPPER EXTREMITY: ICD-10-CM

## 2020-06-18 PROCEDURE — 3077F SYST BP >= 140 MM HG: CPT | Performed by: PHYSICIAN ASSISTANT

## 2020-06-18 PROCEDURE — 4040F PNEUMOC VAC/ADMIN/RCVD: CPT | Performed by: PHYSICIAN ASSISTANT

## 2020-06-18 PROCEDURE — 99214 OFFICE O/P EST MOD 30 MIN: CPT | Performed by: PHYSICIAN ASSISTANT

## 2020-06-18 PROCEDURE — 3008F BODY MASS INDEX DOCD: CPT | Performed by: PHYSICIAN ASSISTANT

## 2020-06-18 PROCEDURE — 1160F RVW MEDS BY RX/DR IN RCRD: CPT | Performed by: PHYSICIAN ASSISTANT

## 2020-06-18 PROCEDURE — 1036F TOBACCO NON-USER: CPT | Performed by: PHYSICIAN ASSISTANT

## 2020-06-18 PROCEDURE — 3078F DIAST BP <80 MM HG: CPT | Performed by: PHYSICIAN ASSISTANT

## 2020-06-18 RX ORDER — CEPHALEXIN 500 MG/1
500 CAPSULE ORAL EVERY 12 HOURS SCHEDULED
Qty: 10 CAPSULE | Refills: 0 | Status: SHIPPED | OUTPATIENT
Start: 2020-06-18 | End: 2020-06-18 | Stop reason: SDUPTHER

## 2020-06-18 RX ORDER — PREDNISONE 20 MG/1
20 TABLET ORAL DAILY
Qty: 5 TABLET | Refills: 0 | Status: SHIPPED | OUTPATIENT
Start: 2020-06-18 | End: 2020-06-23

## 2020-06-18 RX ORDER — CEPHALEXIN 500 MG/1
500 CAPSULE ORAL EVERY 12 HOURS SCHEDULED
Qty: 10 CAPSULE | Refills: 0 | Status: SHIPPED | OUTPATIENT
Start: 2020-06-18 | End: 2020-06-23

## 2020-06-19 ENCOUNTER — TELEPHONE (OUTPATIENT)
Dept: FAMILY MEDICINE CLINIC | Facility: CLINIC | Age: 75
End: 2020-06-19

## 2020-08-13 DIAGNOSIS — E78.01 FAMILIAL HYPERCHOLESTEROLEMIA: ICD-10-CM

## 2020-08-13 RX ORDER — ROSUVASTATIN CALCIUM 20 MG/1
20 TABLET, COATED ORAL DAILY
Qty: 90 TABLET | Refills: 1 | Status: SHIPPED | OUTPATIENT
Start: 2020-08-13 | End: 2020-08-31 | Stop reason: SDUPTHER

## 2020-08-13 NOTE — TELEPHONE ENCOUNTER
Patient phoned requesting refill on Rosuvastatin 20 mg tabs to be called to [facility]     States she is out of the medication as of today, and asked if ok for her to not be taking until she receives the refill

## 2020-08-31 DIAGNOSIS — E78.01 FAMILIAL HYPERCHOLESTEROLEMIA: ICD-10-CM

## 2020-08-31 RX ORDER — ROSUVASTATIN CALCIUM 20 MG/1
20 TABLET, COATED ORAL DAILY
Qty: 90 TABLET | Refills: 3 | Status: SHIPPED | OUTPATIENT
Start: 2020-08-31 | End: 2022-03-22 | Stop reason: SDUPTHER

## 2020-08-31 NOTE — TELEPHONE ENCOUNTER
Patient called for medication refill      ROSUVASTATIN 20 mg  1 pill daily  #90 with refills    [facility]

## 2020-10-07 ENCOUNTER — APPOINTMENT (OUTPATIENT)
Dept: RADIOLOGY | Facility: AMBULARY SURGERY CENTER | Age: 75
End: 2020-10-07
Attending: SURGERY
Payer: COMMERCIAL

## 2020-10-07 ENCOUNTER — OFFICE VISIT (OUTPATIENT)
Dept: OBGYN CLINIC | Facility: CLINIC | Age: 75
End: 2020-10-07
Payer: COMMERCIAL

## 2020-10-07 VITALS
BODY MASS INDEX: 30.63 KG/M2 | WEIGHT: 156 LBS | HEIGHT: 60 IN | HEART RATE: 60 BPM | SYSTOLIC BLOOD PRESSURE: 182 MMHG | DIASTOLIC BLOOD PRESSURE: 82 MMHG

## 2020-10-07 DIAGNOSIS — M79.642 LEFT HAND PAIN: ICD-10-CM

## 2020-10-07 DIAGNOSIS — M79.641 RIGHT HAND PAIN: ICD-10-CM

## 2020-10-07 DIAGNOSIS — M65.332 TRIGGER MIDDLE FINGER OF LEFT HAND: ICD-10-CM

## 2020-10-07 DIAGNOSIS — M65.341 TRIGGER RING FINGER OF RIGHT HAND: ICD-10-CM

## 2020-10-07 DIAGNOSIS — M79.642 LEFT HAND PAIN: Primary | ICD-10-CM

## 2020-10-07 PROCEDURE — 99214 OFFICE O/P EST MOD 30 MIN: CPT | Performed by: SURGERY

## 2020-10-07 PROCEDURE — 3079F DIAST BP 80-89 MM HG: CPT | Performed by: SURGERY

## 2020-10-07 PROCEDURE — 20550 NJX 1 TENDON SHEATH/LIGAMENT: CPT | Performed by: SURGERY

## 2020-10-07 PROCEDURE — 1036F TOBACCO NON-USER: CPT | Performed by: SURGERY

## 2020-10-07 PROCEDURE — 73130 X-RAY EXAM OF HAND: CPT

## 2020-10-07 PROCEDURE — 1160F RVW MEDS BY RX/DR IN RCRD: CPT | Performed by: SURGERY

## 2020-10-07 RX ORDER — METHYLPREDNISOLONE 4 MG/1
TABLET ORAL
Qty: 1 EACH | Refills: 1 | Status: SHIPPED | OUTPATIENT
Start: 2020-10-07 | End: 2020-11-20

## 2020-10-07 RX ORDER — MELOXICAM 15 MG/1
15 TABLET ORAL DAILY
Qty: 30 TABLET | Refills: 0 | Status: SHIPPED | OUTPATIENT
Start: 2020-10-07 | End: 2020-10-08

## 2020-10-07 RX ORDER — BETAMETHASONE SODIUM PHOSPHATE AND BETAMETHASONE ACETATE 3; 3 MG/ML; MG/ML
3 INJECTION, SUSPENSION INTRA-ARTICULAR; INTRALESIONAL; INTRAMUSCULAR; SOFT TISSUE
Status: COMPLETED | OUTPATIENT
Start: 2020-10-07 | End: 2020-10-07

## 2020-10-07 RX ORDER — LIDOCAINE HYDROCHLORIDE 10 MG/ML
1 INJECTION, SOLUTION INFILTRATION; PERINEURAL
Status: COMPLETED | OUTPATIENT
Start: 2020-10-07 | End: 2020-10-07

## 2020-10-07 RX ADMIN — LIDOCAINE HYDROCHLORIDE 1 ML: 10 INJECTION, SOLUTION INFILTRATION; PERINEURAL at 11:59

## 2020-10-07 RX ADMIN — BETAMETHASONE SODIUM PHOSPHATE AND BETAMETHASONE ACETATE 3 MG: 3; 3 INJECTION, SUSPENSION INTRA-ARTICULAR; INTRALESIONAL; INTRAMUSCULAR; SOFT TISSUE at 11:59

## 2020-10-07 RX ADMIN — BETAMETHASONE SODIUM PHOSPHATE AND BETAMETHASONE ACETATE 3 MG: 3; 3 INJECTION, SUSPENSION INTRA-ARTICULAR; INTRALESIONAL; INTRAMUSCULAR; SOFT TISSUE at 12:00

## 2020-10-07 RX ADMIN — LIDOCAINE HYDROCHLORIDE 1 ML: 10 INJECTION, SOLUTION INFILTRATION; PERINEURAL at 12:00

## 2020-10-14 ENCOUNTER — EVALUATION (OUTPATIENT)
Dept: OCCUPATIONAL THERAPY | Facility: MEDICAL CENTER | Age: 75
End: 2020-10-14
Payer: COMMERCIAL

## 2020-10-14 DIAGNOSIS — M65.332 TRIGGER MIDDLE FINGER OF LEFT HAND: ICD-10-CM

## 2020-10-14 DIAGNOSIS — M65.341 TRIGGER RING FINGER OF RIGHT HAND: ICD-10-CM

## 2020-10-14 PROCEDURE — 97140 MANUAL THERAPY 1/> REGIONS: CPT

## 2020-10-14 PROCEDURE — 97165 OT EVAL LOW COMPLEX 30 MIN: CPT

## 2020-10-19 ENCOUNTER — OFFICE VISIT (OUTPATIENT)
Dept: OCCUPATIONAL THERAPY | Facility: MEDICAL CENTER | Age: 75
End: 2020-10-19
Payer: COMMERCIAL

## 2020-10-19 DIAGNOSIS — M65.341 TRIGGER RING FINGER OF RIGHT HAND: Primary | ICD-10-CM

## 2020-10-19 DIAGNOSIS — M65.332 TRIGGER MIDDLE FINGER OF LEFT HAND: ICD-10-CM

## 2020-10-19 PROCEDURE — 97110 THERAPEUTIC EXERCISES: CPT

## 2020-10-22 ENCOUNTER — OFFICE VISIT (OUTPATIENT)
Dept: OCCUPATIONAL THERAPY | Facility: MEDICAL CENTER | Age: 75
End: 2020-10-22
Payer: COMMERCIAL

## 2020-10-22 DIAGNOSIS — M65.332 TRIGGER MIDDLE FINGER OF LEFT HAND: ICD-10-CM

## 2020-10-22 DIAGNOSIS — M65.341 TRIGGER RING FINGER OF RIGHT HAND: Primary | ICD-10-CM

## 2020-10-22 PROCEDURE — 97110 THERAPEUTIC EXERCISES: CPT

## 2020-10-22 PROCEDURE — 97140 MANUAL THERAPY 1/> REGIONS: CPT

## 2020-10-27 ENCOUNTER — OFFICE VISIT (OUTPATIENT)
Dept: OCCUPATIONAL THERAPY | Facility: MEDICAL CENTER | Age: 75
End: 2020-10-27
Payer: COMMERCIAL

## 2020-10-27 DIAGNOSIS — M65.341 TRIGGER RING FINGER OF RIGHT HAND: Primary | ICD-10-CM

## 2020-10-27 DIAGNOSIS — M65.332 TRIGGER MIDDLE FINGER OF LEFT HAND: ICD-10-CM

## 2020-10-27 PROCEDURE — 97140 MANUAL THERAPY 1/> REGIONS: CPT

## 2020-10-27 PROCEDURE — 97110 THERAPEUTIC EXERCISES: CPT

## 2020-10-29 ENCOUNTER — OFFICE VISIT (OUTPATIENT)
Dept: OCCUPATIONAL THERAPY | Facility: MEDICAL CENTER | Age: 75
End: 2020-10-29
Payer: COMMERCIAL

## 2020-10-29 DIAGNOSIS — I10 ESSENTIAL HYPERTENSION: ICD-10-CM

## 2020-10-29 DIAGNOSIS — M65.341 TRIGGER RING FINGER OF RIGHT HAND: Primary | ICD-10-CM

## 2020-10-29 DIAGNOSIS — M65.332 TRIGGER MIDDLE FINGER OF LEFT HAND: ICD-10-CM

## 2020-10-29 PROCEDURE — 97140 MANUAL THERAPY 1/> REGIONS: CPT

## 2020-10-29 PROCEDURE — 97110 THERAPEUTIC EXERCISES: CPT

## 2020-10-30 RX ORDER — LISINOPRIL 10 MG/1
TABLET ORAL
Qty: 90 TABLET | Refills: 3 | Status: SHIPPED | OUTPATIENT
Start: 2020-10-30 | End: 2021-12-20 | Stop reason: SDUPTHER

## 2020-11-02 ENCOUNTER — TELEPHONE (OUTPATIENT)
Dept: OBGYN CLINIC | Facility: HOSPITAL | Age: 75
End: 2020-11-02

## 2020-11-03 ENCOUNTER — EVALUATION (OUTPATIENT)
Dept: OCCUPATIONAL THERAPY | Facility: CLINIC | Age: 75
End: 2020-11-03
Payer: COMMERCIAL

## 2020-11-03 DIAGNOSIS — M65.341 TRIGGER RING FINGER OF RIGHT HAND: Primary | ICD-10-CM

## 2020-11-03 DIAGNOSIS — M65.332 TRIGGER MIDDLE FINGER OF LEFT HAND: ICD-10-CM

## 2020-11-03 PROCEDURE — 97110 THERAPEUTIC EXERCISES: CPT | Performed by: OCCUPATIONAL THERAPIST

## 2020-11-03 PROCEDURE — 97140 MANUAL THERAPY 1/> REGIONS: CPT | Performed by: OCCUPATIONAL THERAPIST

## 2020-11-05 ENCOUNTER — HOSPITAL ENCOUNTER (OUTPATIENT)
Dept: MAMMOGRAPHY | Facility: HOSPITAL | Age: 75
Discharge: HOME | End: 2020-11-05
Payer: COMMERCIAL

## 2020-11-05 ENCOUNTER — OFFICE VISIT (OUTPATIENT)
Dept: OCCUPATIONAL THERAPY | Facility: CLINIC | Age: 75
End: 2020-11-05
Payer: COMMERCIAL

## 2020-11-05 VITALS — BODY MASS INDEX: 30.63 KG/M2 | WEIGHT: 156 LBS | HEIGHT: 60 IN

## 2020-11-05 DIAGNOSIS — Z12.39 SCREENING FOR BREAST CANCER: ICD-10-CM

## 2020-11-05 DIAGNOSIS — M65.341 TRIGGER RING FINGER OF RIGHT HAND: Primary | ICD-10-CM

## 2020-11-05 DIAGNOSIS — M65.332 TRIGGER MIDDLE FINGER OF LEFT HAND: ICD-10-CM

## 2020-11-05 PROCEDURE — 77067 SCR MAMMO BI INCL CAD: CPT

## 2020-11-05 PROCEDURE — 77063 BREAST TOMOSYNTHESIS BI: CPT

## 2020-11-05 PROCEDURE — 97110 THERAPEUTIC EXERCISES: CPT | Performed by: OCCUPATIONAL THERAPIST

## 2020-11-05 PROCEDURE — 97140 MANUAL THERAPY 1/> REGIONS: CPT | Performed by: OCCUPATIONAL THERAPIST

## 2020-11-09 DIAGNOSIS — M85.89 OSTEOPENIA OF MULTIPLE SITES: ICD-10-CM

## 2020-11-10 ENCOUNTER — OFFICE VISIT (OUTPATIENT)
Dept: OCCUPATIONAL THERAPY | Facility: CLINIC | Age: 75
End: 2020-11-10
Payer: COMMERCIAL

## 2020-11-10 DIAGNOSIS — M65.341 TRIGGER RING FINGER OF RIGHT HAND: Primary | ICD-10-CM

## 2020-11-10 DIAGNOSIS — M65.332 TRIGGER MIDDLE FINGER OF LEFT HAND: ICD-10-CM

## 2020-11-10 PROCEDURE — 97140 MANUAL THERAPY 1/> REGIONS: CPT

## 2020-11-10 PROCEDURE — 97110 THERAPEUTIC EXERCISES: CPT

## 2020-11-10 RX ORDER — ALENDRONATE SODIUM 70 MG/1
TABLET ORAL
Qty: 12 TABLET | Refills: 3 | Status: SHIPPED | OUTPATIENT
Start: 2020-11-10 | End: 2021-12-16 | Stop reason: SDUPTHER

## 2020-11-11 DIAGNOSIS — M65.332 TRIGGER MIDDLE FINGER OF LEFT HAND: ICD-10-CM

## 2020-11-11 DIAGNOSIS — M65.341 TRIGGER RING FINGER OF RIGHT HAND: Primary | ICD-10-CM

## 2020-11-12 ENCOUNTER — OFFICE VISIT (OUTPATIENT)
Dept: OCCUPATIONAL THERAPY | Facility: CLINIC | Age: 75
End: 2020-11-12
Payer: COMMERCIAL

## 2020-11-12 DIAGNOSIS — M65.341 TRIGGER RING FINGER OF RIGHT HAND: Primary | ICD-10-CM

## 2020-11-12 DIAGNOSIS — M65.332 TRIGGER MIDDLE FINGER OF LEFT HAND: ICD-10-CM

## 2020-11-12 PROCEDURE — 97140 MANUAL THERAPY 1/> REGIONS: CPT | Performed by: OCCUPATIONAL THERAPIST

## 2020-11-12 PROCEDURE — 97110 THERAPEUTIC EXERCISES: CPT | Performed by: OCCUPATIONAL THERAPIST

## 2020-11-16 ENCOUNTER — HOSPITAL ENCOUNTER (OUTPATIENT)
Dept: MAMMOGRAPHY | Facility: CLINIC | Age: 75
Discharge: HOME | End: 2020-11-16
Payer: COMMERCIAL

## 2020-11-16 ENCOUNTER — HOSPITAL ENCOUNTER (OUTPATIENT)
Dept: ULTRASOUND IMAGING | Facility: CLINIC | Age: 75
Discharge: HOME | End: 2020-11-16
Payer: COMMERCIAL

## 2020-11-16 ENCOUNTER — TRANSCRIBE ORDERS (OUTPATIENT)
Dept: MAMMOGRAPHY | Facility: CLINIC | Age: 75
End: 2020-11-16

## 2020-11-16 VITALS — TEMPERATURE: 96.1 F | HEIGHT: 61 IN | BODY MASS INDEX: 29.45 KG/M2 | WEIGHT: 156 LBS

## 2020-11-16 DIAGNOSIS — R92.8 ABNORMAL FINDINGS ON DIAGNOSTIC IMAGING OF BREAST: Primary | ICD-10-CM

## 2020-11-16 DIAGNOSIS — R92.8 ABNORMAL MAMMOGRAM: ICD-10-CM

## 2020-11-16 PROCEDURE — 76642 ULTRASOUND BREAST LIMITED: CPT

## 2020-11-16 PROCEDURE — 77065 DX MAMMO INCL CAD UNI: CPT

## 2020-11-16 PROCEDURE — G0279 TOMOSYNTHESIS, MAMMO: HCPCS

## 2020-11-17 ENCOUNTER — OFFICE VISIT (OUTPATIENT)
Dept: OCCUPATIONAL THERAPY | Facility: CLINIC | Age: 75
End: 2020-11-17
Payer: COMMERCIAL

## 2020-11-17 DIAGNOSIS — M65.341 TRIGGER RING FINGER OF RIGHT HAND: Primary | ICD-10-CM

## 2020-11-17 DIAGNOSIS — M65.332 TRIGGER MIDDLE FINGER OF LEFT HAND: ICD-10-CM

## 2020-11-17 PROCEDURE — 97110 THERAPEUTIC EXERCISES: CPT | Performed by: OCCUPATIONAL THERAPIST

## 2020-11-17 PROCEDURE — 97140 MANUAL THERAPY 1/> REGIONS: CPT | Performed by: OCCUPATIONAL THERAPIST

## 2020-11-18 ENCOUNTER — LAB (OUTPATIENT)
Dept: LAB | Facility: CLINIC | Age: 75
End: 2020-11-18
Payer: COMMERCIAL

## 2020-11-18 ENCOUNTER — OFFICE VISIT (OUTPATIENT)
Dept: OBGYN CLINIC | Facility: CLINIC | Age: 75
End: 2020-11-18
Payer: COMMERCIAL

## 2020-11-18 VITALS — TEMPERATURE: 96.7 F | HEART RATE: 60 BPM | DIASTOLIC BLOOD PRESSURE: 80 MMHG | SYSTOLIC BLOOD PRESSURE: 193 MMHG

## 2020-11-18 DIAGNOSIS — Z01.812 ENCOUNTER FOR PRE-OPERATIVE LABORATORY TESTING: ICD-10-CM

## 2020-11-18 DIAGNOSIS — M65.341 TRIGGER RING FINGER OF RIGHT HAND: ICD-10-CM

## 2020-11-18 DIAGNOSIS — M19.041 ARTHRITIS OF FINGER OF BOTH HANDS: ICD-10-CM

## 2020-11-18 DIAGNOSIS — M65.332 TRIGGER MIDDLE FINGER OF LEFT HAND: ICD-10-CM

## 2020-11-18 DIAGNOSIS — M65.332 TRIGGER MIDDLE FINGER OF LEFT HAND: Primary | ICD-10-CM

## 2020-11-18 DIAGNOSIS — M19.042 ARTHRITIS OF FINGER OF BOTH HANDS: ICD-10-CM

## 2020-11-18 LAB
ANION GAP SERPL CALCULATED.3IONS-SCNC: 7 MMOL/L (ref 4–13)
ATRIAL RATE: 56 BPM
BUN SERPL-MCNC: 11 MG/DL (ref 5–25)
CALCIUM SERPL-MCNC: 8.6 MG/DL (ref 8.3–10.1)
CHLORIDE SERPL-SCNC: 108 MMOL/L (ref 100–108)
CO2 SERPL-SCNC: 28 MMOL/L (ref 21–32)
CREAT SERPL-MCNC: 0.89 MG/DL (ref 0.6–1.3)
GFR SERPL CREATININE-BSD FRML MDRD: 65 ML/MIN/1.73SQ M
GLUCOSE P FAST SERPL-MCNC: 97 MG/DL (ref 65–99)
P AXIS: 71 DEGREES
POTASSIUM SERPL-SCNC: 4.3 MMOL/L (ref 3.5–5.3)
PR INTERVAL: 164 MS
QRS AXIS: 75 DEGREES
QRSD INTERVAL: 86 MS
QT INTERVAL: 472 MS
QTC INTERVAL: 455 MS
SODIUM SERPL-SCNC: 143 MMOL/L (ref 136–145)
T WAVE AXIS: 68 DEGREES
VENTRICULAR RATE: 56 BPM

## 2020-11-18 PROCEDURE — 80048 BASIC METABOLIC PNL TOTAL CA: CPT

## 2020-11-18 PROCEDURE — 3079F DIAST BP 80-89 MM HG: CPT | Performed by: SURGERY

## 2020-11-18 PROCEDURE — 3077F SYST BP >= 140 MM HG: CPT | Performed by: SURGERY

## 2020-11-18 PROCEDURE — 1036F TOBACCO NON-USER: CPT | Performed by: SURGERY

## 2020-11-18 PROCEDURE — 93010 ELECTROCARDIOGRAM REPORT: CPT | Performed by: INTERNAL MEDICINE

## 2020-11-18 PROCEDURE — 93005 ELECTROCARDIOGRAM TRACING: CPT

## 2020-11-18 PROCEDURE — 99214 OFFICE O/P EST MOD 30 MIN: CPT | Performed by: SURGERY

## 2020-11-18 PROCEDURE — 36415 COLL VENOUS BLD VENIPUNCTURE: CPT

## 2020-11-18 PROCEDURE — 1160F RVW MEDS BY RX/DR IN RCRD: CPT | Performed by: SURGERY

## 2020-11-19 ENCOUNTER — OFFICE VISIT (OUTPATIENT)
Dept: OCCUPATIONAL THERAPY | Facility: CLINIC | Age: 75
End: 2020-11-19
Payer: COMMERCIAL

## 2020-11-19 DIAGNOSIS — M65.341 TRIGGER RING FINGER OF RIGHT HAND: Primary | ICD-10-CM

## 2020-11-19 DIAGNOSIS — M65.332 TRIGGER MIDDLE FINGER OF LEFT HAND: ICD-10-CM

## 2020-11-19 PROCEDURE — 97140 MANUAL THERAPY 1/> REGIONS: CPT | Performed by: OCCUPATIONAL THERAPIST

## 2020-11-19 PROCEDURE — 97110 THERAPEUTIC EXERCISES: CPT | Performed by: OCCUPATIONAL THERAPIST

## 2020-11-29 ENCOUNTER — ANESTHESIA EVENT (OUTPATIENT)
Dept: PERIOP | Facility: HOSPITAL | Age: 75
End: 2020-11-29
Payer: COMMERCIAL

## 2020-11-30 ENCOUNTER — ANESTHESIA (OUTPATIENT)
Dept: PERIOP | Facility: HOSPITAL | Age: 75
End: 2020-11-30
Payer: COMMERCIAL

## 2020-11-30 ENCOUNTER — HOSPITAL ENCOUNTER (OUTPATIENT)
Facility: HOSPITAL | Age: 75
Setting detail: OUTPATIENT SURGERY
Discharge: HOME | End: 2020-11-30
Attending: SURGERY | Admitting: SURGERY
Payer: COMMERCIAL

## 2020-11-30 VITALS
SYSTOLIC BLOOD PRESSURE: 150 MMHG | HEART RATE: 75 BPM | OXYGEN SATURATION: 96 % | DIASTOLIC BLOOD PRESSURE: 74 MMHG | BODY MASS INDEX: 30.63 KG/M2 | HEIGHT: 60 IN | RESPIRATION RATE: 18 BRPM | TEMPERATURE: 97.6 F | WEIGHT: 156 LBS

## 2020-11-30 VITALS — HEART RATE: 82 BPM

## 2020-11-30 DIAGNOSIS — Z47.89 AFTERCARE FOLLOWING SURGERY OF THE MUSCULOSKELETAL SYSTEM: ICD-10-CM

## 2020-11-30 DIAGNOSIS — M65.341 TRIGGER RING FINGER OF RIGHT HAND: ICD-10-CM

## 2020-11-30 DIAGNOSIS — M65.332 TRIGGER MIDDLE FINGER OF LEFT HAND: Primary | ICD-10-CM

## 2020-11-30 PROCEDURE — 20550 NJX 1 TENDON SHEATH/LIGAMENT: CPT | Performed by: SURGERY

## 2020-11-30 PROCEDURE — 26055 INCISE FINGER TENDON SHEATH: CPT | Performed by: SURGERY

## 2020-11-30 PROCEDURE — 26055 INCISE FINGER TENDON SHEATH: CPT | Performed by: PHYSICIAN ASSISTANT

## 2020-11-30 RX ORDER — HYDROCODONE BITARTRATE AND ACETAMINOPHEN 5; 325 MG/1; MG/1
1 TABLET ORAL EVERY 6 HOURS PRN
Qty: 5 TABLET | Refills: 0 | Status: SHIPPED | OUTPATIENT
Start: 2020-11-30 | End: 2020-12-10

## 2020-11-30 RX ORDER — SODIUM CHLORIDE, SODIUM LACTATE, POTASSIUM CHLORIDE, CALCIUM CHLORIDE 600; 310; 30; 20 MG/100ML; MG/100ML; MG/100ML; MG/100ML
INJECTION, SOLUTION INTRAVENOUS CONTINUOUS PRN
Status: DISCONTINUED | OUTPATIENT
Start: 2020-11-30 | End: 2020-11-30

## 2020-11-30 RX ORDER — HYDROCODONE BITARTRATE AND ACETAMINOPHEN 5; 325 MG/1; MG/1
1 TABLET ORAL EVERY 6 HOURS PRN
Status: DISCONTINUED | OUTPATIENT
Start: 2020-11-30 | End: 2020-11-30 | Stop reason: HOSPADM

## 2020-11-30 RX ORDER — PROPOFOL 10 MG/ML
INJECTION, EMULSION INTRAVENOUS AS NEEDED
Status: DISCONTINUED | OUTPATIENT
Start: 2020-11-30 | End: 2020-11-30

## 2020-11-30 RX ORDER — LIDOCAINE HYDROCHLORIDE 10 MG/ML
INJECTION, SOLUTION EPIDURAL; INFILTRATION; INTRACAUDAL; PERINEURAL AS NEEDED
Status: DISCONTINUED | OUTPATIENT
Start: 2020-11-30 | End: 2020-11-30 | Stop reason: HOSPADM

## 2020-11-30 RX ORDER — CLINDAMYCIN PHOSPHATE 900 MG/50ML
900 INJECTION INTRAVENOUS ONCE
Status: COMPLETED | OUTPATIENT
Start: 2020-11-30 | End: 2020-11-30

## 2020-11-30 RX ORDER — DEXAMETHASONE SODIUM PHOSPHATE 4 MG/ML
INJECTION, SOLUTION INTRA-ARTICULAR; INTRALESIONAL; INTRAMUSCULAR; INTRAVENOUS; SOFT TISSUE AS NEEDED
Status: DISCONTINUED | OUTPATIENT
Start: 2020-11-30 | End: 2020-11-30 | Stop reason: HOSPADM

## 2020-11-30 RX ORDER — PROPOFOL 10 MG/ML
INJECTION, EMULSION INTRAVENOUS CONTINUOUS PRN
Status: DISCONTINUED | OUTPATIENT
Start: 2020-11-30 | End: 2020-11-30

## 2020-11-30 RX ORDER — ONDANSETRON 2 MG/ML
INJECTION INTRAMUSCULAR; INTRAVENOUS AS NEEDED
Status: DISCONTINUED | OUTPATIENT
Start: 2020-11-30 | End: 2020-11-30

## 2020-11-30 RX ORDER — EPHEDRINE SULFATE 50 MG/ML
INJECTION INTRAVENOUS AS NEEDED
Status: DISCONTINUED | OUTPATIENT
Start: 2020-11-30 | End: 2020-11-30

## 2020-11-30 RX ORDER — MIDAZOLAM HYDROCHLORIDE 2 MG/2ML
INJECTION, SOLUTION INTRAMUSCULAR; INTRAVENOUS AS NEEDED
Status: DISCONTINUED | OUTPATIENT
Start: 2020-11-30 | End: 2020-11-30

## 2020-11-30 RX ORDER — FENTANYL CITRATE/PF 50 MCG/ML
50 SYRINGE (ML) INJECTION
Status: DISCONTINUED | OUTPATIENT
Start: 2020-11-30 | End: 2020-11-30 | Stop reason: HOSPADM

## 2020-11-30 RX ORDER — FENTANYL CITRATE 50 UG/ML
INJECTION, SOLUTION INTRAMUSCULAR; INTRAVENOUS AS NEEDED
Status: DISCONTINUED | OUTPATIENT
Start: 2020-11-30 | End: 2020-11-30

## 2020-11-30 RX ORDER — ONDANSETRON 2 MG/ML
4 INJECTION INTRAMUSCULAR; INTRAVENOUS ONCE AS NEEDED
Status: DISCONTINUED | OUTPATIENT
Start: 2020-11-30 | End: 2020-11-30 | Stop reason: HOSPADM

## 2020-11-30 RX ORDER — HYDROCODONE BITARTRATE AND ACETAMINOPHEN 5; 325 MG/1; MG/1
1 TABLET ORAL EVERY 6 HOURS PRN
Qty: 5 TABLET | Refills: 0 | Status: SHIPPED | OUTPATIENT
Start: 2020-11-30 | End: 2020-11-30 | Stop reason: HOSPADM

## 2020-11-30 RX ORDER — HYDROMORPHONE HCL/PF 1 MG/ML
0.5 SYRINGE (ML) INJECTION
Status: DISCONTINUED | OUTPATIENT
Start: 2020-11-30 | End: 2020-11-30 | Stop reason: HOSPADM

## 2020-11-30 RX ORDER — GLYCOPYRROLATE 0.2 MG/ML
INJECTION INTRAMUSCULAR; INTRAVENOUS AS NEEDED
Status: DISCONTINUED | OUTPATIENT
Start: 2020-11-30 | End: 2020-11-30

## 2020-11-30 RX ADMIN — PROPOFOL 100 MG: 10 INJECTION, EMULSION INTRAVENOUS at 09:20

## 2020-11-30 RX ADMIN — FENTANYL CITRATE 25 MCG: 50 INJECTION INTRAMUSCULAR; INTRAVENOUS at 09:20

## 2020-11-30 RX ADMIN — PROPOFOL 20 MG: 10 INJECTION, EMULSION INTRAVENOUS at 09:37

## 2020-11-30 RX ADMIN — EPHEDRINE SULFATE 5 MG: 50 INJECTION, SOLUTION INTRAVENOUS at 09:25

## 2020-11-30 RX ADMIN — MIDAZOLAM HYDROCHLORIDE 1 MG: 1 INJECTION, SOLUTION INTRAMUSCULAR; INTRAVENOUS at 09:18

## 2020-11-30 RX ADMIN — PROPOFOL 50 MCG/KG/MIN: 10 INJECTION, EMULSION INTRAVENOUS at 09:26

## 2020-11-30 RX ADMIN — SODIUM CHLORIDE, SODIUM LACTATE, POTASSIUM CHLORIDE, AND CALCIUM CHLORIDE: .6; .31; .03; .02 INJECTION, SOLUTION INTRAVENOUS at 09:18

## 2020-11-30 RX ADMIN — EPHEDRINE SULFATE 5 MG: 50 INJECTION, SOLUTION INTRAVENOUS at 09:34

## 2020-11-30 RX ADMIN — CLINDAMYCIN PHOSPHATE 900 MG: 18 INJECTION, SOLUTION INTRAMUSCULAR; INTRAVENOUS at 09:12

## 2020-11-30 RX ADMIN — GLYCOPYRROLATE 0.2 MG: 0.2 INJECTION, SOLUTION INTRAMUSCULAR; INTRAVENOUS at 09:18

## 2020-11-30 RX ADMIN — PROPOFOL 20 MG: 10 INJECTION, EMULSION INTRAVENOUS at 09:24

## 2020-11-30 RX ADMIN — EPHEDRINE SULFATE 5 MG: 50 INJECTION, SOLUTION INTRAVENOUS at 09:37

## 2020-11-30 RX ADMIN — ONDANSETRON 4 MG: 2 INJECTION INTRAMUSCULAR; INTRAVENOUS at 09:18

## 2020-12-04 ENCOUNTER — OFFICE VISIT (OUTPATIENT)
Dept: OCCUPATIONAL THERAPY | Facility: CLINIC | Age: 75
End: 2020-12-04
Payer: COMMERCIAL

## 2020-12-04 DIAGNOSIS — M65.332 TRIGGER MIDDLE FINGER OF LEFT HAND: Primary | ICD-10-CM

## 2020-12-04 DIAGNOSIS — Z47.89 AFTERCARE FOLLOWING SURGERY OF THE MUSCULOSKELETAL SYSTEM: ICD-10-CM

## 2020-12-04 DIAGNOSIS — Z47.89 AFTERCARE FOLLOWING SURGERY OF THE MUSCULOSKELETAL SYSTEM: Primary | ICD-10-CM

## 2020-12-04 PROCEDURE — 97110 THERAPEUTIC EXERCISES: CPT | Performed by: OCCUPATIONAL THERAPIST

## 2020-12-04 PROCEDURE — 97168 OT RE-EVAL EST PLAN CARE: CPT | Performed by: OCCUPATIONAL THERAPIST

## 2020-12-14 ENCOUNTER — OFFICE VISIT (OUTPATIENT)
Dept: OBGYN CLINIC | Facility: CLINIC | Age: 75
End: 2020-12-14

## 2020-12-14 VITALS
DIASTOLIC BLOOD PRESSURE: 84 MMHG | BODY MASS INDEX: 30.63 KG/M2 | WEIGHT: 156 LBS | HEART RATE: 78 BPM | SYSTOLIC BLOOD PRESSURE: 180 MMHG | HEIGHT: 60 IN

## 2020-12-14 DIAGNOSIS — Z98.890 S/P TRIGGER FINGER RELEASE: Primary | ICD-10-CM

## 2020-12-14 PROCEDURE — 99024 POSTOP FOLLOW-UP VISIT: CPT | Performed by: SURGERY

## 2020-12-14 PROCEDURE — 3008F BODY MASS INDEX DOCD: CPT | Performed by: SURGERY

## 2021-01-11 ENCOUNTER — OFFICE VISIT (OUTPATIENT)
Dept: OBGYN CLINIC | Facility: CLINIC | Age: 76
End: 2021-01-11

## 2021-01-11 VITALS
HEART RATE: 66 BPM | WEIGHT: 158 LBS | HEIGHT: 60 IN | BODY MASS INDEX: 31.02 KG/M2 | SYSTOLIC BLOOD PRESSURE: 158 MMHG | DIASTOLIC BLOOD PRESSURE: 79 MMHG

## 2021-01-11 DIAGNOSIS — Z98.890 S/P TRIGGER FINGER RELEASE: Primary | ICD-10-CM

## 2021-01-11 PROCEDURE — 99024 POSTOP FOLLOW-UP VISIT: CPT | Performed by: SURGERY

## 2021-01-11 NOTE — PROGRESS NOTES
Assessment/Plan:  Patient ID: Wilfrid Gray 70 y o  female   Surgery: Long Trigger Finger Release - Left and Ring Trigger Finger Injection - Right  Date of Surgery: 11/30/2020    6 weeks s/p left long finger trigger finger release and right ring finger trigger finger CSI  Aggie Gueulaliakarson was shown place and hold exercises in the office today  She should be working on this in the mean time until she is been by OT tomorrow  An updated OT script was provided today for ROM, stretching exercises, scar modalities and a scar pad for nighttime use  She is forming more scar tissue then I would like  Follow up in 6 weeks time for a ROM check  Follow up in 6 weeks time for a ROM check  Follow Up:  6  week(s)    To Do Next Visit:  ROM check       CHIEF COMPLAINT:  Chief Complaint   Patient presents with    Left Middle Finger - Post-op    Right Ring Finger - Post-op         SUBJECTIVE:  Wilfrid Gray is a 70y o  year old female who presents for follow up after Long Trigger Finger Release - Left and Ring Trigger Finger Injection - Right  Aggie Kevin states that her right ring finger has locked once following the second trigger finger CSI in the OR  Her right ring finger is livable at this time  She also states that she is upset that PT/OT did not call her to set up more appointments after they received approval from her insurance company as PT/OT did get the same letter as Aggie Kevin stating that she was approved for more therapy visits  Aggie Kevin will be going to therapy tomorrow         PHYSICAL EXAMINATION:  General: well developed and well nourished, alert, oriented times 3 and appears comfortable  Psychiatric: Normal    MUSCULOSKELETAL EXAMINATION:  Incision: healed  Surgery Site: normal, no evidence of infection   Range of Motion: full extension of the DIP, PIP and MCP joints, loose full composite fist   Neurovascular status: Neuro intact, good cap refill  Activity Restrictions: No restrictions      STUDIES REVIEWED:  No Studies to review      PROCEDURES PERFORMED:  Procedures  No Procedures performed today    Scribe Attestation    I,:  Yolette Shante am acting as a scribe while in the presence of the attending physician :       I,:  Yair Figueroa MD personally performed the services described in this documentation    as scribed in my presence :

## 2021-01-12 ENCOUNTER — EVALUATION (OUTPATIENT)
Dept: OCCUPATIONAL THERAPY | Facility: CLINIC | Age: 76
End: 2021-01-12
Payer: COMMERCIAL

## 2021-01-12 DIAGNOSIS — M65.332 TRIGGER MIDDLE FINGER OF LEFT HAND: Primary | ICD-10-CM

## 2021-01-12 PROCEDURE — 97140 MANUAL THERAPY 1/> REGIONS: CPT | Performed by: OCCUPATIONAL THERAPIST

## 2021-01-12 PROCEDURE — 97110 THERAPEUTIC EXERCISES: CPT | Performed by: OCCUPATIONAL THERAPIST

## 2021-01-12 NOTE — PROGRESS NOTES
Daily Note     Today's date: 2021  Patient name: Estrellita Esparza  : [birth year]  MRN: [id number]  Referring provider: Kasandra Blunt MD  Dx:   Encounter Diagnosis     ICD-10-CM    1  Trigger middle finger of left hand  M65 332                   Subjective: "I couldn't lift a pan"      Objective: See treatment diary below      Assessment:  See RE  Provided scar pad for night use      Plan: Continue per plan of care          Precautions: Universal, Post-op trigger finger release  HEP: Blocking, tge, reverse blocking, finger pushups, intrinsic and extrinsic stretch      Manuals             Scar mobs 15            Grade 2-3 joint mobs             PROM 10                         Neuro Re-Ed             Translation             Pinch [place]             Place and hold 10x            Pencil grasp 5x                                                   Ther Ex             Isometric grasp             Gross grarsp                                                                                           Ther Activity                                                                              Modalities

## 2021-01-12 NOTE — PROGRESS NOTES
OT Re-Evaluation     Today's date: 2021  Patient name: Jean-Paul Coates  : 2035  MRN: [id number]  Referring provider: Michelle Monet MD  Dx:   Encounter Diagnosis     ICD-10-CM    1  Trigger middle finger of left hand  M65 332                   Assessment  Assessment details:  Valencia Yuan presents with continued decreased AROM, weakness affecting her ADLs and IADLs with significant post-surgical scar formation  Goals  STG) Motion increased by 25% in 4-6 weeks PARTIALLY MET  STG) Strength/Motor skills improved by 25% in 4-6 weeks NOT MET  STG) SwellingEdema improved by 25% in 4-6 weeks MET    STG) AROM increased 25% in 4 weeks  STG) Grasp improved 25% in 4 weeks    LTG) ADL and IADL skills improved   LTG) Leisure skills improved    Patient Goals: To use my hand better    Goals, plan of care and treatment condition discussed with patient  Patient expresses their understanding and questions regarding these issues were addressed  Plan  Planned modality interventions: TENS, thermotherapy: hydrocollator packs and ultrasound  Planned therapy interventions: joint mobilization, manual therapy, Rodriguez taping, motor coordination training, orthotic fitting/training, therapeutic activities, therapeutic exercise, fine motor coordination training and functional ROM exercises  Frequency: 2x week  Duration in visits: 10        Subjective Evaluation    History of Present Illness  Mechanism of injury: Mechanism of injury: Valencia Yuan has a several month history of  LF B trigger finger; she has recently  underwent a L LF trigger finger release    Pain  Current pain ratin  At worst pain ratin    Hand dominance: right          Objective     Active Range of Motion     Left Wrist   Wrist flexion: 50 degrees   Wrist extension: 50 degrees     Left Digits    Flexion   Index     MCP: 56    PIP: 70    DIP: 40  Middle     MCP: 64    PIP: 64    DIP: 46  Ring     MCP: 64    PIP: 86    DIP: 42  Little     MCP: 70 PIP: 74    DIP: 42  Extension   Middle     MCP: 0    PIP: -4    DIP: -4    Strength/Myotome Testing     Left Wrist/Hand      (2nd hand position)     Trial 1: 17 4    Right Wrist/Hand      (2nd hand position)     Trial 1: 26 6    Tests     Left Wrist/Hand   Positive Bunnel-Littler, extrinsic flexor tightness and intrinsic muscle tightness       Swelling     Left Wrist/Hand   Middle     Proximal: 6 6 cm    Right Wrist/Hand   Middle     Proximal: 6 8 cm               Precautions: Universal, Post-op trigger finger release  HEP: Blocking, tge, reverse blocking, finger pushups, intrinsic and extrinsic stretch      Manuals             Scar mobs             Grade 2-3 joint mobs                                       Neuro Re-Ed             Translation             Pinch [place]                                                                              Ther Ex             Isometric grasp             Gross grarsp                                                                                           Ther Activity                                                                              Modalities

## 2021-01-15 ENCOUNTER — OFFICE VISIT (OUTPATIENT)
Dept: OCCUPATIONAL THERAPY | Facility: CLINIC | Age: 76
End: 2021-01-15
Payer: COMMERCIAL

## 2021-01-15 DIAGNOSIS — M65.332 TRIGGER MIDDLE FINGER OF LEFT HAND: Primary | ICD-10-CM

## 2021-01-15 PROCEDURE — 97112 NEUROMUSCULAR REEDUCATION: CPT

## 2021-01-15 PROCEDURE — 97110 THERAPEUTIC EXERCISES: CPT

## 2021-01-15 PROCEDURE — 97140 MANUAL THERAPY 1/> REGIONS: CPT

## 2021-01-15 NOTE — PROGRESS NOTES
Daily Note     Today's date: 1/15/2021  Patient name: Lucy Rodriguez  : [birth year]  MRN: [id number]  Referring provider: Rani Baumann MD  Dx:   Encounter Diagnosis     ICD-10-CM    1  Trigger middle finger of left hand  M65 332                   Subjective: It was not hurting as much last night and today with the exercises  Objective: See treatment diary below      Assessment:  Moderate finger stiffness pre-tx, but responds well to heat and stretch  Good tolerance of light resistive exercises  Plan: Continue per plan of care          Precautions: Universal, Post-op trigger finger release  HEP: Blocking, tge, reverse blocking, finger pushups, intrinsic and extrinsic stretch      Manuals 1/15            Scar mobs 5            Grade 2-3 joint mobs             PROM 10                         Neuro Re-Ed             Translation Key pegs 1x            Pinch [place] RR TT 2x            Place and hold 10x            Pencil grasp 2x                                                   Ther Ex             Isometric grasp             Gross grasp RPW pwr/cyl 30x            digigrip Red isol 30x/green comp 30x                                                                             Ther Activity                                                                              Modalities              5'

## 2021-01-19 ENCOUNTER — OFFICE VISIT (OUTPATIENT)
Dept: OCCUPATIONAL THERAPY | Facility: CLINIC | Age: 76
End: 2021-01-19
Payer: COMMERCIAL

## 2021-01-19 DIAGNOSIS — M65.332 TRIGGER MIDDLE FINGER OF LEFT HAND: Primary | ICD-10-CM

## 2021-01-19 PROCEDURE — 97110 THERAPEUTIC EXERCISES: CPT

## 2021-01-19 NOTE — PROGRESS NOTES
Daily Note     Today's date: 2021  Patient name: Hemal Palmer  :   MRN: [id number]  Referring provider: Dania Nicole MD  Dx:   Encounter Diagnosis     ICD-10-CM    1  Trigger middle finger of left hand  M65 332                   Subjective: This one is giving me a problem (R RF)  Objective: See treatment diary below      Assessment:  Mildly increased swelling and stiffness of R RF   L MF mod stiff, but improves with passive stretching  Scar is becoming softer  Plan: Continue per plan of care          Precautions: Universal, Post-op trigger finger release  HEP: Blocking, tge, reverse blocking, finger pushups, intrinsic and extrinsic stretch      Manuals 1/15 1/19           Scar mobs 5 5'           Grade 2-3 joint mobs             PROM 10 10'                        Neuro Re-Ed             Translation Key pegs 1x Key pegs 1x           Pinch [place] RR TT 2x RG TT 2x           Place and hold 10x            Pencil grasp 2x                                                   Ther Ex             Isometric grasp             Gross grasp RPW pwr/cyl 30x RPW pwr/cyl 30x           digigrip Red isol 30x/green comp 30x Red isol/green comp 30x                                                                            Ther Activity                                                                              Modalities              5' 5'

## 2021-01-21 ENCOUNTER — OFFICE VISIT (OUTPATIENT)
Dept: OCCUPATIONAL THERAPY | Facility: CLINIC | Age: 76
End: 2021-01-21
Payer: COMMERCIAL

## 2021-01-21 DIAGNOSIS — M65.332 TRIGGER MIDDLE FINGER OF LEFT HAND: Primary | ICD-10-CM

## 2021-01-21 PROCEDURE — 97110 THERAPEUTIC EXERCISES: CPT | Performed by: OCCUPATIONAL THERAPIST

## 2021-01-21 PROCEDURE — 97140 MANUAL THERAPY 1/> REGIONS: CPT | Performed by: OCCUPATIONAL THERAPIST

## 2021-01-21 PROCEDURE — 97530 THERAPEUTIC ACTIVITIES: CPT | Performed by: OCCUPATIONAL THERAPIST

## 2021-01-21 NOTE — PROGRESS NOTES
Daily Note     Today's date: 2021  Patient name: Estrellita Esparza  :   MRN: [id number]  Referring provider: Kasandra Blunt MD  Dx:   Encounter Diagnosis     ICD-10-CM    1  Trigger middle finger of left hand  M65 332                   Subjective: "It's getting better"      Objective: See treatment diary below      Assessment: AROM and activity tolerance, strength improving, minimal reported pain      Plan: Continue per plan of care        Precautions: Universal, Post-op trigger finger release  HEP: Blocking, tge, reverse blocking, finger pushups, intrinsic and extrinsic stretch      Manuals 1/15 1/19 1/21          Scar mobs 5 5' 5          Grade 2-3 joint mobs             PROM 10 10' 10                       Neuro Re-Ed             Translation Key pegs 1x Key pegs 1x Key pegs 1x          Pinch [place] RR TT 2x RG TT 2x RG TT 2x          Place and hold 10x  10x          Pencil grasp 2x            EDC   Off tabletoop x 15                                    Ther Ex             Isometric grasp             Gross grasp RPW pwr/cyl 30x RPW pwr/cyl 30x GPW 3x 10          digigrip Red isol 30x/green comp 30x Red isol/green comp 30x green isol/green comp 30x                                                                           Ther Activity                                                                              Modalities              5' 5' 5

## 2021-01-22 ENCOUNTER — APPOINTMENT (OUTPATIENT)
Dept: OCCUPATIONAL THERAPY | Facility: CLINIC | Age: 76
End: 2021-01-22
Payer: COMMERCIAL

## 2021-01-25 ENCOUNTER — OFFICE VISIT (OUTPATIENT)
Dept: OCCUPATIONAL THERAPY | Facility: CLINIC | Age: 76
End: 2021-01-25
Payer: COMMERCIAL

## 2021-01-25 ENCOUNTER — TELEPHONE (OUTPATIENT)
Dept: FAMILY MEDICINE CLINIC | Facility: CLINIC | Age: 76
End: 2021-01-25

## 2021-01-25 DIAGNOSIS — M65.332 TRIGGER MIDDLE FINGER OF LEFT HAND: Primary | ICD-10-CM

## 2021-01-25 PROCEDURE — 97110 THERAPEUTIC EXERCISES: CPT | Performed by: OCCUPATIONAL THERAPIST

## 2021-01-25 PROCEDURE — 97140 MANUAL THERAPY 1/> REGIONS: CPT | Performed by: OCCUPATIONAL THERAPIST

## 2021-01-25 NOTE — PROGRESS NOTES
Daily Note     Today's date: 2021  Patient name: Melvina Marquez  : [birth year]  MRN: [id number]  Referring provider: Thom Nunez MD  Dx:   Encounter Diagnosis     ICD-10-CM    1  Trigger middle finger of left hand  M65 332                   Subjective: "I think it's moving better"      Objective: See treatment diary below      Assessment: Still not full AROM but grossly improving; mild crepitus at A1      Plan: Continue per plan of care        Precautions: Universal, Post-op trigger finger release  HEP: Blocking, tge, reverse blocking, finger pushups, intrinsic and extrinsic stretch      Manuals 1/15 1/19 1/21 1/25         Scar mobs 5 5' 5 5         Grade 2-3 joint mobs             PROM 10 10' 10 10                      Neuro Re-Ed             Translation Key pegs 1x Key pegs 1x Key pegs 1x Key pegs 1x         Pinch [place] RR TT 2x RG TT 2x RG TT 2x RG TT 2x         Place and hold 10x  10x 10x         Pencil grasp 2x            EDC   Off tabletoop x 15 Off tabletop x 15                                   Ther Ex             Isometric grasp             Gross grasp RPW pwr/cyl 30x RPW pwr/cyl 30x GPW 3x 10 GPW 3x 10         digigrip Red isol 30x/green comp 30x Red isol/green comp 30x green isol/green comp 30x green isol/green comp 30x         Pencil grasp    4x                                                             Ther Activity                                                                              Modalities              5' 5' 5 5

## 2021-01-29 ENCOUNTER — OFFICE VISIT (OUTPATIENT)
Dept: OCCUPATIONAL THERAPY | Facility: CLINIC | Age: 76
End: 2021-01-29
Payer: COMMERCIAL

## 2021-01-29 DIAGNOSIS — M65.332 TRIGGER MIDDLE FINGER OF LEFT HAND: Primary | ICD-10-CM

## 2021-01-29 PROCEDURE — 97140 MANUAL THERAPY 1/> REGIONS: CPT | Performed by: OCCUPATIONAL THERAPIST

## 2021-01-29 PROCEDURE — 97110 THERAPEUTIC EXERCISES: CPT | Performed by: OCCUPATIONAL THERAPIST

## 2021-01-29 NOTE — PROGRESS NOTES
Daily Note     Today's date: 2021  Patient name: Sriram Rincon  :   MRN: [id number]  Referring provider: Lynn Munroe MD  Dx:   Encounter Diagnosis     ICD-10-CM    1  Trigger middle finger of left hand  M65 332                   Subjective: "Its loosening up"      Objective: See treatment diary below      Assessment: Good tolerance, AROM improving      Plan: Continue per plan of care        Precautions: Universal, Post-op trigger finger release  HEP: Blocking, tge, reverse blocking, finger pushups, intrinsic and extrinsic stretch      Manuals 1/15 1/19 1/21 1/25 1/29        Scar mobs 5 5' 5 5 5        Grade 2-3 joint mobs             PROM 10 10' 10 10 10                     Neuro Re-Ed             Translation Key pegs 1x Key pegs 1x Key pegs 1x Key pegs 1x Key pegs 1x        Pinch [place] RR TT 2x RG TT 2x RG TT 2x RG TT 2x GG TT x 2        Place and hold 10x  10x 10x 10x        Pencil grasp 2x            EDC   Off tabletoop x 15 Off tabletop x 15 Off tabletop x 15                                  Ther Ex             Isometric grasp             Gross grasp RPW pwr/cyl 30x RPW pwr/cyl 30x GPW 3x 10 GPW 3x 10 GPW 3x 10        digigrip Red isol 30x/green comp 30x Red isol/green comp 30x green isol/green comp 30x green isol/green comp 30x green isol/green comp 30x        Pencil grasp    4x 4x                                                            Ther Activity                                                                              Modalities              5' 5' 5 5 5

## 2021-02-01 ENCOUNTER — APPOINTMENT (OUTPATIENT)
Dept: OCCUPATIONAL THERAPY | Facility: CLINIC | Age: 76
End: 2021-02-01
Payer: COMMERCIAL

## 2021-02-03 ENCOUNTER — APPOINTMENT (OUTPATIENT)
Dept: OCCUPATIONAL THERAPY | Facility: CLINIC | Age: 76
End: 2021-02-03
Payer: COMMERCIAL

## 2021-02-03 NOTE — PROGRESS NOTES
Daily Note     Today's date: 2/3/2021  Patient name: Bernarda Gorman  :   MRN: [id number]  Referring provider: Ed Mcneil MD  Dx:   Encounter Diagnosis     ICD-10-CM    1  Trigger middle finger of left hand  M65 332                   Subjective: ***      Objective: See treatment diary below      Assessment: Tolerated treatment {Tolerated treatment :[id number]}   Patient {assessment:}      Plan: {PLAN:[id number]}     Precautions: Universal, Post-op trigger finger release  HEP: Blocking, tge, reverse blocking, finger pushups, intrinsic and extrinsic stretch      Manuals 1/15 1/19 1/21 1/25 1/29        Scar mobs 5 5' 5 5 5        Grade 2-3 joint mobs             PROM 10 10' 10 10 10                     Neuro Re-Ed             Translation Key pegs 1x Key pegs 1x Key pegs 1x Key pegs 1x Key pegs 1x        Pinch [place] RR TT 2x RG TT 2x RG TT 2x RG TT 2x GG TT x 2        Place and hold 10x  10x 10x 10x        Pencil grasp 2x            EDC   Off tabletoop x 15 Off tabletop x 15 Off tabletop x 15                                  Ther Ex             Isometric grasp             Gross grasp RPW pwr/cyl 30x RPW pwr/cyl 30x GPW 3x 10 GPW 3x 10 GPW 3x 10        digigrip Red isol 30x/green comp 30x Red isol/green comp 30x green isol/green comp 30x green isol/green comp 30x green isol/green comp 30x        Pencil grasp    4x 4x                                                            Ther Activity                                                                              Modalities             MH 5' 5' 5 5 5

## 2021-02-05 ENCOUNTER — OFFICE VISIT (OUTPATIENT)
Dept: OCCUPATIONAL THERAPY | Facility: CLINIC | Age: 76
End: 2021-02-05
Payer: COMMERCIAL

## 2021-02-05 DIAGNOSIS — M65.332 TRIGGER MIDDLE FINGER OF LEFT HAND: Primary | ICD-10-CM

## 2021-02-05 PROCEDURE — 97110 THERAPEUTIC EXERCISES: CPT | Performed by: OCCUPATIONAL THERAPIST

## 2021-02-05 PROCEDURE — 97140 MANUAL THERAPY 1/> REGIONS: CPT | Performed by: OCCUPATIONAL THERAPIST

## 2021-02-05 NOTE — PROGRESS NOTES
Daily Note     Today's date: 2021  Patient name: Jovan Holland  :   MRN: [id number]  Referring provider: Kaleigh Grande MD  Dx:   Encounter Diagnosis     ICD-10-CM    1  Trigger middle finger of left hand  M65 332                   Subjective: "It's coming!"      Objective: See treatment diary below      Assessment: Tolerated well, AROM continues to improve  Plan: Continue per plan of care        Precautions: Universal, Post-op trigger finger release  HEP: Blocking, tge, reverse blocking, finger pushups, intrinsic and extrinsic stretch      Manuals 1/15 1/19 1/21 1/25 1/29 2/5       Scar mobs 5 5' 5 5 5 5       Grade 2-3 joint mobs             PROM 10 10' 10 10 10 10                    Neuro Re-Ed             Translation Key pegs 1x Key pegs 1x Key pegs 1x Key pegs 1x Key pegs 1x Key pegs 1x       Pinch [place] RR TT 2x RG TT 2x RG TT 2x RG TT 2x GG TT x 2 GB TT x 2       Place and hold 10x  10x 10x 10x 10x       Pencil grasp 2x            EDC   Off tabletoop x 15 Off tabletop x 15 Off tabletop x 15 Off tabletop x 15                                 Ther Ex             Isometric grasp             Gross grasp RPW pwr/cyl 30x RPW pwr/cyl 30x GPW 3x 10 GPW 3x 10 GPW 3x 10 GPW 3x 10       digigrip Red isol 30x/green comp 30x Red isol/green comp 30x green isol/green comp 30x green isol/green comp 30x green isol/green comp 30x Blue iso 2x 8       Pencil grasp    4x 4x 4x                                                           Ther Activity                                                                              Modalities              5' 5' 5 5 5

## 2021-02-08 ENCOUNTER — OFFICE VISIT (OUTPATIENT)
Dept: OCCUPATIONAL THERAPY | Facility: CLINIC | Age: 76
End: 2021-02-08
Payer: COMMERCIAL

## 2021-02-08 DIAGNOSIS — M65.332 TRIGGER MIDDLE FINGER OF LEFT HAND: Primary | ICD-10-CM

## 2021-02-08 PROCEDURE — 97110 THERAPEUTIC EXERCISES: CPT

## 2021-02-08 NOTE — PROGRESS NOTES
Daily Note     Today's date: 2021  Patient name: Katiuska Singh  :   MRN: [id number]  Referring provider: Niurka Keyes MD  Dx:   Encounter Diagnosis     ICD-10-CM    1  Trigger middle finger of left hand  M65 332                   Subjective: I can tell it's getting better  It only hurts the first time I make a fist in the morning  Then it's better during the day  Objective: See treatment diary below    Assessment: Tolerated well  ROM is improving; less pain and stiffness with functional use during the day  Plan: Continue per plan of care        Precautions: Universal, Post-op trigger finger release  HEP: Blocking, tge, reverse blocking, finger pushups, intrinsic and extrinsic stretch      Manuals 1/15 1/19 1/21 1/25 1/29 2/5 2/8      Scar mobs 5 5' 5 5 5 5 5      Grade 2-3 joint mobs             PROM 10 10' 10 10 10 10 10                   Neuro Re-Ed             Translation Key pegs 1x Key pegs 1x Key pegs 1x Key pegs 1x Key pegs 1x Key pegs 1x Key pegs 1x      Pinch [place] RR TT 2x RG TT 2x RG TT 2x RG TT 2x GG TT x 2 GB TT x 2 GB TT 2x      Place and hold 10x  10x 10x 10x 10x 10x      Pencil grasp 2x            EDC   Off tabletoop x 15 Off tabletop x 15 Off tabletop x 15 Off tabletop x 15 Off tabletop x15                                Ther Ex             Isometric grasp             Gross grasp RPW pwr/cyl 30x RPW pwr/cyl 30x GPW 3x 10 GPW 3x 10 GPW 3x 10 GPW 3x 10 GPW 3x10      digigrip Red isol 30x/green comp 30x Red isol/green comp 30x green isol/green comp 30x green isol/green comp 30x green isol/green comp 30x Blue iso 2x 8 Blue isol & comp 2x8      Pencil grasp    4x 4x 4x 4x                                                          Ther Activity                                                                              Modalities              5' 5' 5 5 5  5'

## 2021-02-10 ENCOUNTER — EVALUATION (OUTPATIENT)
Dept: OCCUPATIONAL THERAPY | Facility: CLINIC | Age: 76
End: 2021-02-10
Payer: COMMERCIAL

## 2021-02-10 DIAGNOSIS — M65.332 TRIGGER MIDDLE FINGER OF LEFT HAND: Primary | ICD-10-CM

## 2021-02-10 PROCEDURE — 97140 MANUAL THERAPY 1/> REGIONS: CPT | Performed by: OCCUPATIONAL THERAPIST

## 2021-02-10 PROCEDURE — 97110 THERAPEUTIC EXERCISES: CPT | Performed by: OCCUPATIONAL THERAPIST

## 2021-02-10 NOTE — PROGRESS NOTES
Daily Note     Today's date: 2/10/2021  Patient name: Cammie Wrenr  : [id number]  MRN: [id number]  Referring provider: Tawana Wilks MD  Dx:   Encounter Diagnosis     ICD-10-CM    1  Trigger middle finger of left hand  M65 332                   Subjective: "It feels OK"      Objective: See treatment diary below      Assessment: See RE      Plan: Progress note during next visit        Precautions: Universal, Post-op trigger finger release  HEP: Blocking, tge, reverse blocking, finger pushups, intrinsic and extrinsic stretch      Manuals 1/15 1/19 1/21 1/25 1/29 2/5 2/8 2/10     Scar mobs 5 5' 5 5 5 5 5 5     Grade 2-3 joint mobs             PROM 10 10' 10 10 10 10 10 5                  Neuro Re-Ed             Translation Key pegs 1x Key pegs 1x Key pegs 1x Key pegs 1x Key pegs 1x Key pegs 1x Key pegs 1x      Pinch [place] RR TT 2x RG TT 2x RG TT 2x RG TT 2x GG TT x 2 GB TT x 2 GB TT 2x      Place and hold 10x  10x 10x 10x 10x 10x 10x     Pencil grasp 2x       4x     EDC   Off tabletoop x 15 Off tabletop x 15 Off tabletop x 15 Off tabletop x 15 Off tabletop x15                                Ther Ex             Isometric grasp             Gross grasp RPW pwr/cyl 30x RPW pwr/cyl 30x GPW 3x 10 GPW 3x 10 GPW 3x 10 GPW 3x 10 GPW 3x10      digigrip Red isol 30x/green comp 30x Red isol/green comp 30x green isol/green comp 30x green isol/green comp 30x green isol/green comp 30x Blue iso 2x 8 Blue isol & comp 2x8      Pencil grasp    4x 4x 4x 4x                                                          Ther Activity                                                                              Modalities              5' 5' 5 5 5  5' 5

## 2021-02-10 NOTE — PROGRESS NOTES
OT Re-Evaluation     Today's date: 2/10/2021  Patient name: Thea Pal  :   MRN: [id number]  Referring provider: Lonny Zee MD  Dx:   Encounter Diagnosis     ICD-10-CM    1  Trigger middle finger of left hand  M65 332                   Assessment  Assessment details:  Eduardo Donovan shows improved AROM and strength - she has persisting PIP and DIP stiffness but reports and demonstrates quick improvement with mild effort PROM/mobilization and 10-20 degrees improvement  Goals  STG) Motion increased by 25% in 4-6 weeks PARTIALLY MET  STG) Strength/Motor skills improved by 25% in 4-6 weeks NOT MET  STG) SwellingEdema improved by 25% in 4-6 weeks MET    STG) AROM increased 25% in 4 weeks PARTIALLY MET  STG) Grasp improved 25% in 4 weeks MET    STG) LF PIP/DIP AROM improved 15 degrees each in 2 weeks  LTG) ADL and IADL skills improved   LTG) Leisure skills improved    Patient Goals: To use my hand better    Goals, plan of care and treatment condition discussed with patient  Patient expresses their understanding and questions regarding these issues were addressed  Plan  Planned modality interventions: TENS, thermotherapy: hydrocollator packs and ultrasound  Planned therapy interventions: joint mobilization, manual therapy, Rodriguez taping, motor coordination training, orthotic fitting/training, therapeutic activities, therapeutic exercise, fine motor coordination training and functional ROM exercises  Frequency: 2x week  Duration in visits: 10        Subjective Evaluation    History of Present Illness  Mechanism of injury: Mechanism of injury: Eduardo Donovan has a several month history of  LF B trigger finger; she has recently  underwent a L LF trigger finger release    Pain  Current pain ratin  At worst pain ratin    Hand dominance: right          Objective     Active Range of Motion     Left Wrist   Wrist flexion: 60 degrees   Wrist extension: 60 degrees     Left Digits    Flexion Middle     MCP: 72    PIP: 70    DIP: 46  Extension   Middle     MCP: 10    PIP: 6    DIP: 0    Strength/Myotome Testing     Left Wrist/Hand      (2nd hand position)     Trial 1: 31 3    Right Wrist/Hand      (2nd hand position)     Trial 1: 32 6    Tests     Left Wrist/Hand   Positive Bunnel-Littler, extrinsic flexor tightness and intrinsic muscle tightness       Swelling     Left Wrist/Hand   Middle     Proximal: 6 6 cm    Right Wrist/Hand   Middle     Proximal: 6 5 cm               Precautions: Universal, Post-op trigger finger release  HEP: Blocking, tge, reverse blocking, finger pushups, intrinsic and extrinsic stretch      Manuals             Scar mobs             Grade 2-3 joint mobs                                       Neuro Re-Ed             Translation             Pinch [place]                                                                              Ther Ex             Isometric grasp             Gross grarsp                                                                                           Ther Activity                                                                              Modalities

## 2021-02-13 DIAGNOSIS — Z23 ENCOUNTER FOR IMMUNIZATION: ICD-10-CM

## 2021-02-15 ENCOUNTER — OFFICE VISIT (OUTPATIENT)
Dept: OCCUPATIONAL THERAPY | Facility: CLINIC | Age: 76
End: 2021-02-15
Payer: COMMERCIAL

## 2021-02-15 ENCOUNTER — IMMUNIZATIONS (OUTPATIENT)
Dept: FAMILY MEDICINE CLINIC | Facility: HOSPITAL | Age: 76
End: 2021-02-15

## 2021-02-15 DIAGNOSIS — Z23 ENCOUNTER FOR IMMUNIZATION: Primary | ICD-10-CM

## 2021-02-15 DIAGNOSIS — M65.332 TRIGGER MIDDLE FINGER OF LEFT HAND: Primary | ICD-10-CM

## 2021-02-15 PROCEDURE — 0001A SARS-COV-2 / COVID-19 MRNA VACCINE (PFIZER-BIONTECH) 30 MCG: CPT | Performed by: PHYSICIAN ASSISTANT

## 2021-02-15 PROCEDURE — 91300 SARS-COV-2 / COVID-19 MRNA VACCINE (PFIZER-BIONTECH) 30 MCG: CPT | Performed by: PHYSICIAN ASSISTANT

## 2021-02-15 PROCEDURE — 97110 THERAPEUTIC EXERCISES: CPT

## 2021-02-15 NOTE — PROGRESS NOTES
Daily Note     Today's date: 2/15/2021  Patient name: Glynn Christine  :   MRN: [id number]  Referring provider: Dick Rievr MD  Dx:   Encounter Diagnosis     ICD-10-CM    1  Trigger middle finger of left hand  M65 332                   Subjective: "It doesn't want to get straight this morning "      Objective: See treatment diary below      Assessment:  Mild PIP ext lag; improved after heat and stretch  Tolerated session well  Functional use is improving  Plan: Continue per plan of care        Precautions: Universal, Post-op trigger finger release  HEP: Blocking, tge, reverse blocking, finger pushups, intrinsic and extrinsic stretch      Manuals 1/15 1/19 1/21 1/25 1/29 2/5 2/8 2/10 2/15    Scar mobs 5 5' 5 5 5 5 5 5 5    Grade 2-3 joint mobs             PROM 10 10' 10 10 10 10 10 5 5                 Neuro Re-Ed             Translation Key pegs 1x Key pegs 1x Key pegs 1x Key pegs 1x Key pegs 1x Key pegs 1x Key pegs 1x  Key pegs 1x    Pinch [place] RR TT 2x RG TT 2x RG TT 2x RG TT 2x GG TT x 2 GB TT x 2 GB TT 2x  GB TT 2x    Place and hold 10x  10x 10x 10x 10x 10x 10x 10    Pencil grasp 2x       4x     EDC   Off tabletoop x 15 Off tabletop x 15 Off tabletop x 15 Off tabletop x 15 Off tabletop x15                                Ther Ex             Isometric grasp             Gross grasp RPW pwr/cyl 30x RPW pwr/cyl 30x GPW 3x 10 GPW 3x 10 GPW 3x 10 GPW 3x 10 GPW 3x10  GPW 3x10    digigrip Red isol 30x/green comp 30x Red isol/green comp 30x green isol/green comp 30x green isol/green comp 30x green isol/green comp 30x Blue iso 2x 8 Blue isol & comp 2x8  Blue isol & comp 2x8    Pencil grasp    4x 4x 4x 4x  4x                                                        Ther Activity                                                                              Modalities             MH 5' 5' 5 5 5  5' 5 5'

## 2021-02-17 ENCOUNTER — OFFICE VISIT (OUTPATIENT)
Dept: OCCUPATIONAL THERAPY | Facility: CLINIC | Age: 76
End: 2021-02-17
Payer: COMMERCIAL

## 2021-02-17 DIAGNOSIS — M65.332 TRIGGER MIDDLE FINGER OF LEFT HAND: Primary | ICD-10-CM

## 2021-02-17 PROCEDURE — 97140 MANUAL THERAPY 1/> REGIONS: CPT | Performed by: OCCUPATIONAL THERAPIST

## 2021-02-17 PROCEDURE — 97110 THERAPEUTIC EXERCISES: CPT | Performed by: OCCUPATIONAL THERAPIST

## 2021-02-17 PROCEDURE — 97112 NEUROMUSCULAR REEDUCATION: CPT | Performed by: OCCUPATIONAL THERAPIST

## 2021-02-17 NOTE — PROGRESS NOTES
Daily Note     Today's date: 2021  Patient name: Az Best  : [id number]  MRN: [id number]  Referring provider: Tenzin Nguyen MD  Dx:   Encounter Diagnosis     ICD-10-CM    1  Trigger middle finger of left hand  M65 332                   Subjective: "It clicked and locked some last night"      Objective: See treatment diary below      Assessment: Reports 1x episode of triggering with place and hold exercises  Otherwise stiffness persists but AROM is improving  Plan: Continue per plan of care        Precautions: Universal, Post-op trigger finger release  HEP: Blocking, tge, reverse blocking, finger pushups, intrinsic and extrinsic stretch      Manuals 1/15 1/19 1/21 1/25 1/29 2/5 2/8 2/10 2/15 2/17   Scar mobs 5 5' 5 5 5 5 5 5 5 5   Grade 2-3 joint mobs             PROM 10 10' 10 10 10 10 10 5 5 5                Neuro Re-Ed             Translation Key pegs 1x Key pegs 1x Key pegs 1x Key pegs 1x Key pegs 1x Key pegs 1x Key pegs 1x  Key pegs 1x keypegs x 1   Pinch [place] RR TT 2x RG TT 2x RG TT 2x RG TT 2x GG TT x 2 GB TT x 2 GB TT 2x  GB TT 2x GB TT 2x   Place and hold 10x  10x 10x 10x 10x 10x 10x 10 10   Pencil grasp 2x       4x     EDC   Off tabletoop x 15 Off tabletop x 15 Off tabletop x 15 Off tabletop x 15 Off tabletop x15                                Ther Ex             Isometric grasp             Gross grasp RPW pwr/cyl 30x RPW pwr/cyl 30x GPW 3x 10 GPW 3x 10 GPW 3x 10 GPW 3x 10 GPW 3x10  GPW 3x10 GPW 3x10     digigrip Red isol 30x/green comp 30x Red isol/green comp 30x green isol/green comp 30x green isol/green comp 30x green isol/green comp 30x Blue iso 2x 8 Blue isol & comp 2x8  Blue isol & comp 2x8 Blue isol & comp 2x8   Pencil grasp    4x 4x 4x 4x  4x 4x                                                       Ther Activity                                                                              Modalities             MH 5' 5' 5 5 5  5' 5 5' 5

## 2021-02-22 ENCOUNTER — OFFICE VISIT (OUTPATIENT)
Dept: OCCUPATIONAL THERAPY | Facility: CLINIC | Age: 76
End: 2021-02-22
Payer: COMMERCIAL

## 2021-02-22 ENCOUNTER — OFFICE VISIT (OUTPATIENT)
Dept: OBGYN CLINIC | Facility: CLINIC | Age: 76
End: 2021-02-22

## 2021-02-22 VITALS
SYSTOLIC BLOOD PRESSURE: 159 MMHG | HEART RATE: 63 BPM | BODY MASS INDEX: 31.02 KG/M2 | HEIGHT: 60 IN | WEIGHT: 158 LBS | DIASTOLIC BLOOD PRESSURE: 74 MMHG

## 2021-02-22 DIAGNOSIS — M65.341 TRIGGER RING FINGER OF RIGHT HAND: ICD-10-CM

## 2021-02-22 DIAGNOSIS — M65.332 TRIGGER MIDDLE FINGER OF LEFT HAND: Primary | ICD-10-CM

## 2021-02-22 DIAGNOSIS — Z98.890 S/P TRIGGER FINGER RELEASE: Primary | ICD-10-CM

## 2021-02-22 PROCEDURE — 97110 THERAPEUTIC EXERCISES: CPT

## 2021-02-22 PROCEDURE — 97140 MANUAL THERAPY 1/> REGIONS: CPT

## 2021-02-22 PROCEDURE — 99024 POSTOP FOLLOW-UP VISIT: CPT | Performed by: SURGERY

## 2021-02-22 NOTE — PROGRESS NOTES
Assessment/Plan:  Patient ID: Roger Bowers 70 y o  female   Surgery: Long Trigger Finger Release - Left and Ring Trigger Finger Injection - Right  Date of Surgery: 11/30/2020    Paris Desir continues to improve with therapy but is still lacking in flexion  Advised that she continue with therapy and home exercises  She may begin transitioning to once weekly and eventually HEP at her and her therapists' digression  She notes one episode of clicking in the operative finger since surgery, but states this has not happened before or since  Right ring finger continues to click and lock after injection but she would like to hold off on surgery for now   Follow up in 6 weeks for repeat evaluation     Follow Up:  6 weeks    To Do Next Visit:   ROM check      CHIEF COMPLAINT:  Chief Complaint   Patient presents with    Left Hand - Follow-up         SUBJECTIVE:  Roger Bowers is a 70y o  year old female who presents for follow up after Long Trigger Finger Release - Left and Ring Trigger Finger Injection - Right  Today patient notes improvement in her operative finger in regards to motion but still has stiffness particularly in the morning  She is working with therapy 2 times weekly  Right ring finger still clicking and locking but not to the point that she has to unlock it with the other hand  PHYSICAL EXAMINATION:  General: well developed and well nourished, alert, oriented times 3 and appears comfortable  Psychiatric: Normal    MUSCULOSKELETAL EXAMINATION:  Incision: Clean, dry, intact and healed  Surgery Site: normal, no evidence of infection   Range of Motion: left long finger ROM still slighly limited by stiffness, lacking the last 10 degrees or so at each joint     Neurovascular status: Neuro intact, good cap refill  Activity Restrictions: No restrictions         STUDIES REVIEWED:  No Studies to review      PROCEDURES PERFORMED:  Procedures  No Procedures performed today      Delmar Daniels PA-C

## 2021-02-22 NOTE — PROGRESS NOTES
Daily Note     Today's date: 2021  Patient name: Ning Garcia  :   MRN: [id number]  Referring provider: Jessenia Bush MD  Dx:   Encounter Diagnosis     ICD-10-CM    1  Trigger middle finger of left hand  M65 332                   Subjective: It feels good this morning  Objective: See treatment diary below      Assessment: Most stiffness of the RF is in the morning; it improves as the day goes on  She has not experienced any triggering since last week when she was in therapy  Plan: Cont 1x/week for 2 weeks, then reassess for D/C to HEP       Precautions: Universal, Post-op trigger finger release  HEP: Blocking, tge, reverse blocking, finger pushups, intrinsic and extrinsic stretch      Manuals 1/15 1/19 1/21 1/25 1/29 2/5 2/8 2/10 2/15 2/17 2/22   Scar mobs 5 5' 5 5 5 5 5 5 5 5 5'   Grade 2-3 joint mobs              PROM 10 10' 10 10 10 10 10 5 5 5 5'                 Neuro Re-Ed              Translation Key pegs 1x Key pegs 1x Key pegs 1x Key pegs 1x Key pegs 1x Key pegs 1x Key pegs 1x  Key pegs 1x keypegs x 1 Key pegs 1x   Pinch [place] RR TT 2x RG TT 2x RG TT 2x RG TT 2x GG TT x 2 GB TT x 2 GB TT 2x  GB TT 2x GB TT 2x GB TT 2x   Place and hold 10x  10x 10x 10x 10x 10x 10x 10 10 10x   Pencil grasp 2x       4x      EDC   Off tabletoop x 15 Off tabletop x 15 Off tabletop x 15 Off tabletop x 15 Off tabletop x15                                   Ther Ex              Isometric grasp              Gross grasp RPW pwr/cyl 30x RPW pwr/cyl 30x GPW 3x 10 GPW 3x 10 GPW 3x 10 GPW 3x 10 GPW 3x10  GPW 3x10 GPW 3x10   GPW 3x10   digigrip Red isol 30x/green comp 30x Red isol/green comp 30x green isol/green comp 30x green isol/green comp 30x green isol/green comp 30x Blue iso 2x 8 Blue isol & comp 2x8  Blue isol & comp 2x8 Blue isol & comp 2x8 Blue isol & comp 2x8   Pencil grasp    4x 4x 4x 4x  4x 4x 4x                                                           Ther Activity Modalities               5' 5' 5 5 5  5' 5 5' 5 5'

## 2021-02-26 ENCOUNTER — APPOINTMENT (OUTPATIENT)
Dept: OCCUPATIONAL THERAPY | Facility: CLINIC | Age: 76
End: 2021-02-26
Payer: COMMERCIAL

## 2021-03-03 ENCOUNTER — OFFICE VISIT (OUTPATIENT)
Dept: OCCUPATIONAL THERAPY | Facility: CLINIC | Age: 76
End: 2021-03-03
Payer: COMMERCIAL

## 2021-03-03 DIAGNOSIS — M65.332 TRIGGER MIDDLE FINGER OF LEFT HAND: Primary | ICD-10-CM

## 2021-03-03 PROCEDURE — 97140 MANUAL THERAPY 1/> REGIONS: CPT

## 2021-03-03 PROCEDURE — 97110 THERAPEUTIC EXERCISES: CPT

## 2021-03-03 NOTE — PROGRESS NOTES
Daily Note     Today's date: 3/3/2021  Patient name: Katiuska Singh  : [birth year]  MRN: [id number]  Referring provider: Niurka Keyes MD  Dx:   Encounter Diagnosis     ICD-10-CM    1  Trigger middle finger of left hand  M65 332                   Subjective: It's def more loose after the heat  Objective: See treatment diary below      Assessment:  Pt denies triggering in the finger  Pt doing well with functional hand use  Plan: Reassess next session for D/C to HEP       Precautions: Universal, Post-op trigger finger release  HEP: Blocking, tge, reverse blocking, finger pushups, intrinsic and extrinsic stretch      Manuals 3/3 1/19 1/21 1/25 1/29 2/5 2/8 2/10 2/15 2/17 2/22   Scar mobs 5 5' 5 5 5 5 5 5 5 5 5'   Grade 2-3 joint mobs              PROM 5' 10' 10 10 10 10 10 5 5 5 5'                 Neuro Re-Ed              Translation Key pegs 1x Key pegs 1x Key pegs 1x Key pegs 1x Key pegs 1x Key pegs 1x Key pegs 1x  Key pegs 1x keypegs x 1 Key pegs 1x   Pinch [place] GB TT 2x RG TT 2x RG TT 2x RG TT 2x GG TT x 2 GB TT x 2 GB TT 2x  GB TT 2x GB TT 2x GB TT 2x   Place and hold 10x  10x 10x 10x 10x 10x 10x 10 10 10x   EDC   Off tabletoop x 15 Off tabletop x 15 Off tabletop x 15 Off tabletop x 15 Off tabletop x15                                   Ther Ex              Isometric grasp              Gross grasp GPW pwr/cyl 30x RPW pwr/cyl 30x GPW 3x 10 GPW 3x 10 GPW 3x 10 GPW 3x 10 GPW 3x10  GPW 3x10 GPW 3x10   GPW 3x10   digigrip blue isol 30x  Red isol/green comp 30x green isol/green comp 30x green isol/green comp 30x green isol/green comp 30x Blue iso 2x 8 Blue isol & comp 2x8  Blue isol & comp 2x8 Blue isol & comp 2x8 Blue isol & comp 2x8   Pencil grasp 4x   4x 4x 4x 4x  4x 4x 4x                                                           Ther Activity                                                                                    Modalities              MH 5' 5' 5 5 5  5' 5 5' 5 5'

## 2021-03-07 ENCOUNTER — IMMUNIZATIONS (OUTPATIENT)
Dept: FAMILY MEDICINE CLINIC | Facility: HOSPITAL | Age: 76
End: 2021-03-07

## 2021-03-07 DIAGNOSIS — Z23 ENCOUNTER FOR IMMUNIZATION: Primary | ICD-10-CM

## 2021-03-07 PROCEDURE — 0002A SARS-COV-2 / COVID-19 MRNA VACCINE (PFIZER-BIONTECH) 30 MCG: CPT

## 2021-03-07 PROCEDURE — 91300 SARS-COV-2 / COVID-19 MRNA VACCINE (PFIZER-BIONTECH) 30 MCG: CPT

## 2021-03-10 ENCOUNTER — EVALUATION (OUTPATIENT)
Dept: OCCUPATIONAL THERAPY | Facility: CLINIC | Age: 76
End: 2021-03-10
Payer: COMMERCIAL

## 2021-03-10 DIAGNOSIS — M65.332 TRIGGER MIDDLE FINGER OF LEFT HAND: Primary | ICD-10-CM

## 2021-03-10 PROCEDURE — 97110 THERAPEUTIC EXERCISES: CPT | Performed by: OCCUPATIONAL THERAPIST

## 2021-03-10 PROCEDURE — 97140 MANUAL THERAPY 1/> REGIONS: CPT | Performed by: OCCUPATIONAL THERAPIST

## 2021-03-10 NOTE — PROGRESS NOTES
OT Re-Evaluation     Today's date: 3/10/2021  Patient name: Aviva Bolanos  :   MRN: [id number]  Referring provider: Beto See MD  Dx:   Encounter Diagnosis     ICD-10-CM    1  Trigger middle finger of left hand  M65 332                   Assessment  Assessment details:  Mehnaz Fields has continued AROM and strength gains - she reports that she always has AM stiffness but that it resolves by mid day  At this point she wishes to transition to HEP but retain the option to return should she worsen  Goals  STG) Motion increased by 25% in 4-6 weeks PARTIALLY MET  STG) Strength/Motor skills improved by 25% in 4-6 weeks NOT MET  STG) SwellingEdema improved by 25% in 4-6 weeks MET    STG) AROM increased 25% in 4 weeks PARTIALLY MET  STG) Grasp improved 25% in 4 weeks MET    STG) LF PIP/DIP AROM improved 15 degrees each in 2 weeks  PARTIALLY MET    LTG) ADL and IADL skills improved   LTG) Leisure skills improved    Patient Goals: To use my hand better    Goals, plan of care and treatment condition discussed with patient  Patient expresses their understanding and questions regarding these issues were addressed  Plan  Planned modality interventions: TENS, thermotherapy: hydrocollator packs and ultrasound  Planned therapy interventions: joint mobilization, manual therapy, Rodriguez taping, motor coordination training, orthotic fitting/training, therapeutic activities, therapeutic exercise, fine motor coordination training and functional ROM exercises  Frequency: 2x week  Duration in visits: 10        Subjective Evaluation    History of Present Illness  Mechanism of injury: Mechanism of injury: Mehnaz Fields has a several month history of  LF B trigger finger; she has recently  underwent a L LF trigger finger release    Pain  Current pain ratin  At worst pain ratin    Hand dominance: right          Objective     Active Range of Motion     Left Wrist   Wrist flexion: 60 degrees   Wrist extension: 60 degrees     Left Digits    Flexion   Middle     MCP: 78    PIP: 78    DIP: 52  Extension   Middle     MCP: 10    PIP: 0    DIP: 0    Strength/Myotome Testing     Left Wrist/Hand      (2nd hand position)     Trial 1: 34 4    Right Wrist/Hand      (2nd hand position)     Trial 1: 31 2    Tests     Left Wrist/Hand   Positive extrinsic flexor tightness and intrinsic muscle tightness  Negative Zeb-Littlezita                  Precautions: Universal, Post-op trigger finger release  HEP: Blocking, tge, reverse blocking, finger pushups, intrinsic and extrinsic stretch      Manuals             Scar mobs             Grade 2-3 joint mobs                                       Neuro Re-Ed             Translation             Pinch [place]                                                                              Ther Ex             Isometric grasp             Gross grarsp                                                                                           Ther Activity                                                                              Modalities

## 2021-04-08 ENCOUNTER — OFFICE VISIT (OUTPATIENT)
Dept: OBGYN CLINIC | Facility: CLINIC | Age: 76
End: 2021-04-08
Payer: COMMERCIAL

## 2021-04-08 VITALS
BODY MASS INDEX: 31.02 KG/M2 | SYSTOLIC BLOOD PRESSURE: 149 MMHG | DIASTOLIC BLOOD PRESSURE: 70 MMHG | WEIGHT: 158 LBS | HEART RATE: 66 BPM | HEIGHT: 60 IN

## 2021-04-08 DIAGNOSIS — M65.341 TRIGGER RING FINGER OF RIGHT HAND: ICD-10-CM

## 2021-04-08 DIAGNOSIS — Z98.890 S/P TRIGGER FINGER RELEASE: Primary | ICD-10-CM

## 2021-04-08 PROCEDURE — 99213 OFFICE O/P EST LOW 20 MIN: CPT | Performed by: SURGERY

## 2021-04-08 PROCEDURE — 1160F RVW MEDS BY RX/DR IN RCRD: CPT | Performed by: SURGERY

## 2021-04-08 PROCEDURE — 1036F TOBACCO NON-USER: CPT | Performed by: SURGERY

## 2021-04-08 PROCEDURE — 3008F BODY MASS INDEX DOCD: CPT | Performed by: SURGERY

## 2021-04-08 NOTE — PROGRESS NOTES
ASSESSMENT/PLAN:      70 y o  female aprox  4 months s/p left long finger trigger finger release and right ring finger trigger finger CSI, DOS 11/30/2020  Alma Wynne is doing well  Activities to tolerance, no restrictions  Continue heat and stretching exercises  If the right ring finger begins to lock again, a trigger finger release may be discussed  Follow up in the office as needed if symptoms worsen or fail to improve  The patient verbalized understanding of exam findings and treatment plan  We engaged in the shared decision-making process and treatment options were discussed at length with the patient  Surgical and conservative management discussed today along with risks and benefits  Diagnoses and all orders for this visit:    S/P trigger finger release    Trigger ring finger of right hand      Follow Up:  Return if symptoms worsen or fail to improve  To Do Next Visit:  Re-evaluation of current issue    ____________________________________________________________________________________________________________________________________________      CHIEF COMPLAINT:  Chief Complaint   Patient presents with    Follow-up       SUBJECTIVE:  Gavino Olea is a 70y o  year old  female who presents to the office today aprox  4 months s/p left long finger trigger finger release and right ring finger trigger finger CSI, DOS 11/30/2020  Overall Alma Wynne is doing well  She states that her left long finger ha not clicked or locked since the one instance she had post op  She also states that her right ring finger locked once 4 weeks ago, but has not locked since  Alma Wynne will experience stiffness in the Am's, which improved with warm water soaks  She continue to use the right ring finger trigger finger brace when paining  Alma Wynne does have difficulty gripping the curling iron in the mornings as well as difficulty when using a knife to cut food, but overall symptoms have improved            I have personally reviewed all the relevant PMH, PSH, SH, FH, Medications and allergies  PAST MEDICAL HISTORY:  Past Medical History:   Diagnosis Date    Diverticulosis     Hyperlipidemia     Hypertension     Osteoarthritis     Osteopenia        PAST SURGICAL HISTORY:  Past Surgical History:   Procedure Laterality Date    INDUCED       x2    KNEE ARTHROSCOPY      therapeutic     MASS EXCISION Right 2020    Procedure: RING TRIGGER FINGER INJECTION;  Surgeon: Capo Johnson MD;  [facility];  Service: Orthopedics    NEUROPLASTY / TRANSPOSITION MEDIAN NERVE AT CARPAL TUNNEL      IL INCISE FINGER TENDON SHEATH Left 2020    Procedure: LONG TRIGGER FINGER RELEASE;  Surgeon: Capo Johnson MD;  [facility];  Service: Orthopedics       FAMILY HISTORY:  Family History   Problem Relation Age of Onset    Stroke Mother         syndrome    Hypertension Mother     Hypothyroidism Mother     Osteoarthritis Mother     Stroke Father         syndrome     Diabetes Father     Hypertension Father     Osteoarthritis Father     Diabetes Brother     Melanoma Sister     Squamous cell carcinoma Sister     Hypothyroidism Sister     No Known Problems Maternal Grandmother     No Known Problems Maternal Grandfather     No Known Problems Paternal Grandmother     No Known Problems Paternal Grandfather     No Known Problems Sister     No Known Problems Sister     No Known Problems Sister     Lung cancer Maternal Aunt 48    No Known Problems Maternal Aunt     No Known Problems Paternal Aunt     No Known Problems Paternal Aunt     No Known Problems Paternal Aunt        SOCIAL HISTORY:  Social History     Tobacco Use    Smoking status: Former Smoker    Smokeless tobacco: Never Used    Tobacco comment: 50 years ago   Substance Use Topics    Alcohol use:  Yes     Alcohol/week: 2 0 standard drinks     Types: 2 Glasses of wine per week     Binge frequency: Daily or almost daily     Comment: social    Drug use: No       MEDICATIONS:    Current Outpatient Medications:     alendronate (FOSAMAX) 70 mg tablet, TAKE 1 TABLET BY MOUTH  EVERY 7 DAYS, Disp: 12 tablet, Rfl: 3    ascorbic acid (VITAMIN C) 250 mg tablet, Take 250 mg by mouth daily  , Disp: , Rfl:     aspirin 81 MG tablet, Take 81 mg by mouth daily  , Disp: , Rfl:     Biotin 10 MG CAPS, Take 1 capsule by mouth daily  , Disp: , Rfl:     Calcium Carbonate-Vitamin D (CALCIUM 600+D HIGH POTENCY) 600-400 MG-UNIT per tablet, Take 1 tablet by mouth daily  , Disp: , Rfl:     cholecalciferol (VITAMIN D3) 1,000 units tablet, Take 1,000 Units by mouth daily, Disp: , Rfl:     coenzyme Q-10 100 MG capsule, Take 100 mg by mouth daily  , Disp: , Rfl:     lisinopril (ZESTRIL) 10 mg tablet, TAKE 1 TABLET BY MOUTH  DAILY, Disp: 90 tablet, Rfl: 3    Magnesium Citrate 100 MG TABS, Take 1 tablet by mouth daily  , Disp: , Rfl:     Omega-3 Fatty Acids ([employer] FISH OIL) 1000 MG CAPS, Take by mouth 2 (two) times a day  , Disp: , Rfl:     rosuvastatin (CRESTOR) 20 MG tablet, Take 1 tablet (20 mg total) by mouth daily, Disp: 90 tablet, Rfl: 3    ALLERGIES:  Allergies   Allergen Reactions    Penicillins Rash    Sulfamethoxazole-Trimethoprim Rash     Reaction Date: [id number];        REVIEW OF SYSTEMS:  Review of Systems   Constitutional: Negative for chills, fever and unexpected weight change  HENT: Negative for hearing loss, nosebleeds and sore throat  Eyes: Negative for pain, redness and visual disturbance  Respiratory: Negative for cough, shortness of breath and wheezing  Cardiovascular: Negative for chest pain, palpitations and leg swelling  Gastrointestinal: Negative for abdominal pain, nausea and vomiting  Endocrine: Negative for polydipsia and polyuria  Genitourinary: Negative for difficulty urinating and hematuria  Musculoskeletal: Negative for arthralgias, joint swelling and myalgias  Skin: Negative for rash and wound     Neurological: Negative for dizziness, numbness and headaches  Psychiatric/Behavioral: Negative for decreased concentration, dysphoric mood and suicidal ideas  The patient is not nervous/anxious  VITALS:  Vitals:    04/08/21 0848   BP: 149/70   Pulse: 66       LABS:  HgA1c: No results found for: HGBA1C  BMP:   Lab Results   Component Value Date    GLUCOSE 67 11/12/2015    CALCIUM 8 6 11/18/2020     11/12/2015    K 4 3 11/18/2020    CO2 28 11/18/2020     11/18/2020    BUN 11 11/18/2020    CREATININE 0 89 11/18/2020       _____________________________________________________  PHYSICAL EXAMINATION:  General: well developed and well nourished, alert, oriented times 3 and appears comfortable  Psychiatric: Normal  HEENT: Normocephalic, Atraumatic Trachea Midline, No torticollis  Pulmonary: No audible wheezing or respiratory distress   Cardiovascular: No pitting edema, 2+ radial pulse   Skin: No masses, erythema, lacerations, fluctation, ulcerations  Neurovascular: Sensation Intact to the Median, Ulnar, Radial Nerve, Motor Intact to the Median, Ulnar, Radial Nerve and Pulses Intact  Musculoskeletal: Normal, except as noted in detailed exam and in HPI  MUSCULOSKELETAL EXAMINATION:    left long finger:  Negative palpable nodule over the A1 pulley  Negative tenderness to palpation over A1 pulley  Negative catching  Negative clicking  Well healed surgical incision  right ring finger:  Negative palpable nodule over the A1 pulley  Negative tenderness to palpation over A1 pulley  Negative catching  Negative clicking  + crepitus        ___________________________________________________  STUDIES REVIEWED:  No new imaging to review            PROCEDURES PERFORMED:  Procedures  No Procedures performed today    _____________________________________________________      Harmeet Bryant    I,:  Josepha Nageotte Ditmars am acting as a scribe while in the presence of the attending physician :       I,:  Fanny Crocker MD personally performed the services described in this documentation    as scribed in my presence :

## 2021-05-14 ENCOUNTER — APPOINTMENT (OUTPATIENT)
Dept: LAB | Facility: CLINIC | Age: 76
End: 2021-05-14
Payer: COMMERCIAL

## 2021-05-14 ENCOUNTER — OFFICE VISIT (OUTPATIENT)
Dept: FAMILY MEDICINE CLINIC | Facility: CLINIC | Age: 76
End: 2021-05-14
Payer: COMMERCIAL

## 2021-05-14 VITALS
DIASTOLIC BLOOD PRESSURE: 70 MMHG | RESPIRATION RATE: 16 BRPM | HEART RATE: 62 BPM | HEIGHT: 60 IN | BODY MASS INDEX: 31.41 KG/M2 | WEIGHT: 160 LBS | SYSTOLIC BLOOD PRESSURE: 134 MMHG | TEMPERATURE: 97.3 F

## 2021-05-14 DIAGNOSIS — D03.72 MELANOMA IN SITU OF LEFT LOWER EXTREMITY (HCC): ICD-10-CM

## 2021-05-14 DIAGNOSIS — M25.511 ACUTE PAIN OF RIGHT SHOULDER: ICD-10-CM

## 2021-05-14 DIAGNOSIS — I10 ESSENTIAL HYPERTENSION: ICD-10-CM

## 2021-05-14 DIAGNOSIS — E66.09 CLASS 1 OBESITY DUE TO EXCESS CALORIES WITH SERIOUS COMORBIDITY AND BODY MASS INDEX (BMI) OF 30.0 TO 30.9 IN ADULT: ICD-10-CM

## 2021-05-14 DIAGNOSIS — E03.8 SUBCLINICAL HYPOTHYROIDISM: Primary | ICD-10-CM

## 2021-05-14 DIAGNOSIS — E03.8 SUBCLINICAL HYPOTHYROIDISM: ICD-10-CM

## 2021-05-14 DIAGNOSIS — E78.2 MIXED HYPERLIPIDEMIA: ICD-10-CM

## 2021-05-14 DIAGNOSIS — D50.8 IRON DEFICIENCY ANEMIA SECONDARY TO INADEQUATE DIETARY IRON INTAKE: ICD-10-CM

## 2021-05-14 LAB
ALBUMIN SERPL BCP-MCNC: 3.6 G/DL (ref 3.5–5)
ALP SERPL-CCNC: 83 U/L (ref 46–116)
ALT SERPL W P-5'-P-CCNC: 43 U/L (ref 12–78)
ANION GAP SERPL CALCULATED.3IONS-SCNC: 6 MMOL/L (ref 4–13)
AST SERPL W P-5'-P-CCNC: 24 U/L (ref 5–45)
BASOPHILS # BLD AUTO: 0.09 THOUSANDS/ΜL (ref 0–0.1)
BASOPHILS NFR BLD AUTO: 1 % (ref 0–1)
BILIRUB SERPL-MCNC: 0.46 MG/DL (ref 0.2–1)
BUN SERPL-MCNC: 17 MG/DL (ref 5–25)
CALCIUM SERPL-MCNC: 8.8 MG/DL (ref 8.3–10.1)
CHLORIDE SERPL-SCNC: 110 MMOL/L (ref 100–108)
CO2 SERPL-SCNC: 27 MMOL/L (ref 21–32)
CREAT SERPL-MCNC: 0.85 MG/DL (ref 0.6–1.3)
EOSINOPHIL # BLD AUTO: 0.2 THOUSAND/ΜL (ref 0–0.61)
EOSINOPHIL NFR BLD AUTO: 3 % (ref 0–6)
ERYTHROCYTE [DISTWIDTH] IN BLOOD BY AUTOMATED COUNT: 12.9 % (ref 11.6–15.1)
GFR SERPL CREATININE-BSD FRML MDRD: 68 ML/MIN/1.73SQ M
GLUCOSE P FAST SERPL-MCNC: 87 MG/DL (ref 65–99)
HCT VFR BLD AUTO: 45.1 % (ref 34.8–46.1)
HGB BLD-MCNC: 14.8 G/DL (ref 11.5–15.4)
IMM GRANULOCYTES # BLD AUTO: 0.01 THOUSAND/UL (ref 0–0.2)
IMM GRANULOCYTES NFR BLD AUTO: 0 % (ref 0–2)
LYMPHOCYTES # BLD AUTO: 2.51 THOUSANDS/ΜL (ref 0.6–4.47)
LYMPHOCYTES NFR BLD AUTO: 38 % (ref 14–44)
MCH RBC QN AUTO: 30.5 PG (ref 26.8–34.3)
MCHC RBC AUTO-ENTMCNC: 32.8 G/DL (ref 31.4–37.4)
MCV RBC AUTO: 93 FL (ref 82–98)
MONOCYTES # BLD AUTO: 0.54 THOUSAND/ΜL (ref 0.17–1.22)
MONOCYTES NFR BLD AUTO: 8 % (ref 4–12)
NEUTROPHILS # BLD AUTO: 3.32 THOUSANDS/ΜL (ref 1.85–7.62)
NEUTS SEG NFR BLD AUTO: 50 % (ref 43–75)
NRBC BLD AUTO-RTO: 0 /100 WBCS
PLATELET # BLD AUTO: 241 THOUSANDS/UL (ref 149–390)
PMV BLD AUTO: 9.9 FL (ref 8.9–12.7)
POTASSIUM SERPL-SCNC: 4.1 MMOL/L (ref 3.5–5.3)
PROT SERPL-MCNC: 7.4 G/DL (ref 6.4–8.2)
RBC # BLD AUTO: 4.86 MILLION/UL (ref 3.81–5.12)
SODIUM SERPL-SCNC: 143 MMOL/L (ref 136–145)
TSH SERPL DL<=0.05 MIU/L-ACNC: 2.76 UIU/ML (ref 0.36–3.74)
WBC # BLD AUTO: 6.67 THOUSAND/UL (ref 4.31–10.16)

## 2021-05-14 PROCEDURE — 3725F SCREEN DEPRESSION PERFORMED: CPT | Performed by: PHYSICIAN ASSISTANT

## 2021-05-14 PROCEDURE — 3008F BODY MASS INDEX DOCD: CPT | Performed by: PHYSICIAN ASSISTANT

## 2021-05-14 PROCEDURE — 36415 COLL VENOUS BLD VENIPUNCTURE: CPT

## 2021-05-14 PROCEDURE — 1170F FXNL STATUS ASSESSED: CPT | Performed by: PHYSICIAN ASSISTANT

## 2021-05-14 PROCEDURE — 84443 ASSAY THYROID STIM HORMONE: CPT

## 2021-05-14 PROCEDURE — 80053 COMPREHEN METABOLIC PANEL: CPT

## 2021-05-14 PROCEDURE — 1160F RVW MEDS BY RX/DR IN RCRD: CPT | Performed by: PHYSICIAN ASSISTANT

## 2021-05-14 PROCEDURE — 1125F AMNT PAIN NOTED PAIN PRSNT: CPT | Performed by: PHYSICIAN ASSISTANT

## 2021-05-14 PROCEDURE — 99214 OFFICE O/P EST MOD 30 MIN: CPT | Performed by: PHYSICIAN ASSISTANT

## 2021-05-14 PROCEDURE — 1101F PT FALLS ASSESS-DOCD LE1/YR: CPT | Performed by: PHYSICIAN ASSISTANT

## 2021-05-14 PROCEDURE — G0439 PPPS, SUBSEQ VISIT: HCPCS | Performed by: PHYSICIAN ASSISTANT

## 2021-05-14 PROCEDURE — 3288F FALL RISK ASSESSMENT DOCD: CPT | Performed by: PHYSICIAN ASSISTANT

## 2021-05-14 PROCEDURE — 85025 COMPLETE CBC W/AUTO DIFF WBC: CPT

## 2021-05-14 PROCEDURE — 1036F TOBACCO NON-USER: CPT | Performed by: PHYSICIAN ASSISTANT

## 2021-05-14 RX ORDER — ALPRAZOLAM 0.25 MG/1
0.25 TABLET ORAL AS NEEDED
COMMUNITY

## 2021-05-14 NOTE — PATIENT INSTRUCTIONS
Medicare Preventive Visit Patient Instructions  Thank you for completing your Welcome to Medicare Visit or Medicare Annual Wellness Visit today  Your next wellness visit will be due in one year (5/15/2022)  The screening/preventive services that you may require over the next 5-10 years are detailed below  Some tests may not apply to you based off risk factors and/or age  Screening tests ordered at today's visit but not completed yet may show as past due  Also, please note that scanned in results may not display below  Preventive Screenings:  Service Recommendations Previous Testing/Comments   Colorectal Cancer Screening  * Colonoscopy    * Fecal Occult Blood Test (FOBT)/Fecal Immunochemical Test (FIT)  * Fecal DNA/Cologuard Test  * Flexible Sigmoidoscopy Age: 54-65 years old   Colonoscopy: every 10 years (may be performed more frequently if at higher risk)  OR  FOBT/FIT: every 1 year  OR  Cologuard: every 3 years  OR  Sigmoidoscopy: every 5 years  Screening may be recommended earlier than age 48 if at higher risk for colorectal cancer  Also, an individualized decision between you and your healthcare provider will decide whether screening between the ages of 74-80 would be appropriate  Colonoscopy: 08/15/2019  FOBT/FIT: Not on file  Cologuard: Not on file  Sigmoidoscopy: Not on file    Screening Current     Breast Cancer Screening Age: 36 years old  Frequency: every 1-2 years  Not required if history of left and right mastectomy Mammogram: 11/16/2020    Screening Current   Cervical Cancer Screening Between the ages of 21-29, pap smear recommended once every 3 years  Between the ages of 33-67, can perform pap smear with HPV co-testing every 5 years     Recommendations may differ for women with a history of total hysterectomy, cervical cancer, or abnormal pap smears in past  Pap Smear: 02/19/2020    Screening Not Indicated   Hepatitis C Screening Once for adults born between 1945 and 1965  More frequently in patients at high risk for Hepatitis C Hep C Antibody: 01/24/2019    Screening Current   Diabetes Screening 1-2 times per year if you're at risk for diabetes or have pre-diabetes Fasting glucose: 97 mg/dL   A1C: No results in last 5 years    Screening Current   Cholesterol Screening Once every 5 years if you don't have a lipid disorder  May order more often based on risk factors  Lipid panel: 01/30/2020    Screening Not Indicated  History Lipid Disorder     Other Preventive Screenings Covered by Medicare:  1  Abdominal Aortic Aneurysm (AAA) Screening: covered once if your at risk  You're considered to be at risk if you have a family history of AAA  2  Lung Cancer Screening: covers low dose CT scan once per year if you meet all of the following conditions: (1) Age 50-69; (2) No signs or symptoms of lung cancer; (3) Current smoker or have quit smoking within the last 15 years; (4) You have a tobacco smoking history of at least 30 pack years (packs per day multiplied by number of years you smoked); (5) You get a written order from a healthcare provider  3  Glaucoma Screening: covered annually if you're considered high risk: (1) You have diabetes OR (2) Family history of glaucoma OR (3)  aged 48 and older OR (3)  American aged 72 and older  3  Osteoporosis Screening: covered every 2 years if you meet one of the following conditions: (1) You're estrogen deficient and at risk for osteoporosis based off medical history and other findings; (2) Have a vertebral abnormality; (3) On glucocorticoid therapy for more than 3 months; (4) Have primary hyperparathyroidism; (5) On osteoporosis medications and need to assess response to drug therapy  · Last bone density test (DXA Scan): 03/08/2019   5  HIV Screening: covered annually if you're between the age of 15-65  Also covered annually if you are younger than 13 and older than 72 with risk factors for HIV infection   For pregnant patients, it is covered up to 3 times per pregnancy  Immunizations:  Immunization Recommendations   Influenza Vaccine Annual influenza vaccination during flu season is recommended for all persons aged >= 6 months who do not have contraindications   Pneumococcal Vaccine (Prevnar and Pneumovax)  * Prevnar = PCV13  * Pneumovax = PPSV23   Adults 25-60 years old: 1-3 doses may be recommended based on certain risk factors  Adults 72 years old: Prevnar (PCV13) vaccine recommended followed by Pneumovax (PPSV23) vaccine  If already received PPSV23 since turning 65, then PCV13 recommended at least one year after PPSV23 dose  Hepatitis B Vaccine 3 dose series if at intermediate or high risk (ex: diabetes, end stage renal disease, liver disease)   Tetanus (Td) Vaccine - COST NOT COVERED BY MEDICARE PART B Following completion of primary series, a booster dose should be given every 10 years to maintain immunity against tetanus  Td may also be given as tetanus wound prophylaxis  Tdap Vaccine - COST NOT COVERED BY MEDICARE PART B Recommended at least once for all adults  For pregnant patients, recommended with each pregnancy  Shingles Vaccine (Shingrix) - COST NOT COVERED BY MEDICARE PART B  2 shot series recommended in those aged 48 and above     Health Maintenance Due:      Topic Date Due    MAMMOGRAM  11/16/2021    Colonoscopy Surveillance  08/15/2024    Colorectal Cancer Screening  08/15/2029    Hepatitis C Screening  Completed     Immunizations Due:      Topic Date Due    DTaP,Tdap,and Td Vaccines (1 - Tdap) 04/15/1966     Advance Directives   What are advance directives? Advance directives are legal documents that state your wishes and plans for medical care  These plans are made ahead of time in case you lose your ability to make decisions for yourself  Advance directives can apply to any medical decision, such as the treatments you want, and if you want to donate organs  What are the types of advance directives?   There are many types of advance directives, and each state has rules about how to use them  You may choose a combination of any of the following:  · Living will: This is a written record of the treatment you want  You can also choose which treatments you do not want, which to limit, and which to stop at a certain time  This includes surgery, medicine, IV fluid, and tube feedings  · Durable power of  for healthcare [facility]): This is a written record that states who you want to make healthcare choices for you when you are unable to make them for yourself  This person, called a proxy, is usually a family member or a friend  You may choose more than 1 proxy  · Do not resuscitate (DNR) order:  A DNR order is used in case your heart stops beating or you stop breathing  It is a request not to have certain forms of treatment, such as CPR  A DNR order may be included in other types of advance directives  · Medical directive: This covers the care that you want if you are in a coma, near death, or unable to make decisions for yourself  You can list the treatments you want for each condition  Treatment may include pain medicine, surgery, blood transfusions, dialysis, IV or tube feedings, and a ventilator (breathing machine)  · Values history: This document has questions about your views, beliefs, and how you feel and think about life  This information can help others choose the care that you would choose  Why are advance directives important? An advance directive helps you control your care  Although spoken wishes may be used, it is better to have your wishes written down  Spoken wishes can be misunderstood, or not followed  Treatments may be given even if you do not want them  An advance directive may make it easier for your family to make difficult choices about your care  Urinary Incontinence   Urinary incontinence (UI)  is when you lose control of your bladder   UI develops because your bladder cannot store or empty urine properly  The 3 most common types of UI are stress incontinence, urge incontinence, or both  Medicines:   · May be given to help strengthen your bladder control  Report any side effects of medication to your healthcare provider  Do pelvic muscle exercises often:  Your pelvic muscles help you stop urinating  Squeeze these muscles tight for 5 seconds, then relax for 5 seconds  Gradually work up to squeezing for 10 seconds  Do 3 sets of 15 repetitions a day, or as directed  This will help strengthen your pelvic muscles and improve bladder control  Train your bladder:  Go to the bathroom at set times, such as every 2 hours, even if you do not feel the urge to go  You can also try to hold your urine when you feel the urge to go  For example, hold your urine for 5 minutes when you feel the urge to go  As that becomes easier, hold your urine for 10 minutes  Self-care:   · Keep a UI record  Write down how often you leak urine and how much you leak  Make a note of what you were doing when you leaked urine  · Drink liquids as directed  You may need to limit the amount of liquid you drink to help control your urine leakage  Do not drink any liquid right before you go to bed  Limit or do not have drinks that contain caffeine or alcohol  · Prevent constipation  Eat a variety of high-fiber foods  Good examples are high-fiber cereals, beans, vegetables, and whole-grain breads  Walking is the best way to trigger your intestines to have a bowel movement  · Exercise regularly and maintain a healthy weight  Weight loss and exercise will decrease pressure on your bladder and help you control your leakage  · Use a catheter as directed  to help empty your bladder  A catheter is a tiny, plastic tube that is put into your bladder to drain your urine  · Go to behavior therapy as directed  Behavior therapy may be used to help you learn to control your urge to urinate      Weight Management   Why it is important to manage your weight:  Being overweight increases your risk of health conditions such as heart disease, high blood pressure, type 2 diabetes, and certain types of cancer  It can also increase your risk for osteoarthritis, sleep apnea, and other respiratory problems  Aim for a slow, steady weight loss  Even a small amount of weight loss can lower your risk of health problems  How to lose weight safely:  A safe and healthy way to lose weight is to eat fewer calories and get regular exercise  You can lose up about 1 pound a week by decreasing the number of calories you eat by 500 calories each day  Healthy meal plan for weight management:  A healthy meal plan includes a variety of foods, contains fewer calories, and helps you stay healthy  A healthy meal plan includes the following:  · Eat whole-grain foods more often  A healthy meal plan should contain fiber  Fiber is the part of grains, fruits, and vegetables that is not broken down by your body  Whole-grain foods are healthy and provide extra fiber in your diet  Some examples of whole-grain foods are whole-wheat breads and pastas, oatmeal, brown rice, and bulgur  · Eat a variety of vegetables every day  Include dark, leafy greens such as spinach, kale, rod greens, and mustard greens  Eat yellow and orange vegetables such as carrots, sweet potatoes, and winter squash  · Eat a variety of fruits every day  Choose fresh or canned fruit (canned in its own juice or light syrup) instead of juice  Fruit juice has very little or no fiber  · Eat low-fat dairy foods  Drink fat-free (skim) milk or 1% milk  Eat fat-free yogurt and low-fat cottage cheese  Try low-fat cheeses such as mozzarella and other reduced-fat cheeses  · Choose meat and other protein foods that are low in fat  Choose beans or other legumes such as split peas or lentils  Choose fish, skinless poultry (chicken or turkey), or lean cuts of red meat (beef or pork)   Before you cook meat or poultry, cut off any visible fat  · Use less fat and oil  Try baking foods instead of frying them  Add less fat, such as margarine, sour cream, regular salad dressing and mayonnaise to foods  Eat fewer high-fat foods  Some examples of high-fat foods include french fries, doughnuts, ice cream, and cakes  · Eat fewer sweets  Limit foods and drinks that are high in sugar  This includes candy, cookies, regular soda, and sweetened drinks  Exercise:  Exercise at least 30 minutes per day on most days of the week  Some examples of exercise include walking, biking, dancing, and swimming  You can also fit in more physical activity by taking the stairs instead of the elevator or parking farther away from stores  Ask your healthcare provider about the best exercise plan for you  © Copyright [employer] 2018 Information is for End User's use only and may not be sold, redistributed or otherwise used for commercial purposes   All illustrations and images included in CareNotes® are the copyrighted property of [employer]  or [address] [place] [address]

## 2021-05-14 NOTE — PROGRESS NOTES
Assessment and Plan:     Problem List Items Addressed This Visit        Endocrine    Subclinical hypothyroidism - Primary       Cardiovascular and Mediastinum    Essential hypertension       Other    Mixed hyperlipidemia           Preventive health issues were discussed with patient, and age appropriate screening tests were ordered as noted in patient's After Visit Summary  Personalized health advice and appropriate referrals for health education or preventive services given if needed, as noted in patient's After Visit Summary       History of Present Illness:     Patient presents for Medicare Annual Wellness visit    Patient Care Team:  Aide Alanis MD as PCP - General (Family Medicine)  Shannon Nicolas (Dermatology)  Cony Busch MD (Oncology)  Francine Rahman OD (Optometry)  Marietta De Leon MD (Colon and Rectal Surgery)  Yadira Dye DPM (Podiatry)  Maximino Mak MD (Orthopedic Surgery)     Problem List:     Patient Active Problem List   Diagnosis    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 ([address] )    Essential hypertension    Mixed hyperlipidemia    Subclinical hypothyroidism    Mild vitamin D deficiency    Osteopenia of multiple sites    Localized osteoarthritis of right hand    Iron deficiency anemia secondary to inadequate dietary iron intake    Class 1 obesity due to excess calories with serious comorbidity and body mass index (BMI) of 30 0 to 30 9 in adult    Trigger ring finger of right hand    Trigger middle finger of left hand      Past Medical and Surgical History:     Past Medical History:   Diagnosis Date    Diverticulosis     Hyperlipidemia     Hypertension     Osteoarthritis     Osteopenia      Past Surgical History:   Procedure Laterality Date    INDUCED       x2    KNEE ARTHROSCOPY      therapeutic     MASS EXCISION Right 2020    Procedure: RING TRIGGER FINGER INJECTION;  Surgeon: Yony Fields MD;  [facility]; Service: Orthopedics    NEUROPLASTY / TRANSPOSITION MEDIAN NERVE AT CARPAL TUNNEL      DE INCISE FINGER TENDON SHEATH Left 11/30/2020    Procedure: LONG TRIGGER FINGER RELEASE;  Surgeon: Ophelia Hemphill MD;  [facility];  Service: Orthopedics      Family History:     Family History   Problem Relation Age of Onset    Stroke Mother         syndrome    Hypertension Mother     Hypothyroidism Mother     Osteoarthritis Mother     Stroke Father         syndrome     Diabetes Father     Hypertension Father     Osteoarthritis Father     Diabetes Brother     Melanoma Sister     Squamous cell carcinoma Sister     Hypothyroidism Sister     No Known Problems Maternal Grandmother     No Known Problems Maternal Grandfather     No Known Problems Paternal Grandmother     No Known Problems Paternal Grandfather     No Known Problems Sister     No Known Problems Sister     No Known Problems Sister     Lung cancer Maternal Aunt 48    No Known Problems Maternal Aunt     No Known Problems Paternal Aunt     No Known Problems Paternal Aunt     No Known Problems Paternal Aunt       Social History:        Social History     Socioeconomic History    Marital status: /Civil Union     Spouse name: None    Number of children: None    Years of education: None    Highest education level: None   Occupational History    None   Social Needs    Financial resource strain: None    Food insecurity     Worry: None     Inability: None    Transportation needs     Medical: None     Non-medical: None   Tobacco Use    Smoking status: Former Smoker     Packs/day: 0 25     Years: 5 00     Pack years: 1 25     Types: Cigarettes    Smokeless tobacco: Never Used    Tobacco comment: 50 years ago   Substance and Sexual Activity    Alcohol use:  Yes     Alcohol/week: 2 0 standard drinks     Types: 2 Glasses of wine per week     Binge frequency: Daily or almost daily     Comment: social    Drug use: No    Sexual activity: None   Lifestyle    Physical activity     Days per week: None     Minutes per session: None    Stress: None   Relationships    Social connections     Talks on phone: None     Gets together: None     Attends Denominational service: None     Active member of club or organization: None     Attends meetings of clubs or organizations: None     Relationship status: None    Intimate partner violence     Fear of current or ex partner: None     Emotionally abused: None     Physically abused: None     Forced sexual activity: None   Other Topics Concern    None   Social History Narrative    Caffeine use     Exercising erratically       Medications and Allergies:     Current Outpatient Medications   Medication Sig Dispense Refill    alendronate (FOSAMAX) 70 mg tablet TAKE 1 TABLET BY MOUTH  EVERY 7 DAYS 12 tablet 3    ALPRAZolam (XANAX) 0 25 mg tablet Take 0 25 mg by mouth as needed for anxiety      ascorbic acid (VITAMIN C) 250 mg tablet Take 250 mg by mouth daily        aspirin 81 MG tablet Take 81 mg by mouth daily        Biotin 10 MG CAPS Take 1 capsule by mouth daily        Calcium Carbonate-Vitamin D (CALCIUM 600+D HIGH POTENCY) 600-400 MG-UNIT per tablet Take 1 tablet by mouth daily        cholecalciferol (VITAMIN D3) 1,000 units tablet Take 1,000 Units by mouth daily      coenzyme Q-10 100 MG capsule Take 100 mg by mouth daily        lisinopril (ZESTRIL) 10 mg tablet TAKE 1 TABLET BY MOUTH  DAILY 90 tablet 3    Magnesium Citrate 100 MG TABS Take 1 tablet by mouth daily        Omega-3 Fatty Acids ([employer] FISH OIL) 1000 MG CAPS Take by mouth 2 (two) times a day        rosuvastatin (CRESTOR) 20 MG tablet Take 1 tablet (20 mg total) by mouth daily 90 tablet 3     No current facility-administered medications for this visit        Allergies   Allergen Reactions    Penicillins Rash    Sulfamethoxazole-Trimethoprim Rash     Reaction Date: [id number];       Immunizations:     Immunization History Administered Date(s) Administered    Influenza Split High Dose Preservative Free IM 12/19/2013, 12/22/2014, 12/23/2015, 01/19/2017, 01/18/2018    Influenza, high dose seasonal 0 7 mL 10/18/2018, 01/28/2020    Influenza, seasonal, injectable 01/08/2013    Pneumococcal Conjugate 13-Valent 12/23/2015    Pneumococcal Polysaccharide PPV23 12/19/2013    SARS-CoV-2 / COVID-19 mRNA IM ([employer]) 02/15/2021, 03/07/2021    Td (adult), adsorbed 01/28/2014    Varicella 01/01/2010    Zoster 1945    Zoster Vaccine Recombinant 12/04/2019, 06/18/2020      Health Maintenance:         Topic Date Due    MAMMOGRAM  11/16/2021    Colonoscopy Surveillance  08/15/2024    Colorectal Cancer Screening  08/15/2029    Hepatitis C Screening  Completed         Topic Date Due    DTaP,Tdap,and Td Vaccines (1 - Tdap) [birth year]/1968      Medicare Health Risk Assessment:     /70   Pulse 62   Temp (!) 97 3 °F (36 3 °C)   Resp 16   Ht 5' (1 524 m)   Wt 72 6 kg (160 lb)   BMI 31 25 kg/m²      Rodger Medina is here for her Subsequent Wellness visit  Last Medicare Wellness visit information reviewed, patient interviewed and updates made to the record today  Health Risk Assessment:   Patient rates overall health as good  Patient feels that their physical health rating is slightly worse  Patient is satisfied with their life  Eyesight was rated as same  Hearing was rated as same  Patient feels that their emotional and mental health rating is same  Patients states they are sometimes angry  Patient states they are sometimes unusually tired/fatigued  Pain experienced in the last 7 days has been some  Patient's pain rating has been 5/10  Patient states that she has experienced no weight loss or gain in last 6 months  Weight gain and less energy then last year  Exercise 3-4 times a week  Depression Screening:   PHQ-2 Score: 2      Fall Risk Screening:    In the past year, patient has experienced: no history of falling in past year      Urinary Incontinence Screening:   Patient has leaked urine accidently in the last six months  Not a big concern    Home Safety:  Patient does not have trouble with stairs inside or outside of their home  Patient has working smoke alarms and has no working carbon monoxide detector  Home safety hazards include: none  Nutrition:   Current diet is Regular, Low Cholesterol and No Added Salt  Medications:   Patient is currently taking over-the-counter supplements  OTC medications include: see medication list  Patient is able to manage medications  Activities of Daily Living (ADLs)/Instrumental Activities of Daily Living (IADLs):   Walk and transfer into and out of bed and chair?: Yes  Dress and groom yourself?: Yes    Bathe or shower yourself?: Yes    Feed yourself? Yes  Do your laundry/housekeeping?: Yes  Manage your money, pay your bills and track your expenses?: Yes  Make your own meals?: Yes    Do your own shopping?: Yes    Previous Hospitalizations:   Any hospitalizations or ED visits within the last 12 months?: Yes    How many hospitalizations have you had in the last year?: 1-2    Hospitalization Comments:  Following surgery    Advance Care Planning:   Living will: Yes    Advanced directive: Yes      Cognitive Screening:   Provider or family/friend/caregiver concerned regarding cognition?: No    PREVENTIVE SCREENINGS      Cardiovascular Screening:    General: Screening Not Indicated and History Lipid Disorder      Diabetes Screening:     General: Screening Current      Colorectal Cancer Screening:     General: Screening Current      Breast Cancer Screening:     General: Screening Current      Cervical Cancer Screening:    General: Screening Not Indicated      Abdominal Aortic Aneurysm (AAA) Screening:        General: Screening Not Indicated      Lung Cancer Screening:     General: Screening Not Indicated      Hepatitis C Screening:    General: Screening Current    Screening, Brief Intervention, and Referral to Treatment (SBIRT)    Screening  Typical number of drinks in a day: 1  Typical number of drinks in a week: 4  Interpretation: Low risk drinking behavior        Dajuan Miguel PA-C

## 2021-05-14 NOTE — PROGRESS NOTES
Assessment/Plan:    Subclinical hypothyroidism  Lab Results   Component Value Date    [id number] 4 740 (H) 01/30/2020    TSH 3 280 01/24/2019   Normal TSH in 2019  - Repeat TSH    Essential hypertension  BP Readings from Last 3 Encounters:   05/14/21 134/70   04/08/21 149/70   02/22/21 159/74   Currently managed on lisinopril 10 mg    Mixed hyperlipidemia  20 1% 10 year ASCVD risk score  Currently managed on Crestor 20 mg    Class 1 obesity due to excess calories with serious comorbidity and body mass index (BMI) of 30 0 to 30 9 in adult  Wt Readings from Last 3 Encounters:   05/14/21 72 6 kg (160 lb)   04/08/21 71 7 kg (158 lb)   02/22/21 71 7 kg (158 lb)      Acute pain of right shoulder  Prior frozen shoulder managed with at home PT exercises  Prior Xray 2016 was WNL with only minor degenerative changes  - Repeat Xray  Discussed may need to FU wit hPT  -     XR shoulder 2+ vw right; Future        BMI Counseling: Body mass index is 31 25 kg/m²  The BMI is above normal  Nutrition recommendations include decreasing portion sizes, encouraging healthy choices of fruits and vegetables, limiting drinks that contain sugar and reducing intake of cholesterol  Exercise recommendations include moderate physical activity 150 minutes/week  No pharmacotherapy was ordered  Subjective:    Patient ID: Zari Live is a 78 y o  female  Pt is presenting today for HPI Annual FU of HTN, HLD, subclinical hypothyrodism    Right shoulder pain for 1 month  Prior issue 5 years ago  Uses Xanax occasionally  Last refill 3 years ago  The following portions of the patient's history were reviewed and updated as appropriate: allergies, current medications, past family history, past medical history, past social history, past surgical history and problem list     Review of Systems   Constitutional: Negative for activity change, chills, fatigue, fever and unexpected weight change  Eyes: Negative for visual disturbance  Respiratory: Negative for cough, shortness of breath and wheezing  Cardiovascular: Negative for chest pain, palpitations and leg swelling  Gastrointestinal: Negative for abdominal pain, constipation, diarrhea and nausea  Musculoskeletal: Positive for arthralgias (right shoulder)  Negative for myalgias  Skin: Negative for pallor, rash and wound  See [facility] for precancerous lesions, on left leg and chset   Allergic/Immunologic: Negative for environmental allergies and food allergies  Neurological: Negative for dizziness and headaches  Psychiatric/Behavioral: Negative for behavioral problems, dysphoric mood and sleep disturbance  The patient is not nervous/anxious  Objective:  /70   Pulse 62   Temp (!) 97 3 °F (36 3 °C)   Resp 16   Ht 5' (1 524 m)   Wt 72 6 kg (160 lb)   BMI 31 25 kg/m²      Physical Exam  Vitals signs reviewed  Constitutional:       General: She is not in acute distress  Appearance: She is well-developed  She is not diaphoretic  HENT:      Head: Normocephalic and atraumatic  Eyes:      Pupils: Pupils are equal, round, and reactive to light  Neck:      Musculoskeletal: Normal range of motion and neck supple  Cardiovascular:      Rate and Rhythm: Normal rate and regular rhythm  Heart sounds: Normal heart sounds  No murmur  No friction rub  No gallop  Pulmonary:      Effort: Pulmonary effort is normal  No respiratory distress  Breath sounds: Normal breath sounds  No wheezing or rales  Musculoskeletal:      Right shoulder: She exhibits pain  She exhibits normal range of motion, no tenderness, no deformity and no spasm  Comments: Negative sulcus sign, empty can test, Bicipital tendinitis or apprehension/reduction test   Skin:     General: Skin is warm and dry  Neurological:      Mental Status: She is alert and oriented to person, place, and time     Psychiatric:         Behavior: Behavior normal          Thought Content:  Thought content normal

## 2021-05-14 NOTE — ASSESSMENT & PLAN NOTE
Wt Readings from Last 3 Encounters:   05/14/21 72 6 kg (160 lb)   04/08/21 71 7 kg (158 lb)   02/22/21 71 7 kg (158 lb)

## 2021-05-14 NOTE — ASSESSMENT & PLAN NOTE
BP Readings from Last 3 Encounters:   05/14/21 134/70   04/08/21 149/70   02/22/21 159/74   Currently managed on lisinopril 10 mg

## 2021-05-14 NOTE — ASSESSMENT & PLAN NOTE
Lab Results   Component Value Date    [id number] 4 740 (H) 01/30/2020    TSH 3 280 01/24/2019   Normal TSH in 2019  - Repeat TSH

## 2021-05-19 ENCOUNTER — HOSPITAL ENCOUNTER (OUTPATIENT)
Dept: ULTRASOUND IMAGING | Facility: CLINIC | Age: 76
Discharge: HOME | End: 2021-05-19
Payer: COMMERCIAL

## 2021-05-19 VITALS — WEIGHT: 155 LBS | BODY MASS INDEX: 30.43 KG/M2 | HEIGHT: 60 IN

## 2021-05-19 DIAGNOSIS — R92.8 ABNORMAL FINDINGS ON DIAGNOSTIC IMAGING OF BREAST: ICD-10-CM

## 2021-05-19 PROCEDURE — 76642 ULTRASOUND BREAST LIMITED: CPT

## 2021-10-21 ENCOUNTER — TELEPHONE (OUTPATIENT)
Dept: FAMILY MEDICINE CLINIC | Facility: CLINIC | Age: 76
End: 2021-10-21

## 2021-11-18 ENCOUNTER — HOSPITAL ENCOUNTER (OUTPATIENT)
Dept: RADIOLOGY | Facility: HOSPITAL | Age: 76
Discharge: HOME | End: 2021-11-18
Payer: COMMERCIAL

## 2021-11-18 DIAGNOSIS — M25.511 ACUTE PAIN OF RIGHT SHOULDER: ICD-10-CM

## 2021-11-18 PROCEDURE — 73030 X-RAY EXAM OF SHOULDER: CPT

## 2021-12-15 DIAGNOSIS — M85.89 OSTEOPENIA OF MULTIPLE SITES: ICD-10-CM

## 2021-12-16 RX ORDER — ALENDRONATE SODIUM 70 MG/1
70 TABLET ORAL
Qty: 12 TABLET | Refills: 3 | Status: SHIPPED | OUTPATIENT
Start: 2021-12-16

## 2021-12-16 RX ORDER — ALENDRONATE SODIUM 70 MG/1
TABLET ORAL
Qty: 12 TABLET | Refills: 3 | Status: CANCELLED | OUTPATIENT
Start: 2021-12-16

## 2021-12-16 RX ORDER — ALPRAZOLAM 0.25 MG/1
0.25 TABLET ORAL AS NEEDED
Qty: 90 TABLET | Refills: 0 | Status: CANCELLED | OUTPATIENT
Start: 2021-12-16

## 2021-12-20 ENCOUNTER — OFFICE VISIT (OUTPATIENT)
Dept: FAMILY MEDICINE CLINIC | Facility: CLINIC | Age: 76
End: 2021-12-20
Payer: COMMERCIAL

## 2021-12-20 VITALS
TEMPERATURE: 97 F | HEART RATE: 65 BPM | DIASTOLIC BLOOD PRESSURE: 80 MMHG | BODY MASS INDEX: 30.43 KG/M2 | SYSTOLIC BLOOD PRESSURE: 138 MMHG | RESPIRATION RATE: 18 BRPM | WEIGHT: 155 LBS | HEIGHT: 60 IN | OXYGEN SATURATION: 96 %

## 2021-12-20 DIAGNOSIS — D50.8 IRON DEFICIENCY ANEMIA SECONDARY TO INADEQUATE DIETARY IRON INTAKE: ICD-10-CM

## 2021-12-20 DIAGNOSIS — E03.8 SUBCLINICAL HYPOTHYROIDISM: Primary | ICD-10-CM

## 2021-12-20 DIAGNOSIS — M65.341 TRIGGER RING FINGER OF RIGHT HAND: ICD-10-CM

## 2021-12-20 DIAGNOSIS — M85.89 OSTEOPENIA OF MULTIPLE SITES: ICD-10-CM

## 2021-12-20 DIAGNOSIS — M25.551 HIP PAIN, RIGHT: ICD-10-CM

## 2021-12-20 DIAGNOSIS — I10 ESSENTIAL HYPERTENSION: ICD-10-CM

## 2021-12-20 DIAGNOSIS — D03.72 MELANOMA IN SITU OF LEFT LOWER EXTREMITY (HCC): ICD-10-CM

## 2021-12-20 DIAGNOSIS — F51.09 SITUATIONAL INSOMNIA: ICD-10-CM

## 2021-12-20 DIAGNOSIS — E78.2 MIXED HYPERLIPIDEMIA: ICD-10-CM

## 2021-12-20 DIAGNOSIS — E66.09 CLASS 1 OBESITY DUE TO EXCESS CALORIES WITH SERIOUS COMORBIDITY AND BODY MASS INDEX (BMI) OF 30.0 TO 30.9 IN ADULT: ICD-10-CM

## 2021-12-20 PROBLEM — E55.9 MILD VITAMIN D DEFICIENCY: Status: RESOLVED | Noted: 2017-01-17 | Resolved: 2021-12-20

## 2021-12-20 PROCEDURE — 3008F BODY MASS INDEX DOCD: CPT | Performed by: FAMILY MEDICINE

## 2021-12-20 PROCEDURE — 3075F SYST BP GE 130 - 139MM HG: CPT | Performed by: FAMILY MEDICINE

## 2021-12-20 PROCEDURE — 1036F TOBACCO NON-USER: CPT | Performed by: FAMILY MEDICINE

## 2021-12-20 PROCEDURE — 99214 OFFICE O/P EST MOD 30 MIN: CPT | Performed by: FAMILY MEDICINE

## 2021-12-20 PROCEDURE — 3079F DIAST BP 80-89 MM HG: CPT | Performed by: FAMILY MEDICINE

## 2021-12-20 PROCEDURE — 1160F RVW MEDS BY RX/DR IN RCRD: CPT | Performed by: FAMILY MEDICINE

## 2021-12-20 RX ORDER — ZINC GLUCONATE 50 MG
50 TABLET ORAL DAILY
COMMUNITY

## 2021-12-20 RX ORDER — LISINOPRIL 10 MG/1
10 TABLET ORAL DAILY
Qty: 90 TABLET | Refills: 3 | Status: SHIPPED | OUTPATIENT
Start: 2021-12-20

## 2022-01-25 ENCOUNTER — HOSPITAL ENCOUNTER (OUTPATIENT)
Dept: RADIOLOGY | Age: 77
Discharge: HOME | End: 2022-01-25
Payer: COMMERCIAL

## 2022-01-25 VITALS — HEIGHT: 59 IN | BODY MASS INDEX: 30.84 KG/M2 | WEIGHT: 153 LBS

## 2022-01-25 DIAGNOSIS — Z12.31 ENCOUNTER FOR SCREENING MAMMOGRAM FOR MALIGNANT NEOPLASM OF BREAST: ICD-10-CM

## 2022-01-25 PROCEDURE — 77063 BREAST TOMOSYNTHESIS BI: CPT

## 2022-01-25 PROCEDURE — 77067 SCR MAMMO BI INCL CAD: CPT

## 2022-03-22 DIAGNOSIS — E78.01 FAMILIAL HYPERCHOLESTEROLEMIA: ICD-10-CM

## 2022-03-22 RX ORDER — ROSUVASTATIN CALCIUM 20 MG/1
20 TABLET, COATED ORAL DAILY
Qty: 90 TABLET | Refills: 3 | Status: SHIPPED | OUTPATIENT
Start: 2022-03-22

## 2022-05-13 ENCOUNTER — APPOINTMENT (OUTPATIENT)
Dept: LAB | Facility: CLINIC | Age: 77
End: 2022-05-13
Payer: COMMERCIAL

## 2022-05-13 DIAGNOSIS — I10 ESSENTIAL HYPERTENSION: ICD-10-CM

## 2022-05-13 DIAGNOSIS — E78.2 MIXED HYPERLIPIDEMIA: ICD-10-CM

## 2022-05-13 DIAGNOSIS — E03.8 SUBCLINICAL HYPOTHYROIDISM: ICD-10-CM

## 2022-05-13 LAB
ANION GAP SERPL CALCULATED.3IONS-SCNC: 6 MMOL/L (ref 4–13)
BUN SERPL-MCNC: 21 MG/DL (ref 5–25)
CALCIUM SERPL-MCNC: 8.9 MG/DL (ref 8.3–10.1)
CHLORIDE SERPL-SCNC: 105 MMOL/L (ref 100–108)
CHOLEST SERPL-MCNC: 146 MG/DL
CO2 SERPL-SCNC: 27 MMOL/L (ref 21–32)
CREAT SERPL-MCNC: 1.02 MG/DL (ref 0.6–1.3)
GFR SERPL CREATININE-BSD FRML MDRD: 53 ML/MIN/1.73SQ M
GLUCOSE P FAST SERPL-MCNC: 97 MG/DL (ref 65–99)
HDLC SERPL-MCNC: 51 MG/DL
LDLC SERPL CALC-MCNC: 77 MG/DL (ref 0–100)
NONHDLC SERPL-MCNC: 95 MG/DL
POTASSIUM SERPL-SCNC: 4 MMOL/L (ref 3.5–5.3)
SODIUM SERPL-SCNC: 138 MMOL/L (ref 136–145)
TRIGL SERPL-MCNC: 88 MG/DL
TSH SERPL DL<=0.05 MIU/L-ACNC: 2.49 UIU/ML (ref 0.45–4.5)

## 2022-05-13 PROCEDURE — 84443 ASSAY THYROID STIM HORMONE: CPT

## 2022-05-13 PROCEDURE — 80061 LIPID PANEL: CPT

## 2022-05-13 PROCEDURE — 36415 COLL VENOUS BLD VENIPUNCTURE: CPT

## 2022-05-13 PROCEDURE — 80048 BASIC METABOLIC PNL TOTAL CA: CPT

## 2022-10-24 DIAGNOSIS — M85.89 OSTEOPENIA OF MULTIPLE SITES: ICD-10-CM

## 2022-10-24 RX ORDER — ALENDRONATE SODIUM 70 MG/1
70 TABLET ORAL
Qty: 12 TABLET | Refills: 3 | Status: SHIPPED | OUTPATIENT
Start: 2022-10-24

## 2022-10-25 ENCOUNTER — OFFICE VISIT (OUTPATIENT)
Dept: FAMILY MEDICINE CLINIC | Facility: CLINIC | Age: 77
End: 2022-10-25
Payer: COMMERCIAL

## 2022-10-25 VITALS
SYSTOLIC BLOOD PRESSURE: 120 MMHG | OXYGEN SATURATION: 98 % | DIASTOLIC BLOOD PRESSURE: 80 MMHG | HEART RATE: 70 BPM | HEIGHT: 60 IN | WEIGHT: 152 LBS | BODY MASS INDEX: 29.84 KG/M2 | TEMPERATURE: 97.8 F
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
## 2022-10-25 DIAGNOSIS — S80.862A TICK BITE OF LEFT LOWER LEG, INITIAL ENCOUNTER: Primary | ICD-10-CM

## 2022-10-25 PROCEDURE — 99213 OFFICE O/P EST LOW 20 MIN: CPT | Performed by: FAMILY MEDICINE

## 2022-10-25 RX ORDER — BACILLUS COAGULANS/INULIN 1B-250 MG
CAPSULE ORAL
COMMUNITY
Start: 2021-09-02

## 2022-10-25 RX ORDER — DOXYCYCLINE HYCLATE 100 MG/1
200 CAPSULE ORAL EVERY 12 HOURS SCHEDULED
Qty: 2 CAPSULE | Refills: 0 | Status: SHIPPED | OUTPATIENT
Start: 2022-10-25 | End: 2022-10-26

## 2022-10-25 RX ORDER — DOXYCYCLINE HYCLATE 100 MG/1
200 CAPSULE ORAL EVERY 12 HOURS SCHEDULED
Qty: 2 CAPSULE | Refills: 0 | Status: SHIPPED | OUTPATIENT
Start: 2022-10-25 | End: 2022-10-25

## 2022-10-25 NOTE — PROGRESS NOTES
Name: Octavia Haque      :       MRN: [id number]  Encounter Provider: Adin Anguiano MD  Encounter Date: 10/25/2022   Encounter department: [address] [place] [address] St     1  Tick bite of left lower leg, initial encounter  -     doxycycline hyclate (VIBRAMYCIN) 100 mg capsule; Take 2 capsules (200 mg total) by mouth every 12 (twelve) hours for 1 dose  Tick appears to be fully removed  There was a small area of bruising  Will give 1 prophylactic dose of doxycycline 200 mg  Has an appointment in 1 week with Dr Corwin Goodpasture      Patient removed a tick from her left leg this past   States the tick was only there for about half a day  She applied Vaseline waited 5 minutes and removed to take with tweezers  She feels she got all of the tick  There is a small area of redness and bruising on her left lower leg  Mild itching last night  There does not appear to be any fragments from the tick remaining  Review of Systems   Skin: Positive for color change  Redness and bruising left lower leg         Current Outpatient Medications on File Prior to Visit   Medication Sig   • alendronate (FOSAMAX) 70 mg tablet Take 1 tablet (70 mg total) by mouth every 7 days   • Bacillus Coagulans-Inulin (Probiotic) 1-250 BILLION-MG CAPS    • Garlic 7402 MG CAPS    • ascorbic acid (VITAMIN C) 250 mg tablet Take 250 mg by mouth daily     • Biotin 10 MG CAPS Take 1 capsule by mouth daily     • Calcium Carbonate-Vitamin D (CALCIUM 600+D HIGH POTENCY) 600-400 MG-UNIT per tablet Take 1 tablet by mouth daily     • cholecalciferol (VITAMIN D3) 1,000 units tablet Take 1,000 Units by mouth daily   • coenzyme Q-10 100 MG capsule Take 100 mg by mouth daily     • lisinopril (ZESTRIL) 10 mg tablet Take 1 tablet (10 mg total) by mouth daily   • Magnesium Citrate 100 MG TABS Take 1 tablet by mouth daily     • NON FORMULARY Take 1 capsule by mouth in the morning Dr Curtis Liu Joint Formula   • Omega-3 Fatty Acids ([employer]S FISH OIL) 1000 MG CAPS Take by mouth 2 (two) times a day     • rosuvastatin (CRESTOR) 20 MG tablet Take 1 tablet (20 mg total) by mouth daily   • zinc gluconate 50 mg tablet Take 50 mg by mouth daily   • [DISCONTINUED] alendronate (FOSAMAX) 70 mg tablet Take 1 tablet (70 mg total) by mouth every 7 days   • [DISCONTINUED] ALPRAZolam (XANAX) 0 25 mg tablet Take 0 25 mg by mouth as needed for anxiety       Objective     /80 (BP Location: Left arm, Patient Position: Sitting, Cuff Size: Standard)   Pulse 70   Temp 97 8 °F (36 6 °C)   Ht 5' (1 524 m)   Wt 68 9 kg (152 lb)   SpO2 98%   BMI 29 69 kg/m²     Physical Exam  Vitals and nursing note reviewed  Constitutional:       Appearance: Normal appearance  Skin:     Comments: Erythema and bruising left lower leg  No tick fragments present   Neurological:      Mental Status: She is alert         Celine Berg MD

## 2022-10-31 ENCOUNTER — RA CDI HCC (OUTPATIENT)
Dept: OTHER | Facility: HOSPITAL | Age: 77
End: 2022-10-31

## 2022-10-31 NOTE — PROGRESS NOTES
[facility]  coding opportunities       Chart reviewed, no opportunity found:   [address]        Patients Insurance     Medicare Insurance: [employer]

## 2022-11-02 ENCOUNTER — OFFICE VISIT (OUTPATIENT)
Dept: FAMILY MEDICINE CLINIC | Facility: CLINIC | Age: 77
End: 2022-11-02

## 2022-11-02 VITALS
SYSTOLIC BLOOD PRESSURE: 138 MMHG | HEART RATE: 70 BPM | BODY MASS INDEX: 29.45 KG/M2 | WEIGHT: 150 LBS | DIASTOLIC BLOOD PRESSURE: 80 MMHG | HEIGHT: 60 IN | OXYGEN SATURATION: 98 % | TEMPERATURE: 96.9 F | RESPIRATION RATE: 16 BRPM

## 2022-11-02 DIAGNOSIS — M85.89 OSTEOPENIA OF MULTIPLE SITES: ICD-10-CM

## 2022-11-02 DIAGNOSIS — E03.8 SUBCLINICAL HYPOTHYROIDISM: ICD-10-CM

## 2022-11-02 DIAGNOSIS — Z13.89 ENCOUNTER FOR SCREENING FOR OTHER DISORDER: ICD-10-CM

## 2022-11-02 DIAGNOSIS — I10 PRIMARY HYPERTENSION: ICD-10-CM

## 2022-11-02 DIAGNOSIS — M65.332 TRIGGER MIDDLE FINGER OF LEFT HAND: ICD-10-CM

## 2022-11-02 DIAGNOSIS — Z23 ENCOUNTER FOR IMMUNIZATION: ICD-10-CM

## 2022-11-02 DIAGNOSIS — Z00.00 MEDICARE ANNUAL WELLNESS VISIT, SUBSEQUENT: Primary | ICD-10-CM

## 2022-11-02 DIAGNOSIS — Z12.31 ENCOUNTER FOR SCREENING MAMMOGRAM FOR BREAST CANCER: ICD-10-CM

## 2022-11-02 DIAGNOSIS — D03.72 MELANOMA IN SITU OF LEFT LOWER EXTREMITY (HCC): ICD-10-CM

## 2022-11-02 DIAGNOSIS — N39.3 FEMALE STRESS INCONTINENCE: ICD-10-CM

## 2022-11-02 PROBLEM — M65.341 TRIGGER RING FINGER OF RIGHT HAND: Status: RESOLVED | Noted: 2020-11-30 | Resolved: 2022-11-02

## 2022-11-02 PROBLEM — D50.8 IRON DEFICIENCY ANEMIA SECONDARY TO INADEQUATE DIETARY IRON INTAKE: Status: RESOLVED | Noted: 2019-01-23 | Resolved: 2022-11-02

## 2022-11-02 RX ORDER — ELECTROLYTES/DEXTROSE
1 SOLUTION, ORAL ORAL DAILY
COMMUNITY

## 2022-11-02 RX ORDER — TURMERIC ROOT EXTRACT 500 MG
1 TABLET ORAL DAILY
COMMUNITY

## 2022-11-02 NOTE — ASSESSMENT & PLAN NOTE
Lab Results   Component Value Date    CHOL 164 11/12/2015    HDL 51 05/13/2022    LDLCALC 77 05/13/2022    TRIG 88 05/13/2022     Controlled, continue rosuvastatin 20 mg daily  Counseled on healthy lifestyle

## 2022-11-02 NOTE — ASSESSMENT & PLAN NOTE
BP Readings from Last 3 Encounters:   11/02/22 138/80   10/25/22 120/80   12/20/21 138/80     Blood pressure controlled, continue lisinopril 10 mg daily

## 2022-11-02 NOTE — PATIENT INSTRUCTIONS
Medicare Preventive Visit Patient Instructions  Thank you for completing your Welcome to Medicare Visit or Medicare Annual Wellness Visit today  Your next wellness visit will be due in one year (11/3/2023)  The screening/preventive services that you may require over the next 5-10 years are detailed below  Some tests may not apply to you based off risk factors and/or age  Screening tests ordered at today's visit but not completed yet may show as past due  Also, please note that scanned in results may not display below  Preventive Screenings:  Service Recommendations Previous Testing/Comments   Colorectal Cancer Screening  * Colonoscopy    * Fecal Occult Blood Test (FOBT)/Fecal Immunochemical Test (FIT)  * Fecal DNA/Cologuard Test  * Flexible Sigmoidoscopy Age: 39-70 years old   Colonoscopy: every 10 years (may be performed more frequently if at higher risk)  OR  FOBT/FIT: every 1 year  OR  Cologuard: every 3 years  OR  Sigmoidoscopy: every 5 years  Screening may be recommended earlier than age 39 if at higher risk for colorectal cancer  Also, an individualized decision between you and your healthcare provider will decide whether screening between the ages of 74-80 would be appropriate  Colonoscopy: 08/15/2019  FOBT/FIT: Not on file  Cologuard: Not on file  Sigmoidoscopy: Not on file    Screening Current     Breast Cancer Screening Age: 36 years old  Frequency: every 1-2 years  Not required if history of left and right mastectomy Mammogram: 01/25/2022    Screening Current   Cervical Cancer Screening Between the ages of 21-29, pap smear recommended once every 3 years  Between the ages of 33-67, can perform pap smear with HPV co-testing every 5 years     Recommendations may differ for women with a history of total hysterectomy, cervical cancer, or abnormal pap smears in past  Pap Smear: 02/19/2020    Screening Not Indicated   Hepatitis C Screening Once for adults born between 1945 and 1965  More frequently in patients at high risk for Hepatitis C Hep C Antibody: 01/24/2019    Screening Current   Diabetes Screening 1-2 times per year if you're at risk for diabetes or have pre-diabetes Fasting glucose: 97 mg/dL (5/13/2022)  A1C: No results in last 5 years (No results in last 5 years)  Screening Current   Cholesterol Screening Once every 5 years if you don't have a lipid disorder  May order more often based on risk factors  Lipid panel: 05/13/2022    Screening Not Indicated  History Lipid Disorder     Other Preventive Screenings Covered by Medicare:  1  Abdominal Aortic Aneurysm (AAA) Screening: covered once if your at risk  You're considered to be at risk if you have a family history of AAA  2  Lung Cancer Screening: covers low dose CT scan once per year if you meet all of the following conditions: (1) Age 50-69; (2) No signs or symptoms of lung cancer; (3) Current smoker or have quit smoking within the last 15 years; (4) You have a tobacco smoking history of at least 20 pack years (packs per day multiplied by number of years you smoked); (5) You get a written order from a healthcare provider  3  Glaucoma Screening: covered annually if you're considered high risk: (1) You have diabetes OR (2) Family history of glaucoma OR (3)  aged 48 and older OR (3)  American aged 72 and older  3  Osteoporosis Screening: covered every 2 years if you meet one of the following conditions: (1) You're estrogen deficient and at risk for osteoporosis based off medical history and other findings; (2) Have a vertebral abnormality; (3) On glucocorticoid therapy for more than 3 months; (4) Have primary hyperparathyroidism; (5) On osteoporosis medications and need to assess response to drug therapy  · Last bone density test (DXA Scan): 03/08/2019   5  HIV Screening: covered annually if you're between the age of 15-65  Also covered annually if you are younger than 13 and older than 72 with risk factors for HIV infection  For pregnant patients, it is covered up to 3 times per pregnancy  Immunizations:  Immunization Recommendations   Influenza Vaccine Annual influenza vaccination during flu season is recommended for all persons aged >= 6 months who do not have contraindications   Pneumococcal Vaccine   * Pneumococcal conjugate vaccine = PCV13 (Prevnar 13), PCV15 (Vaxneuvance), PCV20 (Prevnar 20)  * Pneumococcal polysaccharide vaccine = PPSV23 (Pneumovax) Adults 25-60 years old: 1-3 doses may be recommended based on certain risk factors  Adults 72 years old: 1-2 doses may be recommended based off what pneumonia vaccine you previously received   Hepatitis B Vaccine 3 dose series if at intermediate or high risk (ex: diabetes, end stage renal disease, liver disease)   Tetanus (Td) Vaccine - COST NOT COVERED BY MEDICARE PART B Following completion of primary series, a booster dose should be given every 10 years to maintain immunity against tetanus  Td may also be given as tetanus wound prophylaxis  Tdap Vaccine - COST NOT COVERED BY MEDICARE PART B Recommended at least once for all adults  For pregnant patients, recommended with each pregnancy  Shingles Vaccine (Shingrix) - COST NOT COVERED BY MEDICARE PART B  2 shot series recommended in those aged 48 and above     Health Maintenance Due:      Topic Date Due   • Breast Cancer Screening: Mammogram  01/25/2023   • Colorectal Cancer Screening  08/15/2024   • Hepatitis C Screening  Completed     Immunizations Due:      Topic Date Due   • COVID-19 Vaccine (3 - Booster for Pfizer series) 08/07/2021   • Influenza Vaccine (1) 09/01/2022     Advance Directives   What are advance directives? Advance directives are legal documents that state your wishes and plans for medical care  These plans are made ahead of time in case you lose your ability to make decisions for yourself   Advance directives can apply to any medical decision, such as the treatments you want, and if you want to donate organs  What are the types of advance directives? There are many types of advance directives, and each state has rules about how to use them  You may choose a combination of any of the following:  · Living will: This is a written record of the treatment you want  You can also choose which treatments you do not want, which to limit, and which to stop at a certain time  This includes surgery, medicine, IV fluid, and tube feedings  · Durable power of  for healthcare [facility]): This is a written record that states who you want to make healthcare choices for you when you are unable to make them for yourself  This person, called a proxy, is usually a family member or a friend  You may choose more than 1 proxy  · Do not resuscitate (DNR) order:  A DNR order is used in case your heart stops beating or you stop breathing  It is a request not to have certain forms of treatment, such as CPR  A DNR order may be included in other types of advance directives  · Medical directive: This covers the care that you want if you are in a coma, near death, or unable to make decisions for yourself  You can list the treatments you want for each condition  Treatment may include pain medicine, surgery, blood transfusions, dialysis, IV or tube feedings, and a ventilator (breathing machine)  · Values history: This document has questions about your views, beliefs, and how you feel and think about life  This information can help others choose the care that you would choose  Why are advance directives important? An advance directive helps you control your care  Although spoken wishes may be used, it is better to have your wishes written down  Spoken wishes can be misunderstood, or not followed  Treatments may be given even if you do not want them  An advance directive may make it easier for your family to make difficult choices about your care     Fall Prevention    Fall prevention  includes ways to make your home and other areas safer  It also includes ways you can move more carefully to prevent a fall  Health conditions that cause changes in your blood pressure, vision, or muscle strength and coordination may increase your risk for falls  Medicines may also increase your risk for falls if they make you dizzy, weak, or sleepy  Fall prevention tips:   · Stand or sit up slowly  · Use assistive devices as directed  · Wear shoes that fit well and have soles that   · Wear a personal alarm  · Stay active  · Manage your medical conditions  Home Safety Tips:  · Add items to prevent falls in the bathroom  · Keep paths clear  · Install bright lights in your home  · Keep items you use often on shelves within reach  · Paint or place reflective tape on the edges of your stairs  Urinary Incontinence   Urinary incontinence (UI)  is when you lose control of your bladder  UI develops because your bladder cannot store or empty urine properly  The 3 most common types of UI are stress incontinence, urge incontinence, or both  Medicines:   · May be given to help strengthen your bladder control  Report any side effects of medication to your healthcare provider  Do pelvic muscle exercises often:  Your pelvic muscles help you stop urinating  Squeeze these muscles tight for 5 seconds, then relax for 5 seconds  Gradually work up to squeezing for 10 seconds  Do 3 sets of 15 repetitions a day, or as directed  This will help strengthen your pelvic muscles and improve bladder control  Train your bladder:  Go to the bathroom at set times, such as every 2 hours, even if you do not feel the urge to go  You can also try to hold your urine when you feel the urge to go  For example, hold your urine for 5 minutes when you feel the urge to go  As that becomes easier, hold your urine for 10 minutes  Self-care:   · Keep a UI record  Write down how often you leak urine and how much you leak   Make a note of what you were doing when you leaked urine  · Drink liquids as directed  You may need to limit the amount of liquid you drink to help control your urine leakage  Do not drink any liquid right before you go to bed  Limit or do not have drinks that contain caffeine or alcohol  · Prevent constipation  Eat a variety of high-fiber foods  Good examples are high-fiber cereals, beans, vegetables, and whole-grain breads  Walking is the best way to trigger your intestines to have a bowel movement  · Exercise regularly and maintain a healthy weight  Weight loss and exercise will decrease pressure on your bladder and help you control your leakage  · Use a catheter as directed  to help empty your bladder  A catheter is a tiny, plastic tube that is put into your bladder to drain your urine  · Go to behavior therapy as directed  Behavior therapy may be used to help you learn to control your urge to urinate  Weight Management   Why it is important to manage your weight:  Being overweight increases your risk of health conditions such as heart disease, high blood pressure, type 2 diabetes, and certain types of cancer  It can also increase your risk for osteoarthritis, sleep apnea, and other respiratory problems  Aim for a slow, steady weight loss  Even a small amount of weight loss can lower your risk of health problems  How to lose weight safely:  A safe and healthy way to lose weight is to eat fewer calories and get regular exercise  You can lose up about 1 pound a week by decreasing the number of calories you eat by 500 calories each day  Healthy meal plan for weight management:  A healthy meal plan includes a variety of foods, contains fewer calories, and helps you stay healthy  A healthy meal plan includes the following:  · Eat whole-grain foods more often  A healthy meal plan should contain fiber  Fiber is the part of grains, fruits, and vegetables that is not broken down by your body   Whole-grain foods are healthy and provide extra fiber in your diet  Some examples of whole-grain foods are whole-wheat breads and pastas, oatmeal, brown rice, and bulgur  · Eat a variety of vegetables every day  Include dark, leafy greens such as spinach, kale, rod greens, and mustard greens  Eat yellow and orange vegetables such as carrots, sweet potatoes, and winter squash  · Eat a variety of fruits every day  Choose fresh or canned fruit (canned in its own juice or light syrup) instead of juice  Fruit juice has very little or no fiber  · Eat low-fat dairy foods  Drink fat-free (skim) milk or 1% milk  Eat fat-free yogurt and low-fat cottage cheese  Try low-fat cheeses such as mozzarella and other reduced-fat cheeses  · Choose meat and other protein foods that are low in fat  Choose beans or other legumes such as split peas or lentils  Choose fish, skinless poultry (chicken or turkey), or lean cuts of red meat (beef or pork)  Before you cook meat or poultry, cut off any visible fat  · Use less fat and oil  Try baking foods instead of frying them  Add less fat, such as margarine, sour cream, regular salad dressing and mayonnaise to foods  Eat fewer high-fat foods  Some examples of high-fat foods include french fries, doughnuts, ice cream, and cakes  · Eat fewer sweets  Limit foods and drinks that are high in sugar  This includes candy, cookies, regular soda, and sweetened drinks  Exercise:  Exercise at least 30 minutes per day on most days of the week  Some examples of exercise include walking, biking, dancing, and swimming  You can also fit in more physical activity by taking the stairs instead of the elevator or parking farther away from stores  Ask your healthcare provider about the best exercise plan for you  Alcohol Use and Your Health    Drinking too much can harm your health  Excessive alcohol use leads to about 88,000 death in the [place] each year, and shortens the life of those who diet by almost 30 years    Further, excessive drinking cost the economy $249 billion in 2010  Most excessive drinkers are not alcohol dependent  Excessive alcohol use has immediate effects that increase the risk of many harmful health conditions  These are most often the result of binge drinking  Over time, excessive alcohol use can lead to the development of chronic diseases and other series health problems  What is considered a "drink"? Excessive alcohol use includes:  · Binge Drinking: For women, 4 or more drinks consumed on one occasion  For men, 5 or more drinks consumed on one occasion  · Heavy Drinking: For women, 8 or more drinks per week  For men, 15 or more drinks per week  · Any alcohol used by pregnant women  · Any alcohol used by those under the age of 21 years    If you choose to drink, do so in moderation:  · Do not drink at all if you are under the age of 24, or if you are or may be pregnant, or have health problems that could be made worse by drinking    · For women, up to 1 drink per day  · For men, up to 2 drinks a day    No one should begin drinking or drink more frequently based on potential health benefits    Short-Term Health Risks:  · Injuries: motor vehicle crashes, falls, drownings, burns  · Violence: homicide, suicide, sexual assault, intimate partner violence  · Alcohol poisoning  · Reproductive health: risky sexual behaviors, unintended prengnacy, sexually transmitted diseases, miscarriage, stillbirth, fetal alcohol syndrome    Long-Term Health Risks:  · Chronic diseases: high blood pressure, heart disease, stroke, liver disease, digestive problems  · Cancers: breast, mouth and throat, liver, colon  · Learning and memory problems: dementia, poor school performance  · Mental health: depression, anxiety, insomnia  · Social problems: lost productivity, family problems, unemployment  · Alcohol dependence    For support and more information:  · Substance Abuse and Mental Health Services Administration  [address] [place] [address]  Web Address: https://[employer]/    · Alcoholics Anonymous        Web Address: http://www gallegos info/    https://www cdc gov/alcohol/fact-sheets/alcohol-use htm     © [address] 2018 Information is for End User's use only and may not be sold, redistributed or otherwise used for commercial purposes  All illustrations and images included in CareNotes® are the copyrighted property of [employer]  or [facility] Moises Nuno     Kegel Exercises for Women   AMBULATORY CARE:   Kegel exercises  help strengthen your pelvic muscles  Pelvic muscles hold your pelvic organs, such as your bladder and uterus, in place  Kegel exercises help prevent or control problems with urine incontinence (leakage)  Incontinence may be caused by pregnancy, childbirth, or menopause  Contact your healthcare provider if:   · You cannot feel your muscles tighten or relax  · You continue to leak urine  · You have questions or concerns about your condition or care  Use the correct muscles:  Pelvic muscles are the muscles you use to control urine flow  To target these muscles, stop and start the flow of urine several times  This will help you become familiar with how it feels to tighten and relax these muscles  How to do Kegel exercises:   · Empty your bladder  You may lie down, stand up, or sit down to do these exercises  When you first try to do these exercises, it may be easier if you lie down  Tighten or squeeze your pelvic muscles slowly  It may feel like you are trying to hold back urine or gas  Hold this position for 3 seconds  Relax for 3 seconds  Repeat this cycle 10 times  · Do 10 sets of Kegel exercises, at least 3 times a day  Do not hold your breath when you do Kegel exercises  Keep your stomach, back, and leg muscles relaxed  · As your muscles get stronger, you will be able to hold the squeeze longer   Your healthcare provider may ask that you increase your pelvic muscle squeeze to 10 seconds  After you squeeze for 10 seconds, relax for 10 seconds  What else you should know:   · Once you know how to do Kegel exercises, use different positions  You can do these exercises while you lie on the floor, sit at your desk or watch TV, and while you stand  · You may notice improved bladder control within about 6 weeks  · Tighten your pelvic muscles before you sneeze, cough, or lift to prevent urine leakage  Follow up with your doctor as directed:  Write down your questions so you remember to ask them during your visits  © Copyright [employer] 2022 Information is for End User's use only and may not be sold, redistributed or otherwise used for commercial purposes  All illustrations and images included in CareNotes® are the copyrighted property of [employer]  or Sydnie Nuno   The above information is an  only  It is not intended as medical advice for individual conditions or treatments  Talk to your doctor, nurse or pharmacist before following any medical regimen to see if it is safe and effective for you

## 2022-11-02 NOTE — ASSESSMENT & PLAN NOTE
Lab Results   Component Value Date    FSS2OTDNSKTS 2 490 05/13/2022    TSH 3 280 01/24/2019     Most recent TSH within normal limits

## 2022-11-02 NOTE — PROGRESS NOTES
Assessment and Plan:     Problem List Items Addressed This Visit        Endocrine    Subclinical hypothyroidism     Lab Results   Component Value Date    LSZ7OFFDVNGR 2 490 05/13/2022    TSH 3 280 01/24/2019     Most recent TSH within normal limits            Cardiovascular and Mediastinum    Primary hypertension     BP Readings from Last 3 Encounters:   11/02/22 138/80   10/25/22 120/80   12/20/21 138/80     Blood pressure controlled, continue lisinopril 10 mg daily            Musculoskeletal and Integument    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 ([address] )     Left leg melanoma removed in 2019   She follows with dermatology, saw this morning          Osteopenia of multiple sites     Most recent DEXA in January 2019 with T-score of -1 9 in the let hip with FRAX score of 4% for osteoporotic hip fracture  She was started on Alendronate 70 mg weekly in January 2020, plan for 5 years of treatment though 2025  Repeat DEXA ordered         Relevant Orders    DXA bone density spine hip and pelvis    Trigger middle finger of left hand     Status post surgery            Other Visit Diagnoses     Medicare annual wellness visit, subsequent    -  Primary    Female stress incontinence        Encounter for screening mammogram for breast cancer        Relevant Orders    Mammo screening bilateral w 3d & cad    Encounter for screening for other disorder        Encounter for immunization        Relevant Orders    influenza vaccine, high-dose, PF 0 7 mL (FLUZONE HIGH-DOSE) (Completed)        BMI Counseling: Body mass index is 29 29 kg/m²  The BMI is above normal  Nutrition recommendations include encouraging healthy choices of fruits and vegetables and reducing intake of saturated and trans fat  Exercise recommendations include moderate physical activity 150 minutes/week and strength training exercises  Rationale for BMI follow-up plan is due to patient being overweight or obese       Depression Screening and Follow-up Plan: Patient was screened for depression during today's encounter  They screened negative with a PHQ-2 score of 0  Urinary Incontinence Plan of Care: counseling topics discussed: practice Kegel (pelvic floor strengthening) exercises  Preventive health issues were discussed with patient, and age appropriate screening tests were ordered as noted in patient's After Visit Summary  Personalized health advice and appropriate referrals for health education or preventive services given if needed, as noted in patient's After Visit Summary       History of Present Illness:     Patient presents for a Medicare Wellness Visit    HPI   Patient Care Team:  Zane Hilliard MD as PCP - General (Family Medicine)  Kathrin Michael (Dermatology)  Niya Alcantara MD (Oncology)  Irish Sol OD (Optometry)  Aby Thorpe MD (Colon and Rectal Surgery)  Cesar Law DPM (Podiatry)  Clay Meneses MD (Orthopedic Surgery)     Review of Systems:     Review of Systems     Problem List:     Patient Active Problem List   Diagnosis   • 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 [facility])   • Primary hypertension   • Mixed hyperlipidemia   • Subclinical hypothyroidism   • Osteopenia of multiple sites   • Localized osteoarthritis of right hand   • Class 1 obesity due to excess calories with serious comorbidity and body mass index (BMI) of 30 0 to 30 9 in adult   • Trigger middle finger of left hand   • Situational insomnia   • Hip pain, right      Past Medical and Surgical History:     Past Medical History:   Diagnosis Date   • Diverticulosis    • Hyperlipidemia    • Hypertension    • Iron deficiency anemia secondary to inadequate dietary iron intake 2019   • Osteoarthritis    • Osteopenia      Past Surgical History:   Procedure Laterality Date   • INDUCED       x2   • KNEE ARTHROSCOPY      therapeutic    • MASS EXCISION Right 2020    Procedure: RING TRIGGER FINGER INJECTION;  Surgeon: Shani Garcia MD;  [facility];  Service: Orthopedics   • NEUROPLASTY / TRANSPOSITION MEDIAN NERVE AT CARPAL TUNNEL     • IN INCISE FINGER TENDON SHEATH Left 11/30/2020    Procedure: LONG TRIGGER FINGER RELEASE;  Surgeon: Judy Mcneil MD;  [facility];  Service: Orthopedics      Family History:     Family History   Problem Relation Age of Onset   • Stroke Mother         syndrome   • Hypertension Mother    • Hypothyroidism Mother    • Osteoarthritis Mother    • Stroke Father         syndrome    • Diabetes Father    • Hypertension Father    • Osteoarthritis Father    • Diabetes Brother    • Melanoma Sister    • Squamous cell carcinoma Sister    • Hypothyroidism Sister    • No Known Problems Maternal Grandmother    • No Known Problems Maternal Grandfather    • No Known Problems Paternal Grandmother    • No Known Problems Paternal Grandfather    • No Known Problems Sister    • No Known Problems Sister    • No Known Problems Sister    • Lung cancer Maternal Aunt    • No Known Problems Paternal Aunt    • No Known Problems Paternal Aunt    • No Known Problems Paternal Aunt       Social History:     Social History     Socioeconomic History   • Marital status: /Civil Union     Spouse name: None   • Number of children: None   • Years of education: None   • Highest education level: None   Occupational History   • None   Tobacco Use   • Smoking status: Former Smoker     Packs/day: 0 25     Years: 5 00     Pack years: 1 25     Types: Cigarettes   • Smokeless tobacco: Never Used   • Tobacco comment: 50 years ago   Vaping Use   • Vaping Use: Never used   Substance and Sexual Activity   • Alcohol use:  Yes     Alcohol/week: 2 0 standard drinks     Types: 2 Glasses of wine per week     Comment: social   • Drug use: No   • Sexual activity: Not Currently     Partners: Male   Other Topics Concern   • None   Social History Narrative    Caffeine use     Exercising erratically      Social Determinants of Health Financial Resource Strain: Low Risk    • Difficulty of Paying Living Expenses: Not hard at all   Food Insecurity: Not on file   Transportation Needs: No Transportation Needs   • Lack of Transportation (Medical): No   • Lack of Transportation (Non-Medical): No   Physical Activity: Not on file   Stress: Not on file   Social Connections: Not on file   Intimate Partner Violence: Not on file   Housing Stability: Not on file      Medications and Allergies:     Current Outpatient Medications   Medication Sig Dispense Refill   • alendronate (FOSAMAX) 70 mg tablet Take 1 tablet (70 mg total) by mouth every 7 days 12 tablet 3   • ascorbic acid (VITAMIN C) 250 mg tablet Take 250 mg by mouth daily       • Bacillus Coagulans-Inulin (Probiotic) 1-250 BILLION-MG CAPS      • Biotin 10 MG CAPS Take 1 capsule by mouth daily       • Calcium Carbonate-Vitamin D 600-400 MG-UNIT per tablet Take 1 tablet by mouth daily       • cholecalciferol (VITAMIN D3) 1,000 units tablet Take 1,000 Units by mouth daily     • coenzyme Q-10 100 MG capsule Take 100 mg by mouth daily       • Garlic 9037 MG CAPS      • lisinopril (ZESTRIL) 10 mg tablet Take 1 tablet (10 mg total) by mouth daily 90 tablet 3   • Magnesium Citrate 100 MG TABS Take 1 tablet by mouth daily       • Multiple Vitamin (Multivitamin Adult) TABS Take 1 tablet by mouth daily     • NON FORMULARY Take 1 capsule by mouth in the morning Dr Johnny Ruby ([facility]IL) 1000 MG CAPS Take by mouth 2 (two) times a day       • rosuvastatin (CRESTOR) 20 MG tablet Take 1 tablet (20 mg total) by mouth daily 90 tablet 3   • Turmeric 500 MG TABS Take 1 tablet by mouth daily     • zinc gluconate 50 mg tablet Take 50 mg by mouth daily       No current facility-administered medications for this visit       Allergies   Allergen Reactions   • Penicillins Rash   • Sulfamethoxazole-Trimethoprim Rash     Reaction Date: [id number];       Immunizations:     Immunization History   Administered Date(s) Administered   • COVID-19 PFIZER VACCINE 0 3 ML IM 02/15/2021, 03/07/2021   • Influenza Split High Dose Preservative Free IM 12/19/2013, 12/22/2014, 12/23/2015, 01/19/2017, 01/18/2018   • Influenza, high dose seasonal 0 7 mL 10/18/2018, 01/28/2020, 11/02/2022   • Influenza, seasonal, injectable 01/08/2013   • Pneumococcal Conjugate 13-Valent 12/23/2015   • Pneumococcal Polysaccharide PPV23 12/19/2013   • Td (adult), adsorbed 01/28/2014   • Varicella 01/01/2010   • Zoster 1945   • Zoster Vaccine Recombinant 12/04/2019, 06/18/2020      Health Maintenance:         Topic Date Due   • Breast Cancer Screening: Mammogram  01/25/2023   • Colorectal Cancer Screening  08/15/2024   • Hepatitis C Screening  Completed         Topic Date Due   • COVID-19 Vaccine (3 - Booster for Pfizer series) 08/07/2021      Medicare Screening Tests and Risk Assessments:     Yenny Mcfarland is here for her Subsequent Wellness visit  Last Medicare Wellness visit information reviewed, patient interviewed and updates made to the record today  Health Risk Assessment:   Patient rates overall health as very good  Patient feels that their physical health rating is same  Patient is satisfied with their life  Eyesight was rated as same  Hearing was rated as same  Patient feels that their emotional and mental health rating is same  Patients states they are sometimes angry  Patient states they are sometimes unusually tired/fatigued  Pain experienced in the last 7 days has been some  Patient's pain rating has been 4/10  Patient states that she has experienced no weight loss or gain in last 6 months  Depression Screening:   PHQ-2 Score: 0      Fall Risk Screening:    In the past year, patient has experienced: history of falling in past year    Number of falls: 1  Injured during fall?: No    Feels unsteady when standing or walking?: No    Worried about falling?: No      Urinary Incontinence Screening:   Patient has leaked urine accidently in the last six months  Home Safety:  Patient does not have trouble with stairs inside or outside of their home  Patient has working smoke alarms and has no working carbon monoxide detector  Home safety hazards include: none  Nutrition:   Current diet is Regular and Limited junk food  Medications:   Patient is currently taking over-the-counter supplements  OTC medications include: Probiotic, biotin, magnesium, vitamin D three, garlic, zinc, tumeric, fish oil, CoQ10, calcium, multivitamin, vitamin C, joint formula by Dr Bianca Ford  Patient is able to manage medications  Activities of Daily Living (ADLs)/Instrumental Activities of Daily Living (IADLs):   Walk and transfer into and out of bed and chair?: Yes  Dress and groom yourself?: Yes    Bathe or shower yourself?: Yes    Feed yourself? Yes  Do your laundry/housekeeping?: Yes  Manage your money, pay your bills and track your expenses?: Yes  Make your own meals?: Yes    Do your own shopping?: Yes    Previous Hospitalizations:   Any hospitalizations or ED visits within the last 12 months?: No      Advance Care Planning:   Living will: Yes    Durable POA for healthcare:  Yes    Advanced directive: Yes    Advanced directive counseling given: Yes      Cognitive Screening:   Provider or family/friend/caregiver concerned regarding cognition?: No    PREVENTIVE SCREENINGS      Cardiovascular Screening:    General: Screening Not Indicated and History Lipid Disorder      Diabetes Screening:     General: Screening Current      Colorectal Cancer Screening:     General: Screening Current      Breast Cancer Screening:     General: Screening Current      Cervical Cancer Screening:    General: Screening Not Indicated      Lung Cancer Screening:     General: Screening Not Indicated      Hepatitis C Screening:    General: Screening Current    Screening, Brief Intervention, and Referral to Treatment (SBIRT)    Screening  Typical number of drinks in a day: 1  Typical number of drinks in a week: 3  Interpretation: Low risk drinking behavior  AUDIT-C Screenin) How often did you have a drink containing alcohol in the past year? 2 to 3 times a week  2) How many drinks did you have on a typical day when you were drinking in the past year? 1 to 2  3) How often did you have 6 or more drinks on one occasion in the past year? never    AUDIT-C Score: 3  Interpretation: Score 3-12 (female): POSITIVE screen for alcohol misuse    AUDIT Screenin) How often during the last year have you found that you were not able to stop drinking once you had started? 0 - never  5) How often during the last year have you failed to do what was normally expected from you because of drinking? 0 - never  6) How often during the last year have you needed a first drink in the morning to get yourself going after a heavy drinking session? 0 - never  7) How often during the last year have you had a feeling of guilt or remorse after drinking? 0 - never  8) How often during the last year have you been unable to remember what happened the night before because you had been drinking? 0 - never  9) Have you or someone else been injured as a result of your drinking? 0 - no  10) Has a relative or friend or a doctor or another health worker been concerned about your drinking or suggested you cut down? 0 - no    AUDIT Score: 3  Interpretation: Low risk alcohol consumption    Single Item Drug Screening:  How often have you used an illegal drug (including marijuana) or a prescription medication for non-medical reasons in the past year? never    Single Item Drug Screen Score: 0  Interpretation: Negative screen for possible drug use disorder    Other Counseling Topics:   Car/seat belt/driving safety and regular weightbearing exercise and calcium and vitamin D intake       No exam data present     Physical Exam:     /80   Pulse 70   Temp (!) 96 9 °F (36 1 °C)   Resp 16   Ht 5' (1 524 m)   Wt 68 kg (150 lb)   SpO2 98%   BMI 29 29 kg/m²     Physical Exam  Constitutional:       General: She is not in acute distress  Appearance: Normal appearance  She is normal weight  She is not ill-appearing or toxic-appearing  HENT:      Head: Normocephalic and atraumatic  Right Ear: External ear normal       Left Ear: External ear normal       Nose: Nose normal       Mouth/Throat:      Mouth: Mucous membranes are moist    Eyes:      Extraocular Movements: Extraocular movements intact  Conjunctiva/sclera: Conjunctivae normal    Cardiovascular:      Rate and Rhythm: Normal rate and regular rhythm  Pulses: Normal pulses  Heart sounds: Normal heart sounds  No murmur heard  No friction rub  No gallop  Pulmonary:      Effort: Pulmonary effort is normal  No respiratory distress  Breath sounds: Normal breath sounds  No stridor  No wheezing or rhonchi  Musculoskeletal:         General: Normal range of motion  Cervical back: Normal range of motion and neck supple  Right lower leg: No edema  Left lower leg: No edema  Skin:     General: Skin is warm and dry  Findings: No erythema or rash  Neurological:      General: No focal deficit present  Mental Status: She is alert and oriented to person, place, and time     Psychiatric:         Mood and Affect: Mood normal          Behavior: Behavior normal           Kyle Tolbert MD

## 2022-11-02 NOTE — ASSESSMENT & PLAN NOTE
Most recent DEXA in January 2019 with T-score of -1 9 in the let hip with FRAX score of 4% for osteoporotic hip fracture     She was started on Alendronate 70 mg weekly in January 2020, plan for 5 years of treatment though 2025  Repeat DEXA ordered

## 2022-12-27 DIAGNOSIS — I10 ESSENTIAL HYPERTENSION: ICD-10-CM

## 2022-12-27 RX ORDER — LISINOPRIL 10 MG/1
10 TABLET ORAL DAILY
Qty: 90 TABLET | Refills: 3 | Status: SHIPPED | OUTPATIENT
Start: 2022-12-27

## 2023-04-06 ENCOUNTER — HOSPITAL ENCOUNTER (OUTPATIENT)
Dept: RADIOLOGY | Age: 78
Discharge: HOME | End: 2023-04-06

## 2023-04-06 VITALS — BODY MASS INDEX: 29.45 KG/M2 | HEIGHT: 60 IN | WEIGHT: 150 LBS

## 2023-04-06 DIAGNOSIS — M85.89 OSTEOPENIA OF MULTIPLE SITES: ICD-10-CM

## 2023-04-06 DIAGNOSIS — Z12.31 ENCOUNTER FOR SCREENING MAMMOGRAM FOR BREAST CANCER: ICD-10-CM

## 2023-08-04 DIAGNOSIS — M85.89 OSTEOPENIA OF MULTIPLE SITES: ICD-10-CM

## 2023-08-07 RX ORDER — ALENDRONATE SODIUM 70 MG/1
TABLET ORAL
Qty: 12 TABLET | Refills: 3 | OUTPATIENT
Start: 2023-08-07

## 2023-08-10 DIAGNOSIS — I10 ESSENTIAL HYPERTENSION: ICD-10-CM

## 2023-08-10 DIAGNOSIS — E78.01 FAMILIAL HYPERCHOLESTEROLEMIA: ICD-10-CM

## 2023-08-10 DIAGNOSIS — M85.89 OSTEOPENIA OF MULTIPLE SITES: ICD-10-CM

## 2023-08-10 RX ORDER — LISINOPRIL 10 MG/1
10 TABLET ORAL DAILY
Qty: 90 TABLET | Refills: 1 | Status: SHIPPED | OUTPATIENT
Start: 2023-08-10

## 2023-08-10 RX ORDER — ROSUVASTATIN CALCIUM 20 MG/1
20 TABLET, COATED ORAL DAILY
Qty: 90 TABLET | Refills: 1 | Status: SHIPPED | OUTPATIENT
Start: 2023-08-10

## 2023-08-10 RX ORDER — ALENDRONATE SODIUM 70 MG/1
70 TABLET ORAL
Qty: 12 TABLET | Refills: 1 | Status: SHIPPED | OUTPATIENT
Start: 2023-08-10

## 2023-09-19 ENCOUNTER — OFFICE VISIT (OUTPATIENT)
Dept: FAMILY MEDICINE CLINIC | Facility: CLINIC | Age: 78
End: 2023-09-19
Payer: COMMERCIAL

## 2023-09-19 VITALS
WEIGHT: 148 LBS | HEIGHT: 60 IN | TEMPERATURE: 97.5 F | SYSTOLIC BLOOD PRESSURE: 142 MMHG | BODY MASS INDEX: 29.06 KG/M2 | OXYGEN SATURATION: 96 % | HEART RATE: 71 BPM | DIASTOLIC BLOOD PRESSURE: 60 MMHG

## 2023-09-19 DIAGNOSIS — M54.16 LUMBAR RADICULOPATHY: Primary | ICD-10-CM

## 2023-09-19 DIAGNOSIS — D03.72 MELANOMA IN SITU OF LEFT LOWER EXTREMITY (HCC): ICD-10-CM

## 2023-09-19 DIAGNOSIS — M70.62 TROCHANTERIC BURSITIS OF LEFT HIP: ICD-10-CM

## 2023-09-19 PROBLEM — N18.31 STAGE 3A CHRONIC KIDNEY DISEASE (HCC): Status: ACTIVE | Noted: 2023-09-19

## 2023-09-19 PROBLEM — M25.551 HIP PAIN, RIGHT: Status: RESOLVED | Noted: 2021-12-20 | Resolved: 2023-09-19

## 2023-09-19 PROBLEM — M65.332 TRIGGER MIDDLE FINGER OF LEFT HAND: Status: RESOLVED | Noted: 2020-11-30 | Resolved: 2023-09-19

## 2023-09-19 PROCEDURE — 99213 OFFICE O/P EST LOW 20 MIN: CPT | Performed by: FAMILY MEDICINE

## 2023-09-19 RX ORDER — METHYLPREDNISOLONE 4 MG/1
TABLET ORAL
Qty: 21 EACH | Refills: 0 | Status: SHIPPED | OUTPATIENT
Start: 2023-09-19

## 2023-09-19 NOTE — PROGRESS NOTES
Name: Jackelyn Zhu      :       MRN: [id number]  Encounter Provider: Claudeen Ahmadi, MD  Encounter Date: 2023   Encounter department: [address] [place] [address]     1. Lumbar radiculopathy  -     methylPREDNISolone 4 MG tablet therapy pack; Use as directed on package    2. Trochanteric bursitis of left hip    3. 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 [facility])    Patient has a scheduled trip to [place] [facility]. She was provided a prescription for a Medrol Dosepak if she has a flareup while on vacation. Recheck as needed       Subjective     Patient presented with several days of severe pain starting left lateral hip radiating into the left leg to left foot. No LBP. No trauma or injury. No leg weakness or numbness. No new bowel or bladder changes. Pain is more or less resolved. She has been using as needed Tylenol for pain. Remote history of melanoma in situ left lower extremity-followed by Dermatology    Lab Results   Component Value Date    SODIUM 138 2022    K 4.0 2022     2022    CO2 27 2022    BUN 21 2022    CREATININE 1.02 2022    GLUC 88 2020    CALCIUM 8.9 2022               Review of Systems   Constitutional: Negative for appetite change, chills, fever and unexpected weight change. Gastrointestinal: Negative for abdominal pain, constipation, diarrhea and nausea. Genitourinary: Negative for difficulty urinating. Musculoskeletal: Positive for arthralgias. Negative for back pain. Skin: Negative for rash. Neurological: Negative for weakness and numbness.        Past Medical History:   Diagnosis Date   • Diverticulosis    • Hyperlipidemia    • Hypertension    • Iron deficiency anemia secondary to inadequate dietary iron intake 2019   • Osteoarthritis    • Osteopenia      Past Surgical History:   Procedure Laterality Date   • INDUCED       x2   • KNEE ARTHROSCOPY      therapeutic    • MASS EXCISION Right 11/30/2020    Procedure: RING TRIGGER FINGER INJECTION;  Surgeon: Namita Valverde MD;  [facility];  Service: Orthopedics   • NEUROPLASTY / TRANSPOSITION MEDIAN NERVE AT CARPAL TUNNEL     • SD TENDON SHEATH INCISION Left 11/30/2020    Procedure: LONG TRIGGER FINGER RELEASE;  Surgeon: Namita Valverde MD;  [facility];  Service: Orthopedics     Family History   Problem Relation Age of Onset   • Stroke Mother         syndrome   • Hypertension Mother    • Hypothyroidism Mother    • Osteoarthritis Mother    • Stroke Father         syndrome    • Diabetes Father    • Hypertension Father    • Osteoarthritis Father    • Diabetes Brother    • Melanoma Sister    • Squamous cell carcinoma Sister    • Hypothyroidism Sister    • No Known Problems Maternal Grandmother    • No Known Problems Maternal Grandfather    • No Known Problems Paternal Grandmother    • No Known Problems Paternal Grandfather    • No Known Problems Sister    • No Known Problems Sister    • No Known Problems Sister    • Lung cancer Maternal Aunt    • No Known Problems Paternal Aunt    • No Known Problems Paternal Aunt    • No Known Problems Paternal Aunt      Social History     Socioeconomic History   • Marital status: /Civil Union     Spouse name: None   • Number of children: None   • Years of education: None   • Highest education level: None   Occupational History   • None   Tobacco Use   • Smoking status: Former     Packs/day: 0.25     Years: 5.00     Total pack years: 1.25     Types: Cigarettes   • Smokeless tobacco: Never   • Tobacco comments:     50 years ago   Vaping Use   • Vaping Use: Never used   Substance and Sexual Activity   • Alcohol use:  Yes     Alcohol/week: 2.0 standard drinks of alcohol     Types: 2 Glasses of wine per week     Comment: social   • Drug use: No   • Sexual activity: Not Currently     Partners: Male   Other Topics Concern   • None   Social History Narrative    Caffeine use Exercising erratically      Social Determinants of Health     Financial Resource Strain: Low Risk  (10/26/2022)    Overall Financial Resource Strain (CARDIA)    • Difficulty of Paying Living Expenses: Not hard at all   Food Insecurity: Not on file   Transportation Needs: No Transportation Needs (10/26/2022)    PRAPARE - Transportation    • Lack of Transportation (Medical): No    • Lack of Transportation (Non-Medical):  No   Physical Activity: Not on file   Stress: Not on file   Social Connections: Not on file   Intimate Partner Violence: Not on file   Housing Stability: Not on file     Current Outpatient Medications on File Prior to Visit   Medication Sig   • alendronate (FOSAMAX) 70 mg tablet Take 1 tablet (70 mg total) by mouth every 7 days   • ascorbic acid (VITAMIN C) 250 mg tablet Take 250 mg by mouth daily     • Bacillus Coagulans-Inulin (Probiotic) 1-250 BILLION-MG CAPS    • Biotin 10 MG CAPS Take 1 capsule by mouth daily     • Calcium Carbonate-Vitamin D 600-400 MG-UNIT per tablet Take 1 tablet by mouth daily     • cholecalciferol (VITAMIN D3) 1,000 units tablet Take 1,000 Units by mouth daily   • coenzyme Q-10 100 MG capsule Take 100 mg by mouth daily     • Garlic 2546 MG CAPS    • lisinopril (ZESTRIL) 10 mg tablet Take 1 tablet (10 mg total) by mouth daily   • Magnesium Citrate 100 MG TABS Take 1 tablet by mouth daily     • Multiple Vitamin (Multivitamin Adult) TABS Take 1 tablet by mouth daily   • NON FORMULARY Take 1 capsule by mouth in the morning Dr. Caesar Cancino ([facility]) 1000 MG CAPS Take by mouth 2 (two) times a day     • rosuvastatin (CRESTOR) 20 MG tablet Take 1 tablet (20 mg total) by mouth daily   • Turmeric 500 MG TABS Take 1 tablet by mouth daily   • zinc gluconate 50 mg tablet Take 50 mg by mouth daily     Allergies   Allergen Reactions   • Penicillins Rash   • Sulfamethoxazole-Trimethoprim Rash     Reaction Date: [id number];      Immunization History Administered Date(s) Administered   • COVID-19 PFIZER VACCINE 0.3 ML IM 02/15/2021, 03/07/2021   • Influenza Split High Dose Preservative Free IM 12/19/2013, 12/22/2014, 12/23/2015, 01/19/2017, 01/18/2018   • Influenza, high dose seasonal 0.7 mL 10/18/2018, 01/28/2020, 11/02/2022   • Influenza, seasonal, injectable 01/08/2013   • Pneumococcal Conjugate 13-Valent 12/23/2015   • Pneumococcal Polysaccharide PPV23 12/19/2013   • Td (adult), adsorbed 01/28/2014   • Varicella 01/01/2010   • Zoster 1945   • Zoster Vaccine Recombinant 12/04/2019, 06/18/2020       Objective     /60 (BP Location: Left arm, Patient Position: Sitting, Cuff Size: Standard)   Pulse 71   Temp 97.5 °F (36.4 °C)   Ht 5' (1.524 m)   Wt 67.1 kg (148 lb)   SpO2 96%   BMI 28.90 kg/m²     Physical Exam  Constitutional:       General: She is not in acute distress. Musculoskeletal:         General: Tenderness (Mild tenderness over left trochanteric bursa area) present. Lumbar back: No spasms, tenderness or bony tenderness. Normal range of motion. Right lower leg: No edema. Left lower leg: No edema. Comments: Range of motion of hips and knees are normal.  Negative sitting root test.   Skin:     Findings: No rash. Neurological:      General: No focal deficit present. Mental Status: She is alert and oriented to person, place, and time. Sensory: No sensory deficit. Motor: No weakness.       Deep Tendon Reflexes: Reflexes normal.      Comments: Strength in lower extremities normal.  Dorsiflexion and plantarflexion normal       Kee Crisostomo MD

## 2023-11-15 ENCOUNTER — RA CDI HCC (OUTPATIENT)
Dept: OTHER | Facility: HOSPITAL | Age: 78
End: 2023-11-15

## 2023-11-15 NOTE — PROGRESS NOTES
[address] [facility] coding opportunities       Chart reviewed, no opportunity found: [address]        Patients Insurance     Medicare Insurance: [employer] Ad[employer]

## 2023-11-22 ENCOUNTER — OFFICE VISIT (OUTPATIENT)
Dept: FAMILY MEDICINE CLINIC | Facility: CLINIC | Age: 78
End: 2023-11-22
Payer: COMMERCIAL

## 2023-11-22 VITALS
OXYGEN SATURATION: 97 % | WEIGHT: 150.5 LBS | RESPIRATION RATE: 16 BRPM | DIASTOLIC BLOOD PRESSURE: 76 MMHG | TEMPERATURE: 97.8 F | HEART RATE: 55 BPM | HEIGHT: 60 IN | SYSTOLIC BLOOD PRESSURE: 116 MMHG | BODY MASS INDEX: 29.55 KG/M2

## 2023-11-22 DIAGNOSIS — I10 PRIMARY HYPERTENSION: ICD-10-CM

## 2023-11-22 DIAGNOSIS — Z23 ENCOUNTER FOR IMMUNIZATION: ICD-10-CM

## 2023-11-22 DIAGNOSIS — E78.2 MIXED HYPERLIPIDEMIA: ICD-10-CM

## 2023-11-22 DIAGNOSIS — N18.31 STAGE 3A CHRONIC KIDNEY DISEASE (HCC): ICD-10-CM

## 2023-11-22 DIAGNOSIS — Z00.00 MEDICARE ANNUAL WELLNESS VISIT, SUBSEQUENT: ICD-10-CM

## 2023-11-22 DIAGNOSIS — E03.8 SUBCLINICAL HYPOTHYROIDISM: Primary | ICD-10-CM

## 2023-11-22 DIAGNOSIS — M85.89 OSTEOPENIA OF MULTIPLE SITES: ICD-10-CM

## 2023-11-22 DIAGNOSIS — F51.09 SITUATIONAL INSOMNIA: ICD-10-CM

## 2023-11-22 DIAGNOSIS — N39.3 STRESS INCONTINENCE: ICD-10-CM

## 2023-11-22 PROCEDURE — G0439 PPPS, SUBSEQ VISIT: HCPCS | Performed by: FAMILY MEDICINE

## 2023-11-22 PROCEDURE — 99214 OFFICE O/P EST MOD 30 MIN: CPT | Performed by: FAMILY MEDICINE

## 2023-11-22 PROCEDURE — G0008 ADMIN INFLUENZA VIRUS VAC: HCPCS

## 2023-11-22 PROCEDURE — 90662 IIV NO PRSV INCREASED AG IM: CPT | Performed by: FAMILY MEDICINE

## 2023-11-22 PROCEDURE — G0442 ANNUAL ALCOHOL SCREEN 15 MIN: HCPCS | Performed by: FAMILY MEDICINE

## 2023-11-22 PROCEDURE — G0444 DEPRESSION SCREEN ANNUAL: HCPCS | Performed by: FAMILY MEDICINE

## 2023-11-22 RX ORDER — CLINDAMYCIN HYDROCHLORIDE 150 MG/1
150 CAPSULE ORAL 4 TIMES DAILY
COMMUNITY
Start: 2023-10-30

## 2023-11-22 NOTE — ASSESSMENT & PLAN NOTE
Lab Results   Component Value Date    EGFR 53 05/13/2022    EGFR 68 05/14/2021    EGFR 65 11/18/2020    CREATININE 1.02 05/13/2022    CREATININE 0.85 05/14/2021    CREATININE 0.89 11/18/2020     Stable. Continue to monitor.

## 2023-11-22 NOTE — ASSESSMENT & PLAN NOTE
BP Readings from Last 3 Encounters:   11/22/23 116/76   09/19/23 142/60   11/02/22 138/80     Blood pressure controlled, continue lisinopril 10 mg daily

## 2023-11-22 NOTE — ASSESSMENT & PLAN NOTE
Lab Results   Component Value Date    HPU3TPYVSANC 2.490 05/13/2022    TSH 3.280 01/24/2019     Repeat TSH. Patient does endorse worsening hair loss.

## 2023-11-22 NOTE — ASSESSMENT & PLAN NOTE
Stable. Reviewed dexa scan.    She was started on Alendronate 70 mg weekly in January 2020, plan for 5 years of treatment though 2025  Repeat DEXA ordered

## 2023-11-22 NOTE — PATIENT INSTRUCTIONS
Medicare Preventive Visit Patient Instructions  Thank you for completing your Welcome to Medicare Visit or Medicare Annual Wellness Visit today. Your next wellness visit will be due in one year (11/22/2024). The screening/preventive services that you may require over the next 5-10 years are detailed below. Some tests may not apply to you based off risk factors and/or age. Screening tests ordered at today's visit but not completed yet may show as past due. Also, please note that scanned in results may not display below. Preventive Screenings:  Service Recommendations Previous Testing/Comments   Colorectal Cancer Screening  * Colonoscopy    * Fecal Occult Blood Test (FOBT)/Fecal Immunochemical Test (FIT)  * Fecal DNA/Cologuard Test  * Flexible Sigmoidoscopy Age: 43-73 years old   Colonoscopy: every 10 years (may be performed more frequently if at higher risk)  OR  FOBT/FIT: every 1 year  OR  Cologuard: every 3 years  OR  Sigmoidoscopy: every 5 years  Screening may be recommended earlier than age 39 if at higher risk for colorectal cancer. Also, an individualized decision between you and your healthcare provider will decide whether screening between the ages of 77-80 would be appropriate. Colonoscopy: 08/15/2019  FOBT/FIT: Not on file  Cologuard: Not on file  Sigmoidoscopy: Not on file    Screening Current     Breast Cancer Screening Age: 36 years old  Frequency: every 1-2 years  Not required if history of left and right mastectomy Mammogram: 04/06/2023    Screening Current   Cervical Cancer Screening Between the ages of 21-29, pap smear recommended once every 3 years. Between the ages of 32-69, can perform pap smear with HPV co-testing every 5 years.    Recommendations may differ for women with a history of total hysterectomy, cervical cancer, or abnormal pap smears in past. Pap Smear: 02/19/2020    Screening Not Indicated   Hepatitis C Screening Once for adults born between 1945 and 1965  More frequently in patients at high risk for Hepatitis C Hep C Antibody: 01/24/2019    Screening Current   Diabetes Screening 1-2 times per year if you're at risk for diabetes or have pre-diabetes Fasting glucose: 97 mg/dL (5/13/2022)  A1C: No results in last 5 years (No results in last 5 years)      Cholesterol Screening Once every 5 years if you don't have a lipid disorder. May order more often based on risk factors. Lipid panel: 05/13/2022    Screening Not Indicated  History Lipid Disorder     Other Preventive Screenings Covered by Medicare:  Abdominal Aortic Aneurysm (AAA) Screening: covered once if your at risk. You're considered to be at risk if you have a family history of AAA. Lung Cancer Screening: covers low dose CT scan once per year if you meet all of the following conditions: (1) Age 48-67; (2) No signs or symptoms of lung cancer; (3) Current smoker or have quit smoking within the last 15 years; (4) You have a tobacco smoking history of at least 20 pack years (packs per day multiplied by number of years you smoked); (5) You get a written order from a healthcare provider. Glaucoma Screening: covered annually if you're considered high risk: (1) You have diabetes OR (2) Family history of glaucoma OR (3)  aged 48 and older OR (3)  American aged 72 and older  Osteoporosis Screening: covered every 2 years if you meet one of the following conditions: (1) You're estrogen deficient and at risk for osteoporosis based off medical history and other findings; (2) Have a vertebral abnormality; (3) On glucocorticoid therapy for more than 3 months; (4) Have primary hyperparathyroidism; (5) On osteoporosis medications and need to assess response to drug therapy. Last bone density test (DXA Scan): 04/06/2023. HIV Screening: covered annually if you're between the age of 14-79. Also covered annually if you are younger than 13 and older than 72 with risk factors for HIV infection.  For pregnant patients, it is covered up to 3 times per pregnancy. Immunizations:  Immunization Recommendations   Influenza Vaccine Annual influenza vaccination during flu season is recommended for all persons aged >= 6 months who do not have contraindications   Pneumococcal Vaccine   * Pneumococcal conjugate vaccine = PCV13 (Prevnar 13), PCV15 (Vaxneuvance), PCV20 (Prevnar 20)  * Pneumococcal polysaccharide vaccine = PPSV23 (Pneumovax) Adults 95-96 yo with certain risk factors or if 69+ yo  If never received any pneumonia vaccine: recommend Prevnar 20 (PCV20)  Give PCV20 if previously received 1 dose of PCV13 or PPSV23   Hepatitis B Vaccine 3 dose series if at intermediate or high risk (ex: diabetes, end stage renal disease, liver disease)   Respiratory syncytial virus (RSV) Vaccine - COVERED BY MEDICARE PART D  * RSVPreF3 (Arexvy) CDC recommends that adults 61years of age and older may receive a single dose of RSV vaccine using shared clinical decision-making (SCDM)   Tetanus (Td) Vaccine - COST NOT COVERED BY MEDICARE PART B Following completion of primary series, a booster dose should be given every 10 years to maintain immunity against tetanus. Td may also be given as tetanus wound prophylaxis. Tdap Vaccine - COST NOT COVERED BY MEDICARE PART B Recommended at least once for all adults. For pregnant patients, recommended with each pregnancy. Shingles Vaccine (Shingrix) - COST NOT COVERED BY MEDICARE PART B  2 shot series recommended in those 19 years and older who have or will have weakened immune systems or those 50 years and older     Health Maintenance Due:      Topic Date Due   • Breast Cancer Screening: Mammogram  04/06/2024   • Colorectal Cancer Screening  08/15/2024   • Hepatitis C Screening  Completed     Immunizations Due:      Topic Date Due   • Influenza Vaccine (1) 09/01/2023     Advance Directives   What are advance directives? Advance directives are legal documents that state your wishes and plans for medical care. These plans are made ahead of time in case you lose your ability to make decisions for yourself. Advance directives can apply to any medical decision, such as the treatments you want, and if you want to donate organs. What are the types of advance directives? There are many types of advance directives, and each state has rules about how to use them. You may choose a combination of any of the following:  Living will: This is a written record of the treatment you want. You can also choose which treatments you do not want, which to limit, and which to stop at a certain time. This includes surgery, medicine, IV fluid, and tube feedings. Durable power of  for healthcare [facility] [place]): This is a written record that states who you want to make healthcare choices for you when you are unable to make them for yourself. This person, called a proxy, is usually a family member or a friend. You may choose more than 1 proxy. Do not resuscitate (DNR) order:  A DNR order is used in case your heart stops beating or you stop breathing. It is a request not to have certain forms of treatment, such as CPR. A DNR order may be included in other types of advance directives. Medical directive: This covers the care that you want if you are in a coma, near death, or unable to make decisions for yourself. You can list the treatments you want for each condition. Treatment may include pain medicine, surgery, blood transfusions, dialysis, IV or tube feedings, and a ventilator (breathing machine). Values history: This document has questions about your views, beliefs, and how you feel and think about life. This information can help others choose the care that you would choose. Why are advance directives important? An advance directive helps you control your care. Although spoken wishes may be used, it is better to have your wishes written down. Spoken wishes can be misunderstood, or not followed.  Treatments may be given even if you do not want them. An advance directive may make it easier for your family to make difficult choices about your care. Urinary Incontinence   Urinary incontinence (UI)  is when you lose control of your bladder. UI develops because your bladder cannot store or empty urine properly. The 3 most common types of UI are stress incontinence, urge incontinence, or both. Medicines:   May be given to help strengthen your bladder control. Report any side effects of medication to your healthcare provider. Do pelvic muscle exercises often:  Your pelvic muscles help you stop urinating. Squeeze these muscles tight for 5 seconds, then relax for 5 seconds. Gradually work up to squeezing for 10 seconds. Do 3 sets of 15 repetitions a day, or as directed. This will help strengthen your pelvic muscles and improve bladder control. Train your bladder:  Go to the bathroom at set times, such as every 2 hours, even if you do not feel the urge to go. You can also try to hold your urine when you feel the urge to go. For example, hold your urine for 5 minutes when you feel the urge to go. As that becomes easier, hold your urine for 10 minutes. Self-care:   Keep a UI record. Write down how often you leak urine and how much you leak. Make a note of what you were doing when you leaked urine. Drink liquids as directed. You may need to limit the amount of liquid you drink to help control your urine leakage. Do not drink any liquid right before you go to bed. Limit or do not have drinks that contain caffeine or alcohol. Prevent constipation. Eat a variety of high-fiber foods. Good examples are high-fiber cereals, beans, vegetables, and whole-grain breads. Walking is the best way to trigger your intestines to have a bowel movement. Exercise regularly and maintain a healthy weight. Weight loss and exercise will decrease pressure on your bladder and help you control your leakage. Use a catheter as directed  to help empty your bladder.  A catheter is a tiny, plastic tube that is put into your bladder to drain your urine. Go to behavior therapy as directed. Behavior therapy may be used to help you learn to control your urge to urinate. Weight Management   Why it is important to manage your weight:  Being overweight increases your risk of health conditions such as heart disease, high blood pressure, type 2 diabetes, and certain types of cancer. It can also increase your risk for osteoarthritis, sleep apnea, and other respiratory problems. Aim for a slow, steady weight loss. Even a small amount of weight loss can lower your risk of health problems. How to lose weight safely:  A safe and healthy way to lose weight is to eat fewer calories and get regular exercise. You can lose up about 1 pound a week by decreasing the number of calories you eat by 500 calories each day. Healthy meal plan for weight management:  A healthy meal plan includes a variety of foods, contains fewer calories, and helps you stay healthy. A healthy meal plan includes the following:  Eat whole-grain foods more often. A healthy meal plan should contain fiber. Fiber is the part of grains, fruits, and vegetables that is not broken down by your body. Whole-grain foods are healthy and provide extra fiber in your diet. Some examples of whole-grain foods are whole-wheat breads and pastas, oatmeal, brown rice, and bulgur. Eat a variety of vegetables every day. Include dark, leafy greens such as spinach, kale, rod greens, and mustard greens. Eat yellow and orange vegetables such as carrots, sweet potatoes, and winter squash. Eat a variety of fruits every day. Choose fresh or canned fruit (canned in its own juice or light syrup) instead of juice. Fruit juice has very little or no fiber. Eat low-fat dairy foods. Drink fat-free (skim) milk or 1% milk. Eat fat-free yogurt and low-fat cottage cheese.  Try low-fat cheeses such as mozzarella and other reduced-fat cheeses. Choose meat and other protein foods that are low in fat. Choose beans or other legumes such as split peas or lentils. Choose fish, skinless poultry (chicken or turkey), or lean cuts of red meat (beef or pork). Before you cook meat or poultry, cut off any visible fat. Use less fat and oil. Try baking foods instead of frying them. Add less fat, such as margarine, sour cream, regular salad dressing and mayonnaise to foods. Eat fewer high-fat foods. Some examples of high-fat foods include french fries, doughnuts, ice cream, and cakes. Eat fewer sweets. Limit foods and drinks that are high in sugar. This includes candy, cookies, regular soda, and sweetened drinks. Exercise:  Exercise at least 30 minutes per day on most days of the week. Some examples of exercise include walking, biking, dancing, and swimming. You can also fit in more physical activity by taking the stairs instead of the elevator or parking farther away from stores. Ask your healthcare provider about the best exercise plan for you. Alcohol Use and Your Health    Drinking too much can harm your health. Excessive alcohol use leads to about 88,000 death in [facility] each year, and shortens the life of those who diet by almost 30 years. Further, excessive drinking cost the economy $249 billion in 2010. Most excessive drinkers are not alcohol dependent. Excessive alcohol use has immediate effects that increase the risk of many harmful health conditions. These are most often the result of binge drinking. Over time, excessive alcohol use can lead to the development of chronic diseases and other series health problems. What is considered a "drink"? Excessive alcohol use includes:  Binge Drinking: For women, 4 or more drinks consumed on one occasion. For men, 5 or more drinks consumed on one occasion. Heavy Drinking: For women, 8 or more drinks per week.  For men, 15 or more drinks per week  Any alcohol used by pregnant women  Any alcohol used by those under the age of 24 years    If you choose to drink, do so in moderation:  Do not drink at all if you are under the age of 24, or if you are or may be pregnant, or have health problems that could be made worse by drinking. For women, up to 1 drink per day  For men, up to 2 drinks a day    No one should begin drinking or drink more frequently based on potential health benefits    Short-Term Health Risks:  Injuries: motor vehicle crashes, falls, drownings, burns  Violence: homicide, suicide, sexual assault, intimate partner violence  Alcohol poisoning  Reproductive health: risky sexual behaviors, unintended prengnacy, sexually transmitted diseases, miscarriage, stillbirth, fetal alcohol syndrome    Long-Term Health Risks:  Chronic diseases: high blood pressure, heart disease, stroke, liver disease, digestive problems  Cancers: breast, mouth and throat, liver, colon  Learning and memory problems: dementia, poor school performance  Mental health: depression, anxiety, insomnia  Social problems: lost productivity, family problems, unemployment  Alcohol dependence    For support and more information:  Substance Abuse and [address]  Web Address: https://[employer]/    Alcoholics Anonymous        Web Address: http://www.[employer].info/    https://www.cdc.gov/alcohol/fact-sheets/alcohol-use.htm     © Copyright [employer] 2018 Information is for End User's use only and may not be sold, redistributed or otherwise used for commercial purposes.  All illustrations and images included in CareNotes® are the copyrighted property of [employer] or [address] [place] [address]

## 2023-11-22 NOTE — PROGRESS NOTES
Assessment and Plan:     Problem List Items Addressed This Visit        Endocrine    Subclinical hypothyroidism - Primary     Lab Results   Component Value Date    [id number] 2.490 05/13/2022    TSH 3.280 01/24/2019     Repeat TSH. Patient does endorse worsening hair loss. Relevant Orders    TSH, 3rd generation with Free T4 reflex       Cardiovascular and Mediastinum    Primary hypertension     BP Readings from Last 3 Encounters:   11/22/23 116/76   09/19/23 142/60   11/02/22 138/80     Blood pressure controlled, continue lisinopril 10 mg daily         Relevant Orders    CBC and Platelet    Comprehensive metabolic panel       Musculoskeletal and Integument    Osteopenia of multiple sites     Stable. Reviewed dexa scan. She was started on Alendronate 70 mg weekly in January 2020, plan for 5 years of treatment though 2025  Repeat DEXA ordered            Genitourinary    Stage 3a chronic kidney disease [facility])     Lab Results   Component Value Date    EGFR 53 05/13/2022    EGFR 68 05/14/2021    EGFR 65 11/18/2020    CREATININE 1.02 05/13/2022    CREATININE 0.85 05/14/2021    CREATININE 0.89 11/18/2020     Stable. Continue to monitor. Other    Mixed hyperlipidemia     Lab Results   Component Value Date    CHOL 164 11/12/2015    HDL 51 05/13/2022    LDLCALC 77 05/13/2022    TRIG 88 05/13/2022     Controlled, continue rosuvastatin 20 mg daily  Counseled on healthy lifestyle          Relevant Orders    Lipid panel    RESOLVED: Situational insomnia   Other Visit Diagnoses     Encounter for immunization        Relevant Orders    influenza vaccine, high-dose, PF 0.7 mL (FLUZONE HIGH-DOSE)    Stress incontinence        Medicare annual wellness visit, subsequent            AWV and follow-up completed today. Reviewed previous labs. Chronic conditions stable. Continue current medications. BMI Counseling: Body mass index is 29.39 kg/m².  The BMI is above normal. Nutrition recommendations include decreasing portion sizes, consuming healthier snacks, reducing intake of saturated and trans fat and reducing intake of cholesterol. Exercise recommendations include moderate physical activity 150 minutes/week. No pharmacotherapy was ordered. Patient referred to PCP. Rationale for BMI follow-up plan is due to patient being overweight or obese. Depression Screening and Follow-up Plan: Patient was screened for depression during today's encounter. They screened negative with a PHQ-2 score of 0. Urinary Incontinence Plan of Care: counseling topics discussed: practice Kegel (pelvic floor strengthening) exercises. Preventive health issues were discussed with patient, and age appropriate screening tests were ordered as noted in patient's After Visit Summary. Personalized health advice and appropriate referrals for health education or preventive services given if needed, as noted in patient's After Visit Summary. History of Present Illness:     Patient presents for a Medicare Wellness Visit    Patient presents with:  Medicare Wellness Visit: Lost of hair hair dress things its a thyroid issue, Everyone in her family is on thyroid medication but her. Overall doing well. Stable on medications. Following with audiology for hearing loss. Patient Care Team:  Ramses Troncoso MD as PCP - General (Family Medicine)  Candida Gosselin (Dermatology)  Priya De Leon MD (Oncology)  Corrina Odell OD (Optometry)  Delores Esposito MD (Colon and Rectal Surgery)  Florencia Yarbrough DPM (Podiatry)  Vu Cleary MD (Orthopedic Surgery)     Review of Systems:     Review of Systems   Constitutional:  Positive for fatigue. Negative for fever. HENT:  Positive for hearing loss (wears hearing aids). Negative for sore throat. Hair loss      Eyes:  Negative for visual disturbance. Respiratory:  Negative for cough, chest tightness and shortness of breath.     Cardiovascular: Negative for chest pain, palpitations and leg swelling. Gastrointestinal:  Negative for abdominal pain, constipation, diarrhea and nausea. Endocrine: Negative for cold intolerance and heat intolerance. Genitourinary:  Negative for flank pain. Musculoskeletal:  Negative for back pain and neck pain. Skin:  Negative for rash. Neurological:  Negative for headaches. Psychiatric/Behavioral:  Negative for behavioral problems and confusion.          Problem List:     Patient Active Problem List   Diagnosis   • 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 ([address])   • Primary hypertension   • Mixed hyperlipidemia   • Subclinical hypothyroidism   • Osteopenia of multiple sites   • Localized osteoarthritis of right hand   • Class 1 obesity due to excess calories with serious comorbidity and body mass index (BMI) of 30.0 to 30.9 in adult   • Stage 3a chronic kidney disease ([address])      Past Medical and Surgical History:     Past Medical History:   Diagnosis Date   • Allergic     This was for penicillin   • Arthritis    • Diverticulitis of colon    • Diverticulosis    • HL (hearing loss)    • Hyperlipidemia    • Hypertension    • Iron deficiency anemia secondary to inadequate dietary iron intake 2019   • Osteoarthritis    • Osteopenia    • Situational insomnia 2021   • Urinary tract infection      Past Surgical History:   Procedure Laterality Date   • INDUCED       x2   • KNEE ARTHROSCOPY      therapeutic    • MASS EXCISION Right 2020    Procedure: RING TRIGGER FINGER INJECTION;  Surgeon: Pauline Franco MD;  [facility]: [facility];  Service: Orthopedics   • NEUROPLASTY / TRANSPOSITION MEDIAN NERVE AT CARPAL TUNNEL     • MS TENDON SHEATH INCISION Left 2020    Procedure: LONG TRIGGER FINGER RELEASE;  Surgeon: Pauline Franco MD;  Location[facility];  Service: Orthopedics      Family History:     Family History   Problem Relation Age of Onset   • Stroke Mother syndrome   • Hypertension Mother    • Hypothyroidism Mother    • Osteoarthritis Mother    • Arthritis Mother    • Stroke Father         syndrome    • Diabetes Father    • Hypertension Father    • Osteoarthritis Father    • Dementia Father    • Hearing loss Father    • Diabetes Brother    • Melanoma Sister    • Squamous cell carcinoma Sister    • Hypothyroidism Sister    • Coronary artery disease Sister    • COPD Sister    • Hearing loss Sister    • No Known Problems Maternal Grandmother    • No Known Problems Maternal Grandfather    • No Known Problems Paternal Grandmother    • No Known Problems Paternal Grandfather    • Arthritis Sister    • Hearing loss Sister    • Hearing loss Sister    • Glaucoma Sister    • No Known Problems Sister    • Lung cancer Maternal Aunt    • No Known Problems Paternal Aunt    • No Known Problems Paternal Aunt    • No Known Problems Paternal Aunt    • Alcohol abuse Brother    • Substance Abuse Brother    • Diabetes Brother    • ADD / ADHD Brother    • Bipolar disorder Brother    • Drug abuse Brother       Social History:     Social History     Socioeconomic History   • Marital status: /Civil Union     Spouse name: None   • Number of children: None   • Years of education: None   • Highest education level: None   Occupational History   • None   Tobacco Use   • Smoking status: Former     Packs/day: 0.25     Years: 5.00     Total pack years: 1.25     Types: Cigarettes   • Smokeless tobacco: Never   • Tobacco comments:     50 years ago   Vaping Use   • Vaping Use: Never used   Substance and Sexual Activity   • Alcohol use:  Yes     Alcohol/week: 2.0 standard drinks of alcohol     Types: 2 Glasses of wine per week     Comment: social   • Drug use: No   • Sexual activity: Yes     Partners: Male     Birth control/protection: None   Other Topics Concern   • None   Social History Narrative    Caffeine use     Exercising erratically      Social Determinants of Health     Financial Resource Strain: Low Risk  (11/19/2023)    Overall Financial Resource Strain (CARDIA)    • Difficulty of Paying Living Expenses: Not hard at all   Food Insecurity: Not on file   Transportation Needs: No Transportation Needs (11/19/2023)    PRAPARE - Transportation    • Lack of Transportation (Medical): No    • Lack of Transportation (Non-Medical): No   Physical Activity: Not on file   Stress: Not on file   Social Connections: Not on file   Intimate Partner Violence: Not on file   Housing Stability: Not on file      Medications and Allergies:     Current Outpatient Medications   Medication Sig Dispense Refill   • alendronate (FOSAMAX) 70 mg tablet Take 1 tablet (70 mg total) by mouth every 7 days 12 tablet 1   • ascorbic acid (VITAMIN C) 250 mg tablet Take 250 mg by mouth daily       • Biotin 10 MG CAPS Take 1 capsule by mouth daily       • Calcium Carbonate-Vitamin D 600-400 MG-UNIT per tablet Take 1 tablet by mouth daily       • cholecalciferol (VITAMIN D3) 1,000 units tablet Take 1,000 Units by mouth daily     • clindamycin (CLEOCIN) 150 mg capsule Take 150 mg by mouth 4 (four) times a day     • coenzyme Q-10 100 MG capsule Take 100 mg by mouth daily       • Garlic 6984 MG CAPS      • lisinopril (ZESTRIL) 10 mg tablet Take 1 tablet (10 mg total) by mouth daily 90 tablet 1   • Magnesium Citrate 100 MG TABS Take 1 tablet by mouth daily       • Multiple Vitamin (Multivitamin Adult) TABS Take 1 tablet by mouth daily     • NON FORMULARY Take 1 capsule by mouth in the morning Dr. Ratna Monge ([facility] FISH OIL) 1000 MG CAPS Take by mouth 2 (two) times a day       • rosuvastatin (CRESTOR) 20 MG tablet Take 1 tablet (20 mg total) by mouth daily 90 tablet 1   • Turmeric 500 MG TABS Take 1 tablet by mouth daily     • zinc gluconate 50 mg tablet Take 50 mg by mouth daily       No current facility-administered medications for this visit.      Allergies   Allergen Reactions   • Penicillins Rash   • Sulfamethoxazole-Trimethoprim Rash     Reaction Date: [id number];       Immunizations:     Immunization History   Administered Date(s) Administered   • COVID-19 PFIZER VACCINE 0.3 ML IM 02/15/2021, 03/07/2021   • INFLUENZA 11/02/2022   • Influenza Split High Dose Preservative Free IM 12/19/2013, 12/22/2014, 12/23/2015, 01/19/2017, 01/18/2018   • Influenza, high dose seasonal 0.7 mL 10/18/2018, 01/28/2020, 11/02/2022   • Influenza, seasonal, injectable 01/08/2013   • Pneumococcal Conjugate 13-Valent 12/23/2015   • Pneumococcal Polysaccharide PPV23 12/19/2013   • Td (adult), adsorbed 01/28/2014   • Varicella 01/01/2010   • Zoster 1945   • Zoster Vaccine Recombinant 12/04/2019, 06/18/2020      Health Maintenance:         Topic Date Due   • Breast Cancer Screening: Mammogram  04/06/2024   • Colorectal Cancer Screening  08/15/2024   • Hepatitis C Screening  Completed         Topic Date Due   • Influenza Vaccine (1) 09/01/2023      Medicare Screening Tests and Risk Assessments:     Wilhemena Kayser is here for her Subsequent Wellness visit. Last Medicare Wellness visit information reviewed, patient interviewed, no change since last AWV. Health Risk Assessment:   Patient rates overall health as good. Patient feels that their physical health rating is same. Patient is very satisfied with their life. Eyesight was rated as same. Hearing was rated as slightly worse. Patient feels that their emotional and mental health rating is same. Patients states they are sometimes angry. Patient states they are sometimes unusually tired/fatigued. Pain experienced in the last 7 days has been none. Patient states that she has experienced no weight loss or gain in last 6 months. Depression Screening:   PHQ-2 Score: 0      Fall Risk Screening: In the past year, patient has experienced: no history of falling in past year      Urinary Incontinence Screening:   Patient has leaked urine accidently in the last six months.      Home Safety:  Patient does not have trouble with stairs inside or outside of their home. Patient has working smoke alarms and has no working carbon monoxide detector. Home safety hazards include: none. Nutrition:   Current diet is Regular. Medications:   Patient is currently taking over-the-counter supplements. OTC medications include: see medication list. Patient is able to manage medications. Activities of Daily Living (ADLs)/Instrumental Activities of Daily Living (IADLs):   Walk and transfer into and out of bed and chair?: Yes  Dress and groom yourself?: Yes    Bathe or shower yourself?: Yes    Feed yourself? Yes  Do your laundry/housekeeping?: Yes  Manage your money, pay your bills and track your expenses?: Yes  Make your own meals?: Yes    Do your own shopping?: Yes    Previous Hospitalizations:   Any hospitalizations or ED visits within the last 12 months?: No      Advance Care Planning:   Living will: Yes    Durable POA for healthcare: Yes    Advanced directive: Yes      PREVENTIVE SCREENINGS      Cardiovascular Screening:    General: Screening Not Indicated and History Lipid Disorder      Colorectal Cancer Screening:     General: Screening Current      Breast Cancer Screening:     General: Screening Current      Cervical Cancer Screening:    General: Screening Not Indicated      Osteoporosis Screening:    General: Screening Current      Lung Cancer Screening:     General: Screening Not Indicated      Hepatitis C Screening:    General: Screening Current    Screening, Brief Intervention, and Referral to Treatment (SBIRT)    Screening  Typical number of drinks in a day: 0  Typical number of drinks in a week: 2  Interpretation: Low risk drinking behavior. AUDIT-C Screenin) How often did you have a drink containing alcohol in the past year? 2 to 3 times a week  2) How many drinks did you have on a typical day when you were drinking in the past year?  1 to 2  3) How often did you have 6 or more drinks on one occasion in the past year? never    AUDIT-C Score: 3  Interpretation: Score 3-12 (female): POSITIVE screen for alcohol misuse    AUDIT Screenin) How often during the last year have you found that you were not able to stop drinking once you had started? 0 - never  5) How often during the last year have you failed to do what was normally expected from you because of drinking? 0 - never  6) How often during the last year have you needed a first drink in the morning to get yourself going after a heavy drinking session? 0 - never  7) How often during the last year have you had a feeling of guilt or remorse after drinking? 0 - never  8) How often during the last year have you been unable to remember what happened the night before because you had been drinking? 0 - never  9) Have you or someone else been injured as a result of your drinking? 0 - no  10) Has a relative or friend or a doctor or another health worker been concerned about your drinking or suggested you cut down? 0 - no    AUDIT Score: 3  Interpretation: Low risk alcohol consumption    Single Item Drug Screening:  How often have you used an illegal drug (including marijuana) or a prescription medication for non-medical reasons in the past year? never    Single Item Drug Screen Score: 0  Interpretation: Negative screen for possible drug use disorder    Brief Intervention  Alcohol & drug use screenings were reviewed. No concerns regarding substance use disorder identified. Healthy alcohol use/limits discussed. Time Spent  Time spent screening/evaluating the patient for alcohol misuse: 5 minutes. Annual Depression Screening  Time spent screening and evaluating the patient for depression during today's encounter was 5 minutes. Other Counseling Topics:   Car/seat belt/driving safety, skin self-exam, sunscreen and calcium and vitamin D intake and regular weightbearing exercise. No results found.      Physical Exam:     /76 (BP Location: Left arm, Patient Position: Sitting, Cuff Size: Standard)   Pulse 55   Temp 97.8 °F (36.6 °C)   Resp 16   Ht 5' (1.524 m)   Wt 68.3 kg (150 lb 8 oz)   SpO2 97%   BMI 29.39 kg/m²     Physical Exam  Vitals and nursing note reviewed. Constitutional:       Appearance: Normal appearance. She is well-developed. HENT:      Head: Normocephalic and atraumatic. Eyes:      Extraocular Movements: Extraocular movements intact. Pupils: Pupils are equal, round, and reactive to light. Cardiovascular:      Rate and Rhythm: Normal rate and regular rhythm. Pulmonary:      Effort: Pulmonary effort is normal.      Breath sounds: Normal breath sounds. Abdominal:      General: Bowel sounds are normal.      Palpations: Abdomen is soft. Musculoskeletal:      Cervical back: Normal range of motion. Skin:     General: Skin is warm and dry. Neurological:      General: No focal deficit present. Mental Status: She is alert and oriented to person, place, and time.    Psychiatric:         Mood and Affect: Mood normal.         Speech: Speech normal.         Behavior: Behavior normal.          Lise Cook MD

## 2023-11-28 ENCOUNTER — APPOINTMENT (OUTPATIENT)
Dept: LAB | Facility: CLINIC | Age: 78
End: 2023-11-28
Payer: COMMERCIAL

## 2023-11-28 DIAGNOSIS — I10 PRIMARY HYPERTENSION: ICD-10-CM

## 2023-11-28 DIAGNOSIS — E03.8 SUBCLINICAL HYPOTHYROIDISM: ICD-10-CM

## 2023-11-28 DIAGNOSIS — E78.2 MIXED HYPERLIPIDEMIA: ICD-10-CM

## 2023-11-28 LAB
ALBUMIN SERPL BCP-MCNC: 4.1 G/DL (ref 3.5–5)
ALP SERPL-CCNC: 80 U/L (ref 34–104)
ALT SERPL W P-5'-P-CCNC: 19 U/L (ref 7–52)
ANION GAP SERPL CALCULATED.3IONS-SCNC: 7 MMOL/L
AST SERPL W P-5'-P-CCNC: 22 U/L (ref 13–39)
BILIRUB SERPL-MCNC: 0.51 MG/DL (ref 0.2–1)
BUN SERPL-MCNC: 22 MG/DL (ref 5–25)
CALCIUM SERPL-MCNC: 9.1 MG/DL (ref 8.4–10.2)
CHLORIDE SERPL-SCNC: 108 MMOL/L (ref 96–108)
CHOLEST SERPL-MCNC: 165 MG/DL
CO2 SERPL-SCNC: 27 MMOL/L (ref 21–32)
CREAT SERPL-MCNC: 0.83 MG/DL (ref 0.6–1.3)
ERYTHROCYTE [DISTWIDTH] IN BLOOD BY AUTOMATED COUNT: 12.9 % (ref 11.6–15.1)
GFR SERPL CREATININE-BSD FRML MDRD: 68 ML/MIN/1.73SQ M
GLUCOSE P FAST SERPL-MCNC: 91 MG/DL (ref 65–99)
HCT VFR BLD AUTO: 44.4 % (ref 34.8–46.1)
HDLC SERPL-MCNC: 57 MG/DL
HGB BLD-MCNC: 14.8 G/DL (ref 11.5–15.4)
LDLC SERPL CALC-MCNC: 76 MG/DL (ref 0–100)
MCH RBC QN AUTO: 30.8 PG (ref 26.8–34.3)
MCHC RBC AUTO-ENTMCNC: 33.3 G/DL (ref 31.4–37.4)
MCV RBC AUTO: 93 FL (ref 82–98)
NONHDLC SERPL-MCNC: 108 MG/DL
PLATELET # BLD AUTO: 245 THOUSANDS/UL (ref 149–390)
PMV BLD AUTO: 10 FL (ref 8.9–12.7)
POTASSIUM SERPL-SCNC: 4.1 MMOL/L (ref 3.5–5.3)
PROT SERPL-MCNC: 7.3 G/DL (ref 6.4–8.4)
RBC # BLD AUTO: 4.8 MILLION/UL (ref 3.81–5.12)
SODIUM SERPL-SCNC: 142 MMOL/L (ref 135–147)
TRIGL SERPL-MCNC: 162 MG/DL
TSH SERPL DL<=0.05 MIU/L-ACNC: 3.6 UIU/ML (ref 0.45–4.5)
WBC # BLD AUTO: 6.6 THOUSAND/UL (ref 4.31–10.16)

## 2023-11-28 PROCEDURE — 85027 COMPLETE CBC AUTOMATED: CPT

## 2023-11-28 PROCEDURE — 36415 COLL VENOUS BLD VENIPUNCTURE: CPT

## 2023-11-28 PROCEDURE — 80061 LIPID PANEL: CPT

## 2023-11-28 PROCEDURE — 80053 COMPREHEN METABOLIC PANEL: CPT

## 2023-11-28 PROCEDURE — 84443 ASSAY THYROID STIM HORMONE: CPT

## 2023-12-25 DIAGNOSIS — E78.01 FAMILIAL HYPERCHOLESTEROLEMIA: ICD-10-CM

## 2023-12-27 RX ORDER — ROSUVASTATIN CALCIUM 20 MG/1
20 TABLET, COATED ORAL DAILY
Qty: 90 TABLET | Refills: 1 | Status: SHIPPED | OUTPATIENT
Start: 2023-12-27

## 2024-01-13 DIAGNOSIS — M85.89 OSTEOPENIA OF MULTIPLE SITES: ICD-10-CM

## 2024-01-15 RX ORDER — ALENDRONATE SODIUM 70 MG/1
TABLET ORAL
Qty: 12 TABLET | Refills: 0 | Status: SHIPPED | OUTPATIENT
Start: 2024-01-15

## 2024-01-19 DIAGNOSIS — I10 ESSENTIAL HYPERTENSION: ICD-10-CM

## 2024-01-22 RX ORDER — LISINOPRIL 10 MG/1
10 TABLET ORAL DAILY
Qty: 90 TABLET | Refills: 1 | Status: SHIPPED | OUTPATIENT
Start: 2024-01-22

## 2024-03-21 DIAGNOSIS — M85.89 OSTEOPENIA OF MULTIPLE SITES: ICD-10-CM

## 2024-03-22 RX ORDER — ALENDRONATE SODIUM 70 MG/1
TABLET ORAL
Qty: 12 TABLET | Refills: 1 | Status: SHIPPED | OUTPATIENT
Start: 2024-03-22

## 2024-04-05 ENCOUNTER — TELEPHONE (OUTPATIENT)
Age: 79
End: 2024-04-05

## 2024-04-05 NOTE — TELEPHONE ENCOUNTER
Patient is requesting a call back from the office regarding a bill she received from [facility] for her 11/22/23 wellness visit.  She states the billing department explained to her that due to certain questions that was asked at the visit caused the visit not to be covered and billed her.    Please call patient back to discuss further

## 2024-04-12 ENCOUNTER — APPOINTMENT (OUTPATIENT)
Dept: RADIOLOGY | Age: 79
End: 2024-04-12
Payer: COMMERCIAL

## 2024-04-12 ENCOUNTER — HOSPITAL ENCOUNTER (OUTPATIENT)
Dept: RADIOLOGY | Age: 79
Discharge: HOME | End: 2024-04-12
Payer: COMMERCIAL

## 2024-04-12 VITALS — WEIGHT: 151 LBS | BODY MASS INDEX: 30.44 KG/M2 | HEIGHT: 59 IN

## 2024-04-12 DIAGNOSIS — Z12.31 ENCOUNTER FOR SCREENING MAMMOGRAM FOR MALIGNANT NEOPLASM OF BREAST: ICD-10-CM

## 2024-04-12 PROCEDURE — 77067 SCR MAMMO BI INCL CAD: CPT

## 2024-04-12 PROCEDURE — 77063 BREAST TOMOSYNTHESIS BI: CPT

## 2024-05-29 DIAGNOSIS — E78.01 FAMILIAL HYPERCHOLESTEROLEMIA: ICD-10-CM

## 2024-05-30 RX ORDER — ROSUVASTATIN CALCIUM 20 MG/1
20 TABLET, COATED ORAL DAILY
Qty: 90 TABLET | Refills: 1 | Status: SHIPPED | OUTPATIENT
Start: 2024-05-30

## 2024-06-19 ENCOUNTER — TELEPHONE (OUTPATIENT)
Age: 79
End: 2024-06-19

## 2024-06-19 NOTE — TELEPHONE ENCOUNTER
VM for patient to call to schedule recall colonoscopy.    8/15/19 [facility] severe diverticulosis in sig colon 5 yr recall

## 2024-06-20 DIAGNOSIS — I10 ESSENTIAL HYPERTENSION: ICD-10-CM

## 2024-06-21 RX ORDER — LISINOPRIL 10 MG/1
10 TABLET ORAL DAILY
Qty: 90 TABLET | Refills: 0 | Status: SHIPPED | OUTPATIENT
Start: 2024-06-21

## 2024-06-21 NOTE — TELEPHONE ENCOUNTER
Return in about 6 months (around 5/22/2024) for 6m    Patient needs an appointment. Please contact the patient to schedule an appointment. [employer]D [employer] refill provided.

## 2024-08-10 DIAGNOSIS — M85.89 OSTEOPENIA OF MULTIPLE SITES: ICD-10-CM

## 2024-08-11 RX ORDER — ALENDRONATE SODIUM 70 MG/1
TABLET ORAL
Qty: 12 TABLET | Refills: 0 | Status: SHIPPED | OUTPATIENT
Start: 2024-08-11

## 2024-08-12 NOTE — TELEPHONE ENCOUNTER
Called patient, reports she only comes in yearly unless having issues. She received a call while trying to schedule and had to head to work. She will call to make an appointment.

## 2024-08-14 ENCOUNTER — TELEPHONE (OUTPATIENT)
Age: 79
End: 2024-08-14

## 2024-08-14 NOTE — TELEPHONE ENCOUNTER
Patient called scheduled her medicare annual wellness, she wanted to see if her annual routine lab work would be ordered before her visit. Please look into and advise.

## 2024-08-19 DIAGNOSIS — E78.2 MIXED HYPERLIPIDEMIA: ICD-10-CM

## 2024-08-19 DIAGNOSIS — E03.8 SUBCLINICAL HYPOTHYROIDISM: Primary | ICD-10-CM

## 2024-08-19 DIAGNOSIS — I10 PRIMARY HYPERTENSION: ICD-10-CM

## 2024-08-20 NOTE — TELEPHONE ENCOUNTER
Called patient no response left message on voicemail letting her know labs were ordered and we will be mailing out questionnaire for patient to complete before appointment

## 2024-08-27 DIAGNOSIS — I10 ESSENTIAL HYPERTENSION: ICD-10-CM

## 2024-08-29 RX ORDER — LISINOPRIL 10 MG/1
10 TABLET ORAL DAILY
Qty: 90 TABLET | Refills: 0 | Status: SHIPPED | OUTPATIENT
Start: 2024-08-29

## 2024-09-30 NOTE — PROGRESS NOTES
Colon and Rectal Surgery   Dannielle Noel 79 y.o. female MRN: [id number]   Encounter: [id number]  10/01/24   9:24 AM    Ambulatory Visit  Name: Dannielle Noel      : 1945      MRN: [id number]  Encounter Provider: Alexander Beverly MD  Encounter Date: 10/1/2024   Encounter department: [facility] COLON AND RECTAL SURGERY [place]    Assessment & Plan  Encounter for screening colonoscopy  Dannielle is a 79-year-old female, in good performance status and health.  She had 2018 colonoscopy area, negative biopsies, random.  She is having normal formed bowel movements at this time.  We reviewed a 2019 colonoscopy with diverticulitis at the sigmoid position, which was treated.    We discussed repeat colonoscopy at this point as it has been 5 years and was the prior recommendation, for screening.  Abdominal exam is benign today, without any alarm signs and she continues to have normal formed bowel movements, maintains weight, no rectal bleeding.    Colonoscopy was scheduled on checkout today             HPI  Dannielle Noel is a 79 y.o. female who presents for a colonoscopy consultation.    Last colonoscopy was performed on 08/15/2019 by Dr. Elva Beverly with a 5 year recall.  This procedure noted purulent discharge and diverticulitis in the sigmoid colon, severe diverticulosis of the sigmoid colon.   Diagnosis:  1. TERMINAL ILEUM, BIOPSY:  - Normal villous architecture with no significant histologic abnormality.  - Negative for changes of chronicity, active inflammation, granulomas or dysplasia.  2. CECUM, BIOPSY:  - Colonic mucosa with no specific histologic abnormality.  - Negative for active or chronic colitis, lymphocytic or collagenous colitis.   3. TRANSVERSE COLON, BIOPSY:  - Colonic mucosa with no specific histologic abnormality.  - Negative for active or chronic colitis, lymphocytic or collagenous colitis.   4. SIGMOID COLON, BIOPSY:  - Colonic mucosa with no specific histologic abnormality.  - Negative  for active or chronic colitis, lymphocytic or collagenous colitis.   5. RECTUM, BIOPSY:  - Colonic mucosa with no specific histologic abnormality.  - Negative for active or chronic colitis, lymphocytic or collagenous colitis.     She reports that she is having no problems at this time.  She states that her bowel movements are regular. She experiences occasional constipation but attributes this to certain foods like pasta. She denies rectal bleeding and blood in the stool.     She denies a personal and family history of colon cancer.     Historical Information   Past Medical History:   Diagnosis Date    Allergic     This was for penicillin    Arthritis     Diverticulitis of colon     Diverticulosis     HL (hearing loss)     Hyperlipidemia     Hypertension     Iron deficiency anemia secondary to inadequate dietary iron intake 2019    Osteoarthritis     Osteopenia     Situational insomnia 2021    Urinary tract infection      Past Surgical History:   Procedure Laterality Date    INDUCED       x2    KNEE ARTHROSCOPY      therapeutic     MASS EXCISION Right 2020    Procedure: RING TRIGGER FINGER INJECTION;  Surgeon: Dian Velazquez MD;  Location: [facility];  Service: Orthopedics    NEUROPLASTY / TRANSPOSITION MEDIAN NERVE AT CARPAL TUNNEL      WY TENDON SHEATH INCISION Left 2020    Procedure: LONG TRIGGER FINGER RELEASE;  Surgeon: Dian Velazquez MD;  Location: [facility];  Service: Orthopedics       Meds/Allergies       Current Outpatient Medications:     alendronate (FOSAMAX) 70 mg tablet, TAKE 1 TABLET BY MOUTH WEEKLY  WITH 8 OZ OF PLAIN WATER 30  MINUTES BEFORE FIRST FOOD, DRINK OR MEDS. STAY UPRIGHT FOR 30  MINS, Disp: 12 tablet, Rfl: 0    ascorbic acid (VITAMIN C) 250 mg tablet, Take 250 mg by mouth daily  , Disp: , Rfl:     Biotin 10 MG CAPS, Take 1 capsule by mouth daily  , Disp: , Rfl:     Calcium Carbonate-Vitamin D 600-400 MG-UNIT per tablet, Take 1  tablet by mouth daily  , Disp: , Rfl:     cholecalciferol (VITAMIN D3) 1,000 units tablet, Take 1,000 Units by mouth daily, Disp: , Rfl:     Garlic 1000 MG CAPS, , Disp: , Rfl:     lisinopril (ZESTRIL) 10 mg tablet, TAKE 1 TABLET BY MOUTH DAILY, Disp: 90 tablet, Rfl: 0    Magnesium Citrate 100 MG TABS, Take 1 tablet by mouth daily  , Disp: , Rfl:     Multiple Vitamin (Multivitamin Adult) TABS, Take 1 tablet by mouth daily, Disp: , Rfl:     NON FORMULARY, Take 1 capsule by mouth in the morning Dr. Mercola's Joint Formula, Disp: , Rfl:     Omega-3 Fatty Acids ([employer] FISH OIL) 1000 MG CAPS, Take by mouth 2 (two) times a day  , Disp: , Rfl:     rosuvastatin (CRESTOR) 20 MG tablet, TAKE 1 TABLET BY MOUTH DAILY, Disp: 90 tablet, Rfl: 1    Turmeric 500 MG TABS, Take 1 tablet by mouth daily, Disp: , Rfl:     zinc gluconate 50 mg tablet, Take 50 mg by mouth daily, Disp: , Rfl:     clindamycin (CLEOCIN) 150 mg capsule, Take 150 mg by mouth 4 (four) times a day, Disp: , Rfl:     coenzyme Q-10 100 MG capsule, Take 100 mg by mouth daily  , Disp: , Rfl:       Allergies   Allergen Reactions    Penicillins Rash    Sulfamethoxazole-Trimethoprim Rash     Reaction Date: 11May2011;          Social History   Social History     Substance and Sexual Activity   Alcohol Use Yes    Alcohol/week: 2.0 standard drinks of alcohol    Types: 2 Glasses of wine per week    Comment: social     Social History     Substance and Sexual Activity   Drug Use No     Social History     Tobacco Use   Smoking Status Former    Current packs/day: 0.25    Average packs/day: 0.3 packs/day for 5.0 years (1.3 ttl pk-yrs)    Types: Cigarettes   Smokeless Tobacco Never   Tobacco Comments    50 years ago         Family History:   Family History   Problem Relation Age of Onset    Stroke Mother         syndrome    Hypertension Mother     Hypothyroidism Mother     Osteoarthritis Mother     Arthritis Mother     Stroke Father         syndrome     Diabetes Father      "Hypertension Father     Osteoarthritis Father     Dementia Father     Hearing loss Father     Diabetes Brother     Melanoma Sister     Squamous cell carcinoma Sister     Hypothyroidism Sister     Coronary artery disease Sister     COPD Sister     Hearing loss Sister     No Known Problems Maternal Grandmother     No Known Problems Maternal Grandfather     No Known Problems Paternal Grandmother     No Known Problems Paternal Grandfather     Arthritis Sister     Hearing loss Sister     Hearing loss Sister     Glaucoma Sister     No Known Problems Sister     Lung cancer Maternal Aunt     No Known Problems Paternal Aunt     No Known Problems Paternal Aunt     No Known Problems Paternal Aunt     Alcohol abuse Brother     Substance Abuse Brother     Diabetes Brother     ADD / ADHD Brother     Bipolar disorder Brother     Drug abuse Brother        Objective     Current Vitals:   Vitals:    10/01/24 0859   BP: 122/74   Weight: 70.3 kg (155 lb)   Height: 4' 11\" (1.499 m)         Physical Exam:  General:no distress  Eyes:perrla/eomi  ENT:moist mucus membranes  Neck:supple  Pulm:no increased work of breathing, clear bilateral  CV:sinus  Abdomen:soft,nontender  Extremities:no edema        "

## 2024-10-01 ENCOUNTER — TELEPHONE (OUTPATIENT)
Age: 79
End: 2024-10-01

## 2024-10-01 ENCOUNTER — OFFICE VISIT (OUTPATIENT)
Age: 79
End: 2024-10-01
Payer: COMMERCIAL

## 2024-10-01 VITALS
HEIGHT: 59 IN | SYSTOLIC BLOOD PRESSURE: 122 MMHG | BODY MASS INDEX: 31.25 KG/M2 | DIASTOLIC BLOOD PRESSURE: 74 MMHG | WEIGHT: 155 LBS

## 2024-10-01 DIAGNOSIS — Z12.11 ENCOUNTER FOR SCREENING COLONOSCOPY: Primary | ICD-10-CM

## 2024-10-01 PROCEDURE — 99203 OFFICE O/P NEW LOW 30 MIN: CPT | Performed by: COLON & RECTAL SURGERY

## 2024-10-01 NOTE — TELEPHONE ENCOUNTER
10/1/24 OV colon consult. 8/15/19 CE 5 yr recall    2/28/25 AND SP DE instructions given to patient

## 2024-10-01 NOTE — LETTER
Dannielle Noel  [address] [place] [address] Dr Loraine CANALES [id number]      Location:  [address], [place] [address]    Performing Physician: Alexander Fong MD      DATE OF PROCEDURE: February 28, 2025 TIME OF PROCEDURE:          TBD                       The OR/GI Lab will contact you the evening prior to your procedure with your exact arrival time.    We kindly ask that you immediately notify us of any need to cancel or reschedule your procedure.      WEEK BEFORE THE PROCEDURE:  Do not take Iron tablets for one week  5 days prior to the appointment AVOID vegetables and fruits with skins or seeds, nuts, corn, popcorn, and whole grain breads.  Purchase: One (1) 238-gram container of Miralax (polyethylene glycol 3350), four (4) 5 mg Dulcolax (bisacodyl) tablets, and 64-ounces of Gatorade (sports drink) - no red, orange, or purple. These may be purchased at any pharmacy without a prescription. Generic products are permissible.  Arrange responsible transportation for day of the procedure.      DAY BEFORE THE PROCEDURE:  CLEAR liquids only for entire day prior. Nothing red, orange or purple.  You MAY have:  Soda  Water  Broth Gatorade  Jell-O  Popsicles Coffee/tea without  Milk/creamer     YOU MAY NOT HAVE:  Solid foods  Milk and milk products  Juice with pulp    BOWEL PREPARATION: Includes: One (1) 238-gram container of Miralax (polyethylene glycol 3350), four (4) 5 mg Dulcolax (bisacodyl) tablets, and 64-ounces of Gatorade (sports drink). Preparation may be refrigerated. Entire bowel prep should be completed.    Afternoon before the procedure (2:00 pm - 5:00 pm):  Take two (2) 5 mg Dulcolax laxative tablets.    Evening before the procedure (6:00 pm):  Mix entire container of Miralax with 64-ounces of Gatorade and shake until all medication is dissolved.  Begin drinking solution. Drink an eight (8) ounce cup every 10-15 minutes until you have consumed half (32 ounces) of the  solution. Refrigerate remaining solution.    Night before the procedure (8:00 pm):  Take two (2) 5 mg Dulcolax laxative tablets.    Beginning 5 hours before your procedure:  Drink the remaining amount of prepared solution (32 ounces). Drink an eight (8) ounce cup every 10-15 minutes until you have consumed the remaining solution.      Bowel prep should be completed 4 hours prior to procedure time.  NOTHING TO EAT OR DRINK AFTER MIDNIGHT -EXCEPT YOUR BOWEL PREP                                                                                                                                                                                DAY OF THE PROCEDURE:  You may brush your teeth.  Leave all jewelry at home. Take out any facial piercings, if able.  Please arrive for your procedure as indicated by the OR / GI Lab / Endoscopy Unit. The hospital will contact you the day before with your exact arrival time.  Make sure you have arranged ahead of time for a responsible adult (18 or older) to accompany and drive you home after the procedure. Please discuss any transportation concerns with our staff prior to your procedure.  The effects of the anesthesia can persist for 24 hours. After receiving the sedation, you must exercise caution before engaging in any activity that could harm yourself and others (such as driving a car). Do not make any important decisions or do not drink any alcoholic beverages during this time period.  After your procedure, you may have anything you’d like to eat or drink. You will probably want to start with something light. Please include plenty of fluids. Avoid items that cause gas such as sodas and salads.      SPECIAL INSTRUCTIONS:    For patients currently taking blood thinners and/or antiplatelet therapy our office will contact the prescribing provider. Our office will contact you with any required changes to your medication regimen.  Blood thinner (i.e. - Coumadin, Pradaxa, Lovenox, Xarelto,  Eliquis)    Antiplatelet (i.e. - Plavix, Aggrenox, Effient, Brilinta)      Diabetes:  If you are Diabetic, please see separate Diabetic Instruction Sheet.  Prescribed medications:  Do not stop your aspirin, or any of your other medications (unless instructed otherwise).  Take the rest of your prescribed medications with small sips of water at least 2 hours prior to your procedure.      NOTHING TO EAT OR DRINK (INCLUDING CHEWING GUM) AFTER 12 MIDNIGHT PRIOR TO YOUR PROCEDURE EXCEPT YOUR BOWEL PREP.        For any questions or concerns related to your bowel preparation or pre-procedure instructions, please contact our office.  Thank you for choosing [facility]’s Colon & Rectal Surgery!    Revised 1/2023    [phone]

## 2024-10-01 NOTE — ASSESSMENT & PLAN NOTE
Dannielle is a 79-year-old female, in good performance status and health.  She had 2018 colonoscopy area, negative biopsies, random.  She is having normal formed bowel movements at this time.  We reviewed a 2019 colonoscopy with diverticulitis at the sigmoid position, which was treated.    We discussed repeat colonoscopy at this point as it has been 5 years and was the prior recommendation, for screening.  Abdominal exam is benign today, without any alarm signs and she continues to have normal formed bowel movements, maintains weight, no rectal bleeding.    Colonoscopy was scheduled on checkout today

## 2024-10-17 ENCOUNTER — TELEPHONE (OUTPATIENT)
Age: 79
End: 2024-10-17

## 2024-10-17 DIAGNOSIS — F41.9 ANXIETY: Primary | ICD-10-CM

## 2024-10-17 NOTE — TELEPHONE ENCOUNTER
Patient had spoken to Dr CORMIER previously regarding prescribing her something to calm her nerves. Her  is on a decline and he requires to be next to her all the time. She feels overwhelmed and getting to her. She does not have any issues with sleeping. Please look into this and give her a call back to advise.    Pharmacy she locally uses if agreed to send something.  [id number] - [place] - [address] [facility]  P: [phone]  F: [phone]

## 2024-10-18 DIAGNOSIS — F41.8 SITUATIONAL ANXIETY: Primary | ICD-10-CM

## 2024-10-18 RX ORDER — LORAZEPAM 0.5 MG/1
0.5 TABLET ORAL EVERY 8 HOURS PRN
Qty: 30 TABLET | Refills: 0 | Status: SHIPPED | OUTPATIENT
Start: 2024-10-18 | End: 2024-10-18

## 2024-10-18 RX ORDER — LORAZEPAM 0.5 MG/1
0.5 TABLET ORAL EVERY 8 HOURS PRN
Qty: 30 TABLET | Refills: 0 | Status: SHIPPED | OUTPATIENT
Start: 2024-10-18

## 2024-10-18 NOTE — TELEPHONE ENCOUNTER
Patient called very upset that the change to [facility] has not been made. She does not want mail order delivered to her home because she prefers her  not to know she is taking it.

## 2024-10-18 NOTE — TELEPHONE ENCOUNTER
Pt stated that the  LORazepam (Ativan) 0.5 mg tablet was sent to the wrong pharmacy. On 10/17/24 pt requested that the med for her nerves be sent to:       [id number] - [place] - [address]       Please contact pt before end of day today when it is sent.  Thank you for your kind assistance.

## 2024-10-18 NOTE — TELEPHONE ENCOUNTER
Dannielle is calling again to make sure that the Lorazepam gets cancelled at the mail order.     She needs this to go to [facility] in [place]    Please advise before Mail order fills and sends out for delivery.

## 2024-11-03 DIAGNOSIS — I10 ESSENTIAL HYPERTENSION: ICD-10-CM

## 2024-11-03 DIAGNOSIS — E78.01 FAMILIAL HYPERCHOLESTEROLEMIA: ICD-10-CM

## 2024-11-04 RX ORDER — ROSUVASTATIN CALCIUM 20 MG/1
20 TABLET, COATED ORAL DAILY
Qty: 90 TABLET | Refills: 0 | Status: SHIPPED | OUTPATIENT
Start: 2024-11-04

## 2024-11-04 RX ORDER — LISINOPRIL 10 MG/1
10 TABLET ORAL DAILY
Qty: 90 TABLET | Refills: 0 | Status: SHIPPED | OUTPATIENT
Start: 2024-11-04

## 2024-11-07 ENCOUNTER — APPOINTMENT (OUTPATIENT)
Dept: LAB | Facility: CLINIC | Age: 79
End: 2024-11-07
Payer: COMMERCIAL

## 2024-11-07 DIAGNOSIS — E78.2 MIXED HYPERLIPIDEMIA: ICD-10-CM

## 2024-11-07 DIAGNOSIS — I10 PRIMARY HYPERTENSION: ICD-10-CM

## 2024-11-07 DIAGNOSIS — E03.8 SUBCLINICAL HYPOTHYROIDISM: ICD-10-CM

## 2024-11-07 LAB
ALBUMIN SERPL BCG-MCNC: 4.1 G/DL (ref 3.5–5)
ALP SERPL-CCNC: 72 U/L (ref 34–104)
ALT SERPL W P-5'-P-CCNC: 18 U/L (ref 7–52)
ANION GAP SERPL CALCULATED.3IONS-SCNC: 9 MMOL/L (ref 4–13)
AST SERPL W P-5'-P-CCNC: 23 U/L (ref 13–39)
BASOPHILS # BLD AUTO: 0.09 THOUSANDS/ÂΜL (ref 0–0.1)
BASOPHILS NFR BLD AUTO: 1 % (ref 0–1)
BILIRUB SERPL-MCNC: 0.42 MG/DL (ref 0.2–1)
BUN SERPL-MCNC: 16 MG/DL (ref 5–25)
CALCIUM SERPL-MCNC: 8.7 MG/DL (ref 8.4–10.2)
CHLORIDE SERPL-SCNC: 105 MMOL/L (ref 96–108)
CHOLEST SERPL-MCNC: 148 MG/DL
CO2 SERPL-SCNC: 28 MMOL/L (ref 21–32)
CREAT SERPL-MCNC: 0.8 MG/DL (ref 0.6–1.3)
EOSINOPHIL # BLD AUTO: 0.2 THOUSAND/ÂΜL (ref 0–0.61)
EOSINOPHIL NFR BLD AUTO: 3 % (ref 0–6)
ERYTHROCYTE [DISTWIDTH] IN BLOOD BY AUTOMATED COUNT: 12.8 % (ref 11.6–15.1)
GFR SERPL CREATININE-BSD FRML MDRD: 71 ML/MIN/1.73SQ M
GLUCOSE P FAST SERPL-MCNC: 94 MG/DL (ref 65–99)
HCT VFR BLD AUTO: 44 % (ref 34.8–46.1)
HDLC SERPL-MCNC: 48 MG/DL
HGB BLD-MCNC: 14.4 G/DL (ref 11.5–15.4)
IMM GRANULOCYTES # BLD AUTO: 0.02 THOUSAND/UL (ref 0–0.2)
IMM GRANULOCYTES NFR BLD AUTO: 0 % (ref 0–2)
LDLC SERPL CALC-MCNC: 59 MG/DL (ref 0–100)
LYMPHOCYTES # BLD AUTO: 3.22 THOUSANDS/ÂΜL (ref 0.6–4.47)
LYMPHOCYTES NFR BLD AUTO: 42 % (ref 14–44)
MCH RBC QN AUTO: 30.2 PG (ref 26.8–34.3)
MCHC RBC AUTO-ENTMCNC: 32.7 G/DL (ref 31.4–37.4)
MCV RBC AUTO: 92 FL (ref 82–98)
MONOCYTES # BLD AUTO: 0.52 THOUSAND/ÂΜL (ref 0.17–1.22)
MONOCYTES NFR BLD AUTO: 7 % (ref 4–12)
NEUTROPHILS # BLD AUTO: 3.68 THOUSANDS/ÂΜL (ref 1.85–7.62)
NEUTS SEG NFR BLD AUTO: 47 % (ref 43–75)
NONHDLC SERPL-MCNC: 100 MG/DL
NRBC BLD AUTO-RTO: 0 /100 WBCS
PLATELET # BLD AUTO: 252 THOUSANDS/UL (ref 149–390)
PMV BLD AUTO: 9.7 FL (ref 8.9–12.7)
POTASSIUM SERPL-SCNC: 4.2 MMOL/L (ref 3.5–5.3)
PROT SERPL-MCNC: 7 G/DL (ref 6.4–8.4)
RBC # BLD AUTO: 4.77 MILLION/UL (ref 3.81–5.12)
SODIUM SERPL-SCNC: 142 MMOL/L (ref 135–147)
TRIGL SERPL-MCNC: 206 MG/DL
TSH SERPL DL<=0.05 MIU/L-ACNC: 3.95 UIU/ML (ref 0.45–4.5)
WBC # BLD AUTO: 7.73 THOUSAND/UL (ref 4.31–10.16)

## 2024-11-07 PROCEDURE — 80061 LIPID PANEL: CPT

## 2024-11-07 PROCEDURE — 36415 COLL VENOUS BLD VENIPUNCTURE: CPT

## 2024-11-07 PROCEDURE — 84443 ASSAY THYROID STIM HORMONE: CPT

## 2024-11-07 PROCEDURE — 85025 COMPLETE CBC W/AUTO DIFF WBC: CPT

## 2024-11-07 PROCEDURE — 80053 COMPREHEN METABOLIC PANEL: CPT

## 2024-11-19 ENCOUNTER — TELEPHONE (OUTPATIENT)
Age: 79
End: 2024-11-19

## 2024-11-19 DIAGNOSIS — F41.9 ANXIETY: ICD-10-CM

## 2024-11-19 RX ORDER — LORAZEPAM 0.5 MG/1
0.5 TABLET ORAL EVERY 8 HOURS PRN
Qty: 30 TABLET | Refills: 0 | Status: SHIPPED | OUTPATIENT
Start: 2024-11-19

## 2024-11-19 NOTE — TELEPHONE ENCOUNTER
"Patient called the RX Refill Line. Message is being forwarded to the office.     Patient said she has a Medicare questionnaire, \"Your Wellness Visit Guide\" to fill out. She is not sure what to do with it. Is she supposed to bring it with her at her scheduled appointment, 11/26/24? Also, she spoke to other people that have been able to fill this form out on yeppt, but she is not able to access it.     Please contact patient at phone #[phone]  "

## 2024-11-19 NOTE — TELEPHONE ENCOUNTER
Reason for call:   [x] Refill   [] Prior Auth  [] Other:     Office:   [x] PCP/Provider - Mahsa WAITE / [place]    Medication: lorazepam    Dose/Frequency: 0.5mg q8 prn    Quantity: 30    Pharmacy: [id number] - [place] - [address] [facility]     Does the patient have enough for 3 days?   [] Yes   [x] No - Send as HP to POD

## 2024-11-26 ENCOUNTER — TELEPHONE (OUTPATIENT)
Age: 79
End: 2024-11-26

## 2024-11-26 ENCOUNTER — OFFICE VISIT (OUTPATIENT)
Dept: FAMILY MEDICINE CLINIC | Facility: CLINIC | Age: 79
End: 2024-11-26
Payer: COMMERCIAL

## 2024-11-26 VITALS
DIASTOLIC BLOOD PRESSURE: 74 MMHG | HEART RATE: 65 BPM | RESPIRATION RATE: 20 BRPM | BODY MASS INDEX: 29.84 KG/M2 | TEMPERATURE: 97.5 F | HEIGHT: 59 IN | WEIGHT: 148 LBS | OXYGEN SATURATION: 96 % | SYSTOLIC BLOOD PRESSURE: 120 MMHG

## 2024-11-26 DIAGNOSIS — E78.2 MIXED HYPERLIPIDEMIA: ICD-10-CM

## 2024-11-26 DIAGNOSIS — Z00.00 MEDICARE ANNUAL WELLNESS VISIT, SUBSEQUENT: ICD-10-CM

## 2024-11-26 DIAGNOSIS — E03.8 SUBCLINICAL HYPOTHYROIDISM: ICD-10-CM

## 2024-11-26 DIAGNOSIS — F32.9 REACTIVE DEPRESSION: ICD-10-CM

## 2024-11-26 DIAGNOSIS — I10 PRIMARY HYPERTENSION: Primary | ICD-10-CM

## 2024-11-26 DIAGNOSIS — N39.3 STRESS INCONTINENCE: ICD-10-CM

## 2024-11-26 DIAGNOSIS — D03.72 MELANOMA IN SITU OF LEFT LOWER EXTREMITY (HCC): ICD-10-CM

## 2024-11-26 DIAGNOSIS — N18.31 STAGE 3A CHRONIC KIDNEY DISEASE (HCC): ICD-10-CM

## 2024-11-26 DIAGNOSIS — M85.89 OSTEOPENIA OF MULTIPLE SITES: ICD-10-CM

## 2024-11-26 PROBLEM — Z12.11 ENCOUNTER FOR SCREENING COLONOSCOPY: Status: RESOLVED | Noted: 2024-10-01 | Resolved: 2024-11-26

## 2024-11-26 PROCEDURE — G0444 DEPRESSION SCREEN ANNUAL: HCPCS | Performed by: FAMILY MEDICINE

## 2024-11-26 PROCEDURE — 99214 OFFICE O/P EST MOD 30 MIN: CPT | Performed by: FAMILY MEDICINE

## 2024-11-26 PROCEDURE — G0439 PPPS, SUBSEQ VISIT: HCPCS | Performed by: FAMILY MEDICINE

## 2024-11-26 RX ORDER — ESCITALOPRAM OXALATE 10 MG/1
10 TABLET ORAL DAILY
Qty: 30 TABLET | Refills: 5 | Status: SHIPPED | OUTPATIENT
Start: 2024-11-26 | End: 2025-05-25

## 2024-11-26 RX ORDER — ESCITALOPRAM OXALATE 10 MG/1
10 TABLET ORAL DAILY
Qty: 30 TABLET | Refills: 5 | Status: SHIPPED | OUTPATIENT
Start: 2024-11-26 | End: 2024-11-26

## 2024-11-26 NOTE — ASSESSMENT & PLAN NOTE
BP Readings from Last 3 Encounters:   11/26/24 120/74   10/01/24 122/74   11/22/23 116/76     Blood pressure controlled, continue lisinopril 10 mg daily    Orders:    Basic metabolic panel; Future    CBC; Future

## 2024-11-26 NOTE — ASSESSMENT & PLAN NOTE
Lab Results   Component Value Date    PII9SVTPEEOC 3.953 11/07/2024    TSH 3.280 01/24/2019     Stable. Continue to monitor     Orders:    TSH + Free T4; Future

## 2024-11-26 NOTE — PATIENT INSTRUCTIONS
Medicare Preventive Visit Patient Instructions  Thank you for completing your Welcome to Medicare Visit or Medicare Annual Wellness Visit today. Your next wellness visit will be due in one year (11/27/2025).  The screening/preventive services that you may require over the next 5-10 years are detailed below. Some tests may not apply to you based off risk factors and/or age. Screening tests ordered at today's visit but not completed yet may show as past due. Also, please note that scanned in results may not display below.  Preventive Screenings:  Service Recommendations Previous Testing/Comments   Colorectal Cancer Screening  * Colonoscopy    * Fecal Occult Blood Test (FOBT)/Fecal Immunochemical Test (FIT)  * Fecal DNA/Cologuard Test  * Flexible Sigmoidoscopy Age: 45-75 years old   Colonoscopy: every 10 years (may be performed more frequently if at higher risk)  OR  FOBT/FIT: every 1 year  OR  Cologuard: every 3 years  OR  Sigmoidoscopy: every 5 years  Screening may be recommended earlier than age 45 if at higher risk for colorectal cancer. Also, an individualized decision between you and your healthcare provider will decide whether screening between the ages of 76-85 would be appropriate. Colonoscopy: 08/15/2019  FOBT/FIT: Not on file  Cologuard: Not on file  Sigmoidoscopy: Not on file          Breast Cancer Screening Age: 40+ years old  Frequency: every 1-2 years  Not required if history of left and right mastectomy Mammogram: 04/12/2024    Screening Current   Cervical Cancer Screening Between the ages of 21-29, pap smear recommended once every 3 years.   Between the ages of 30-65, can perform pap smear with HPV co-testing every 5 years.   Recommendations may differ for women with a history of total hysterectomy, cervical cancer, or abnormal pap smears in past. Pap Smear: 02/19/2020    Screening Not Indicated   Hepatitis C Screening Once for adults born between 1945 and 1965  More frequently in patients at high risk  for Hepatitis C Hep C Antibody: 01/24/2019    Screening Current   Diabetes Screening 1-2 times per year if you're at risk for diabetes or have pre-diabetes Fasting glucose: 94 mg/dL (11/7/2024)  A1C: No results in last 5 years (No results in last 5 years)  Screening Current   Cholesterol Screening Once every 5 years if you don't have a lipid disorder. May order more often based on risk factors. Lipid panel: 11/07/2024    Screening Not Indicated  History Lipid Disorder     Other Preventive Screenings Covered by Medicare:  Abdominal Aortic Aneurysm (AAA) Screening: covered once if your at risk. You're considered to be at risk if you have a family history of AAA.  Lung Cancer Screening: covers low dose CT scan once per year if you meet all of the following conditions: (1) Age 55-77; (2) No signs or symptoms of lung cancer; (3) Current smoker or have quit smoking within the last 15 years; (4) You have a tobacco smoking history of at least 20 pack years (packs per day multiplied by number of years you smoked); (5) You get a written order from a healthcare provider.  Glaucoma Screening: covered annually if you're considered high risk: (1) You have diabetes OR (2) Family history of glaucoma OR (3)  aged 50 and older OR (4)  American aged 65 and older  Osteoporosis Screening: covered every 2 years if you meet one of the following conditions: (1) You're estrogen deficient and at risk for osteoporosis based off medical history and other findings; (2) Have a vertebral abnormality; (3) On glucocorticoid therapy for more than 3 months; (4) Have primary hyperparathyroidism; (5) On osteoporosis medications and need to assess response to drug therapy.   Last bone density test (DXA Scan): 04/06/2023.  HIV Screening: covered annually if you're between the age of 15-65. Also covered annually if you are younger than 15 and older than 65 with risk factors for HIV infection. For pregnant patients, it is covered up  to 3 times per pregnancy.    Immunizations:  Immunization Recommendations   Influenza Vaccine Annual influenza vaccination during flu season is recommended for all persons aged >= 6 months who do not have contraindications   Pneumococcal Vaccine   * Pneumococcal conjugate vaccine = PCV13 (Prevnar 13), PCV15 (Vaxneuvance), PCV20 (Prevnar 20)  * Pneumococcal polysaccharide vaccine = PPSV23 (Pneumovax) Adults 19-65 yo with certain risk factors or if 65+ yo  If never received any pneumonia vaccine: recommend Prevnar 20 (PCV20)  Give PCV20 if previously received 1 dose of PCV13 or PPSV23   Hepatitis B Vaccine 3 dose series if at intermediate or high risk (ex: diabetes, end stage renal disease, liver disease)   Respiratory syncytial virus (RSV) Vaccine - COVERED BY MEDICARE PART D  * RSVPreF3 (Arexvy) CDC recommends that adults 60 years of age and older may receive a single dose of RSV vaccine using shared clinical decision-making (SCDM)   Tetanus (Td) Vaccine - COST NOT COVERED BY MEDICARE PART B Following completion of primary series, a booster dose should be given every 10 years to maintain immunity against tetanus. Td may also be given as tetanus wound prophylaxis.   Tdap Vaccine - COST NOT COVERED BY MEDICARE PART B Recommended at least once for all adults. For pregnant patients, recommended with each pregnancy.   Shingles Vaccine (Shingrix) - COST NOT COVERED BY MEDICARE PART B  2 shot series recommended in those 19 years and older who have or will have weakened immune systems or those 50 years and older     Health Maintenance Due:      Topic Date Due   • Colorectal Cancer Screening  08/15/2024   • Breast Cancer Screening: Mammogram  04/12/2025   • Hepatitis C Screening  Completed     Immunizations Due:      Topic Date Due   • Influenza Vaccine (1) 09/01/2024   • COVID-19 Vaccine (3 - 2024-25 season) 09/01/2024     Advance Directives   What are advance directives?  Advance directives are legal documents that state  your wishes and plans for medical care. These plans are made ahead of time in case you lose your ability to make decisions for yourself. Advance directives can apply to any medical decision, such as the treatments you want, and if you want to donate organs.   What are the types of advance directives?  There are many types of advance directives, and each state has rules about how to use them. You may choose a combination of any of the following:  Living will:  This is a written record of the treatment you want. You can also choose which treatments you do not want, which to limit, and which to stop at a certain time. This includes surgery, medicine, IV fluid, and tube feedings.   Durable power of  for healthcare (DPAHC):  This is a written record that states who you want to make healthcare choices for you when you are unable to make them for yourself. This person, called a proxy, is usually a family member or a friend. You may choose more than 1 proxy.  Do not resuscitate (DNR) order:  A DNR order is used in case your heart stops beating or you stop breathing. It is a request not to have certain forms of treatment, such as CPR. A DNR order may be included in other types of advance directives.  Medical directive:  This covers the care that you want if you are in a coma, near death, or unable to make decisions for yourself. You can list the treatments you want for each condition. Treatment may include pain medicine, surgery, blood transfusions, dialysis, IV or tube feedings, and a ventilator (breathing machine).  Values history:  This document has questions about your views, beliefs, and how you feel and think about life. This information can help others choose the care that you would choose.  Why are advance directives important?  An advance directive helps you control your care. Although spoken wishes may be used, it is better to have your wishes written down. Spoken wishes can be misunderstood, or not  followed. Treatments may be given even if you do not want them. An advance directive may make it easier for your family to make difficult choices about your care.   Depression   Depression  is a medical condition that causes feelings of sadness or hopelessness that do not go away. Depression may cause you to lose interest in things you used to enjoy. These feelings may interfere with your daily life.  Call your local emergency number (911 in the ) if:   You think about harming yourself or someone else.  You have done something on purpose to hurt yourself.  The following resources are available at any time to help you, if needed:   National Suicide Prevention Lifeline: 1-475.682.4911 (7-250-225-TALK)   Suicide Hotline: 1-261.661.1277 (7-643-CHGXYOW)   For a list of international numbers: https://save.org/find-help/international-resources/  Treatment for depression may include medicine to relieve depression. Medicine is often used together with therapy. Therapy is a way for you to talk about your feelings and anything that may be causing depression. Therapy can be done alone or in a group. It may also be done with family members or a significant other.  Get regular physical activity.    Create a regular sleep schedule.    Eat a variety of healthy foods.    Do not drink alcohol or use drugs.     Urinary Incontinence   Urinary incontinence (UI)  is when you lose control of your bladder. UI develops because your bladder cannot store or empty urine properly. The 3 most common types of UI are stress incontinence, urge incontinence, or both.  Medicines:   May be given to help strengthen your bladder control. Report any side effects of medication to your healthcare provider.  Do pelvic muscle exercises often:  Your pelvic muscles help you stop urinating. Squeeze these muscles tight for 5 seconds, then relax for 5 seconds. Gradually work up to squeezing for 10 seconds. Do 3 sets of 15 repetitions a day, or as directed. This  will help strengthen your pelvic muscles and improve bladder control.  Train your bladder:  Go to the bathroom at set times, such as every 2 hours, even if you do not feel the urge to go. You can also try to hold your urine when you feel the urge to go. For example, hold your urine for 5 minutes when you feel the urge to go. As that becomes easier, hold your urine for 10 minutes.   Self-care:   Keep a UI record.  Write down how often you leak urine and how much you leak. Make a note of what you were doing when you leaked urine.  Drink liquids as directed. You may need to limit the amount of liquid you drink to help control your urine leakage. Do not drink any liquid right before you go to bed. Limit or do not have drinks that contain caffeine or alcohol.   Prevent constipation.  Eat a variety of high-fiber foods. Good examples are high-fiber cereals, beans, vegetables, and whole-grain breads. Walking is the best way to trigger your intestines to have a bowel movement.  Exercise regularly and maintain a healthy weight.  Weight loss and exercise will decrease pressure on your bladder and help you control your leakage.   Use a catheter as directed  to help empty your bladder. A catheter is a tiny, plastic tube that is put into your bladder to drain your urine.   Go to behavior therapy as directed.  Behavior therapy may be used to help you learn to control your urge to urinate.    Weight Management   Why it is important to manage your weight:  Being overweight increases your risk of health conditions such as heart disease, high blood pressure, type 2 diabetes, and certain types of cancer. It can also increase your risk for osteoarthritis, sleep apnea, and other respiratory problems. Aim for a slow, steady weight loss. Even a small amount of weight loss can lower your risk of health problems.  How to lose weight safely:  A safe and healthy way to lose weight is to eat fewer calories and get regular exercise. You can lose  up about 1 pound a week by decreasing the number of calories you eat by 500 calories each day.   Healthy meal plan for weight management:  A healthy meal plan includes a variety of foods, contains fewer calories, and helps you stay healthy. A healthy meal plan includes the following:  Eat whole-grain foods more often.  A healthy meal plan should contain fiber. Fiber is the part of grains, fruits, and vegetables that is not broken down by your body. Whole-grain foods are healthy and provide extra fiber in your diet. Some examples of whole-grain foods are whole-wheat breads and pastas, oatmeal, brown rice, and bulgur.  Eat a variety of vegetables every day.  Include dark, leafy greens such as spinach, kale, rod greens, and mustard greens. Eat yellow and orange vegetables such as carrots, sweet potatoes, and winter squash.   Eat a variety of fruits every day.  Choose fresh or canned fruit (canned in its own juice or light syrup) instead of juice. Fruit juice has very little or no fiber.  Eat low-fat dairy foods.  Drink fat-free (skim) milk or 1% milk. Eat fat-free yogurt and low-fat cottage cheese. Try low-fat cheeses such as mozzarella and other reduced-fat cheeses.  Choose meat and other protein foods that are low in fat.  Choose beans or other legumes such as split peas or lentils. Choose fish, skinless poultry (chicken or turkey), or lean cuts of red meat (beef or pork). Before you cook meat or poultry, cut off any visible fat.   Use less fat and oil.  Try baking foods instead of frying them. Add less fat, such as margarine, sour cream, regular salad dressing and mayonnaise to foods. Eat fewer high-fat foods. Some examples of high-fat foods include french fries, doughnuts, ice cream, and cakes.  Eat fewer sweets.  Limit foods and drinks that are high in sugar. This includes candy, cookies, regular soda, and sweetened drinks.  Exercise:  Exercise at least 30 minutes per day on most days of the week. Some  examples of exercise include walking, biking, dancing, and swimming. You can also fit in more physical activity by taking the stairs instead of the elevator or parking farther away from stores. Ask your healthcare provider about the best exercise plan for you.      © Copyright [employer] 2018 Information is for End User's use only and may not be sold, redistributed or otherwise used for commercial purposes. All illustrations and images included in CareNotes® are the copyrighted property of [employer] or [employer]

## 2024-11-26 NOTE — PROGRESS NOTES
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)  Left leg melanoma removed in 2019   She follows with dermatology         Stage 3a chronic kidney disease (HCC)  Lab Results   Component Value Date    EGFR 71 2024    EGFR 68 2023    EGFR 53 2022    CREATININE 0.80 2024    CREATININE 0.83 2023    CREATININE 1.02 2022     Stable avoid nephrotoxic medication  Orders:    Basic metabolic panel; Future    Stress incontinence         Medicare annual wellness visit, subsequent         Mixed hyperlipidemia  Lab Results   Component Value Date    CHOL 164 2015    HDL 48 (L) 2024    LDLCALC 59 2024    TRIG 206 (H) 2024     Controlled, continue rosuvastatin 20 mg daily  Counseled on healthy lifestyle     Orders:    Lipid panel; Future    Subclinical hypothyroidism  Lab Results   Component Value Date    YLB4YEEHMSQP 3.953 2024    TSH 3.280 2019     Stable. Continue to monitor     Orders:    TSH + Free T4; Future    Osteopenia of multiple sites  Stable. Reviewed dexa scan.   She was started on Alendronate 70 mg weekly in 2020 she will continue treatment through 2025.  To complete 5 years of treatment.  Repeat DEXA scan in the future.  Continue calcium and vitamin D         Reactive depression  Depression Screening Follow-up Plan: Patient's depression screening was positive with a PHQ-2 score of 3. Their PHQ-9 score was 13. Patient assessed for underlying major depression. They have no active suicidal  ideations. Brief counseling provided and recommend additional follow-up/re-evaluation next office visit.    Start lexapro 10 mg daily. Ativan for breakthrough anxiety.  has brain cancer.   Orders:    escitalopram (LEXAPRO) 10 mg tablet; Take 1 tablet (10 mg total) by mouth daily    AWV and follow-up completed today.  Reviewed previous labs.  Chronic conditions stable.  Continue current medications.      Depression Screening and Follow-up Plan: Patient's depression screening was positive with a PHQ-2 score of 3. Their PHQ-9 score was 13. Patient assessed for underlying major depression. Brief counseling provided and recommend additional follow-up/re-evaluation next office visit.     Urinary Incontinence Plan of Care: counseling topics discussed: practice Kegel (pelvic floor strengthening) exercises and use restroom every 2 hours.       Preventive health issues were discussed with patient, and age appropriate screening tests were ordered as noted in patient's After Visit Summary. Personalized health advice and appropriate referrals for health education or preventive services given if needed, as noted in patient's After Visit Summary.    History of Present Illness     AWV and follow up patient's depression screen positive today however she is under significant stress as her  just underwent removal of brain tumor. Not doing well.   She's feeling depressed. No thoughts of suicide or self harm.              Patient Care Team:  Clinton Almaraz MD as PCP - General (Family Medicine)  Jean-Paul Hogan (Dermatology)  Jc Montaño MD (Oncology)  Morris Mart OD (Optometry)  Andrey Beverly MD (Colon and Rectal Surgery)  Aries Resendiz DPM (Podiatry)  Jerald Borja MD (Orthopedic Surgery)    Review of Systems   Constitutional:  Negative for activity change, fatigue and fever.   Eyes:  Negative for visual disturbance.   Respiratory:  Negative for shortness of breath.     Cardiovascular:  Negative for chest pain.   Gastrointestinal:  Negative for abdominal pain, constipation, diarrhea and nausea.   Endocrine: Negative for cold intolerance and heat intolerance.   Musculoskeletal:  Negative for back pain.   Skin:  Negative for rash.   Neurological:  Negative for headaches.   Psychiatric/Behavioral:  Positive for dysphoric mood. Negative for confusion.      Medical History Reviewed by provider this encounter:  Tobacco  Allergies  Meds  Problems  Med Hx  Surg Hx  Fam Hx       Annual Wellness Visit Questionnaire   Dannielle is here for her Subsequent Wellness visit. Last Medicare Wellness visit information reviewed, patient interviewed and updates made to the record today.      Health Risk Assessment:   Patient rates overall health as good. Patient feels that their physical health rating is slightly worse. Patient is dissatisfied with their life. Eyesight was rated as slightly worse. Hearing was rated as slightly worse. Patient feels that their emotional and mental health rating is slightly worse. Patients states they are sometimes angry. Patient states they are sometimes unusually tired/fatigued. Pain experienced in the last 7 days has been some. Patient's pain rating has been 4/10. Patient states that she has experienced no weight loss or gain in last 6 months.     Depression Screening:   PHQ-2 Score: 3  PHQ-9 Score: 13      Fall Risk Screening:   In the past year, patient has experienced: no history of falling in past year      Urinary Incontinence Screening:   Patient has leaked urine accidently in the last six months.     Home Safety:  Patient does not have trouble with stairs inside or outside of their home. Patient has working smoke alarms and has no working carbon monoxide detector. Home safety hazards include: none.     Nutrition:   Current diet is Regular.     Medications:   Patient is currently taking over-the-counter supplements. OTC medications include: see medication  list. Patient is able to manage medications.     Activities of Daily Living (ADLs)/Instrumental Activities of Daily Living (IADLs):   Walk and transfer into and out of bed and chair?: Yes  Dress and groom yourself?: Yes    Bathe or shower yourself?: Yes    Feed yourself? Yes  Do your laundry/housekeeping?: Yes  Manage your money, pay your bills and track your expenses?: Yes  Make your own meals?: Yes    Do your own shopping?: Yes    Previous Hospitalizations:   Any hospitalizations or ED visits within the last 12 months?: No      Advance Care Planning:   Living will: No    Advanced directive: No      Cognitive Screening:   Provider or family/friend/caregiver concerned regarding cognition?: No    PREVENTIVE SCREENINGS      Cardiovascular Screening:    General: Screening Not Indicated and History Lipid Disorder      Diabetes Screening:     General: Screening Current      Breast Cancer Screening:     General: Screening Current      Cervical Cancer Screening:    General: Screening Not Indicated      Lung Cancer Screening:     General: Screening Not Indicated      Hepatitis C Screening:    General: Screening Current    Screening, Brief Intervention, and Referral to Treatment (SBIRT)    Screening  Typical number of drinks in a day: 0  Typical number of drinks in a week: 1  Interpretation: Low risk drinking behavior.    Single Item Drug Screening:  How often have you used an illegal drug (including marijuana) or a prescription medication for non-medical reasons in the past year? never    Single Item Drug Screen Score: 0  Interpretation: Negative screen for possible drug use disorder    Annual Depression Screening  Time spent screening and evaluating the patient for depression during today's encounter was 5 minutes.    Other Counseling Topics:   Car/seat belt/driving safety, skin self-exam, sunscreen and calcium and vitamin D intake and regular weightbearing exercise.     Social Drivers of Health     Financial Resource  "Strain: Low Risk  (11/19/2023)    Overall Financial Resource Strain (CARDIA)     Difficulty of Paying Living Expenses: Not hard at all   Food Insecurity: No Food Insecurity (11/26/2024)    Hunger Vital Sign     Worried About Running Out of Food in the Last Year: Never true     Ran Out of Food in the Last Year: Never true   Transportation Needs: No Transportation Needs (11/26/2024)    PRAPARE - Transportation     Lack of Transportation (Medical): No     Lack of Transportation (Non-Medical): No   Housing Stability: Low Risk  (11/26/2024)    Housing Stability Vital Sign     Unable to Pay for Housing in the Last Year: No     Number of Times Moved in the Last Year: 0     Homeless in the Last Year: No   Utilities: Not At Risk (11/26/2024)    [facility] Utilities     Threatened with loss of utilities: No     No results found.    Objective   /74 (BP Location: Left arm, Patient Position: Sitting, Cuff Size: Standard)   Pulse 65   Temp 97.5 °F (36.4 °C) (Temporal)   Resp 20   Ht 4' 11\" (1.499 m)   Wt 67.1 kg (148 lb)   SpO2 96%   BMI 29.89 kg/m²     Physical Exam  Vitals and nursing note reviewed.   Constitutional:       Appearance: Normal appearance. She is well-developed.   HENT:      Head: Normocephalic and atraumatic.   Cardiovascular:      Rate and Rhythm: Normal rate and regular rhythm.   Pulmonary:      Effort: Pulmonary effort is normal.      Breath sounds: Normal breath sounds.   Abdominal:      General: Bowel sounds are normal.      Palpations: Abdomen is soft.   Musculoskeletal:      Cervical back: Normal range of motion.   Skin:     General: Skin is warm.   Neurological:      General: No focal deficit present.      Mental Status: She is alert.   Psychiatric:         Mood and Affect: Mood is depressed. Affect is tearful.         Speech: Speech normal.         "

## 2024-11-26 NOTE — ASSESSMENT & PLAN NOTE
Lab Results   Component Value Date    CHOL 164 11/12/2015    HDL 48 (L) 11/07/2024    LDLCALC 59 11/07/2024    TRIG 206 (H) 11/07/2024     Controlled, continue rosuvastatin 20 mg daily  Counseled on healthy lifestyle     Orders:    Lipid panel; Future

## 2024-11-26 NOTE — ASSESSMENT & PLAN NOTE
Lab Results   Component Value Date    EGFR 71 11/07/2024    EGFR 68 11/28/2023    EGFR 53 05/13/2022    CREATININE 0.80 11/07/2024    CREATININE 0.83 11/28/2023    CREATININE 1.02 05/13/2022     Stable avoid nephrotoxic medication  Orders:    Basic metabolic panel; Future

## 2024-11-26 NOTE — ASSESSMENT & PLAN NOTE
Stable. Reviewed dexa scan.   She was started on Alendronate 70 mg weekly in January 2020 she will continue treatment through January 2025.  To complete 5 years of treatment.  Repeat DEXA scan in the future.  Continue calcium and vitamin D

## 2024-11-26 NOTE — TELEPHONE ENCOUNTER
Patient called from [facility] and stated that they did not have the escitalopram (Lexapro) 10 mg tablet that was prescribed today. Advised the patient that the pharmacy confirmed receipt of the prescription today at 10:45 am as per the e-prescribing status.  Patient checked with the pharmacist and was then told that they would not be able to fill the medication until 12/19 as it appears it was sent somewhere else to be filled.  Discontinued prescription for the medication to [facility] is also in the chart.  Patient was upset as she did not know what to do.  Clinical assistance was unavailable at the time.  Please advise what be recommended.  Thank you!

## 2025-01-10 DIAGNOSIS — I10 ESSENTIAL HYPERTENSION: ICD-10-CM

## 2025-01-10 DIAGNOSIS — E78.01 FAMILIAL HYPERCHOLESTEROLEMIA: ICD-10-CM

## 2025-01-13 RX ORDER — LISINOPRIL 10 MG/1
10 TABLET ORAL DAILY
Qty: 90 TABLET | Refills: 1 | Status: SHIPPED | OUTPATIENT
Start: 2025-01-13

## 2025-01-13 RX ORDER — ROSUVASTATIN CALCIUM 20 MG/1
20 TABLET, COATED ORAL DAILY
Qty: 90 TABLET | Refills: 1 | Status: SHIPPED | OUTPATIENT
Start: 2025-01-13

## 2025-03-06 ENCOUNTER — OFFICE VISIT (OUTPATIENT)
Dept: FAMILY MEDICINE CLINIC | Facility: CLINIC | Age: 80
End: 2025-03-06
Payer: COMMERCIAL

## 2025-03-06 VITALS
BODY MASS INDEX: 29.43 KG/M2 | WEIGHT: 146 LBS | DIASTOLIC BLOOD PRESSURE: 60 MMHG | RESPIRATION RATE: 16 BRPM | HEIGHT: 59 IN | HEART RATE: 84 BPM | SYSTOLIC BLOOD PRESSURE: 132 MMHG | OXYGEN SATURATION: 97 % | TEMPERATURE: 98.1 F

## 2025-03-06 DIAGNOSIS — H61.21 IMPACTED CERUMEN OF RIGHT EAR: Primary | ICD-10-CM

## 2025-03-06 DIAGNOSIS — H61.22 IMPACTED CERUMEN OF LEFT EAR: ICD-10-CM

## 2025-03-06 PROCEDURE — 99213 OFFICE O/P EST LOW 20 MIN: CPT | Performed by: FAMILY MEDICINE

## 2025-03-06 PROCEDURE — 69210 REMOVE IMPACTED EAR WAX UNI: CPT | Performed by: FAMILY MEDICINE

## 2025-03-06 NOTE — PROGRESS NOTES
"Name: Dannielle Noel      : 1945      MRN: [id number]  Encounter Provider: Clinton Almaraz MD  Encounter Date: 3/6/2025   Encounter department: [facility]  :  Assessment & Plan  Impacted cerumen of left ear         Impacted cerumen of right ear  Cerumen impaction on examination.  Saw her audiologist recently and suggested having ears cleaned as she could not see her eardrum.  She has fullness in the ears.         Ear cerumen removal    Date/Time: 3/6/2025 4:45 PM    Performed by: Clinton Almaraz MD  Authorized by: Clinton Almaraz MD  Universal Protocol:  Consent: Verbal consent obtained.  Consent given by: patient    Patient location:  Clinic  Procedure details:     Location:  R ear    Procedure type: irrigation with instrumentation      Instrumentation: curette    Post-procedure details:     Complication:  None    Hearing quality:  Improved    Patient tolerance of procedure:  Tolerated well, no immediate complications  Ear cerumen removal    Date/Time: 3/6/2025 4:45 PM    Performed by: Clinton Almaraz MD  Authorized by: Clinton Almaraz MD  Universal Protocol:  Consent: Verbal consent obtained.  Consent given by: patient    Patient location:  Clinic  Procedure details:     Location:  L ear    Procedure type: irrigation with instrumentation      Instrumentation: curette    Post-procedure details:     Complication:  None    Hearing quality:  Improved    Patient tolerance of procedure:  Tolerated well, no immediate complications         History of Present Illness   Patient presents with:  Cerumen Impaction: Right           Review of Systems    Objective   /60 (BP Location: Left arm, Patient Position: Sitting, Cuff Size: Large)   Pulse 84   Temp 98.1 °F (36.7 °C) (Temporal)   Resp 16   Ht 4' 11\" (1.499 m)   Wt 66.2 kg (146 lb)   SpO2 97%   BMI 29.49 kg/m²      Physical Exam    "

## 2025-03-20 DIAGNOSIS — F32.9 REACTIVE DEPRESSION: ICD-10-CM

## 2025-03-20 RX ORDER — ESCITALOPRAM OXALATE 10 MG/1
10 TABLET ORAL DAILY
Qty: 30 TABLET | Refills: 5 | Status: SHIPPED | OUTPATIENT
Start: 2025-03-20 | End: 2025-09-16

## 2025-04-16 ENCOUNTER — TELEPHONE (OUTPATIENT)
Age: 80
End: 2025-04-16

## 2025-04-16 NOTE — TELEPHONE ENCOUNTER
Patient received a 30 day supply of Lexapro with 5 refills from her mail order pharmacy. She asked if they will just continue to send 1 bottle a month or did the script need to be changed to a 90 day supply. Please advise.

## 2025-04-17 ENCOUNTER — HOSPITAL ENCOUNTER (OUTPATIENT)
Dept: RADIOLOGY | Age: 80
Discharge: HOME | End: 2025-04-17
Payer: COMMERCIAL

## 2025-04-17 VITALS — BODY MASS INDEX: 29.43 KG/M2 | HEIGHT: 59 IN | WEIGHT: 146 LBS

## 2025-04-17 DIAGNOSIS — Z12.31 ENCOUNTER FOR SCREENING MAMMOGRAM FOR MALIGNANT NEOPLASM OF BREAST: ICD-10-CM

## 2025-04-17 PROCEDURE — 77067 SCR MAMMO BI INCL CAD: CPT

## 2025-04-17 PROCEDURE — 77063 BREAST TOMOSYNTHESIS BI: CPT

## 2025-04-23 DIAGNOSIS — F32.9 REACTIVE DEPRESSION: ICD-10-CM

## 2025-04-23 RX ORDER — ESCITALOPRAM OXALATE 10 MG/1
10 TABLET ORAL DAILY
Qty: 90 TABLET | Refills: 1 | Status: SHIPPED | OUTPATIENT
Start: 2025-04-23 | End: 2025-10-20

## 2025-05-08 ENCOUNTER — TELEPHONE (OUTPATIENT)
Age: 80
End: 2025-05-08

## 2025-05-08 NOTE — TELEPHONE ENCOUNTER
Pt cancelled colon with Dr Beverly on 05/09/25. I called [facility] and spoke with Eula and made them aware.

## 2025-05-27 ENCOUNTER — OFFICE VISIT (OUTPATIENT)
Dept: FAMILY MEDICINE CLINIC | Facility: CLINIC | Age: 80
End: 2025-05-27
Payer: COMMERCIAL

## 2025-05-27 VITALS
HEART RATE: 76 BPM | BODY MASS INDEX: 29.53 KG/M2 | WEIGHT: 146.5 LBS | SYSTOLIC BLOOD PRESSURE: 128 MMHG | HEIGHT: 59 IN | DIASTOLIC BLOOD PRESSURE: 74 MMHG | RESPIRATION RATE: 18 BRPM | OXYGEN SATURATION: 97 % | TEMPERATURE: 97.6 F

## 2025-05-27 DIAGNOSIS — F32.9 REACTIVE DEPRESSION: Primary | ICD-10-CM

## 2025-05-27 DIAGNOSIS — I10 PRIMARY HYPERTENSION: ICD-10-CM

## 2025-05-27 DIAGNOSIS — E78.2 MIXED HYPERLIPIDEMIA: ICD-10-CM

## 2025-05-27 DIAGNOSIS — D03.72 MELANOMA IN SITU OF LEFT LOWER EXTREMITY (HCC): ICD-10-CM

## 2025-05-27 DIAGNOSIS — N18.31 STAGE 3A CHRONIC KIDNEY DISEASE (HCC): ICD-10-CM

## 2025-05-27 PROCEDURE — G2211 COMPLEX E/M VISIT ADD ON: HCPCS | Performed by: FAMILY MEDICINE

## 2025-05-27 PROCEDURE — 99214 OFFICE O/P EST MOD 30 MIN: CPT | Performed by: FAMILY MEDICINE

## 2025-05-27 RX ORDER — ESCITALOPRAM OXALATE 10 MG/1
10 TABLET ORAL DAILY
Qty: 90 TABLET | Refills: 1 | Status: SHIPPED | OUTPATIENT
Start: 2025-05-27 | End: 2025-11-23

## 2025-05-27 NOTE — PROGRESS NOTES
Name: Dannielle Noel      : 1945      MRN: [id number]  Encounter Provider: Clinton Almaraz MD  Encounter Date: 2025   Encounter department: [facility]  :  Assessment & Plan  Reactive depression  Depression stable.  Unfortunately  remains in hospice.  Has noted improvement since starting Lexapro.  Plan to continue lexapro 10 mg daily  Refill sent to pharmacy    Orders:    escitalopram (LEXAPRO) 10 mg tablet; Take 1 tablet (10 mg total) by mouth daily    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 (HCC)         Stage 3a chronic kidney disease (HCC)  Lab Results   Component Value Date    EGFR 71 2024    EGFR 68 2023    EGFR 53 2022    CREATININE 0.80 2024    CREATININE 0.83 2023    CREATININE 1.02 2022            Mixed hyperlipidemia  Obtain blood work.  Continue rosuvastatin 20 mg daily       Primary hypertension  BP Readings from Last 3 Encounters:   25 128/74   25 132/60   24 120/74     Blood pressure controlled, continue lisinopril 10 mg daily  Obtain blood work                History of Present Illness   Patient presents today for 6-month follow-up.  Continues to suffer from depression.  Unfortunately her  remains in hospice is not doing very well.  Patient does feel Lexapro has helped.  She unfortunately has not obtained her blood work yet.  She is stable to remainder of her medications.      Review of Systems   Constitutional:  Negative for activity change, fatigue and fever.   Eyes:  Negative for visual disturbance.   Respiratory:  Negative for shortness of breath.    Cardiovascular:  Negative for chest pain.   Gastrointestinal:  Negative for abdominal pain, constipation, diarrhea and nausea.   Endocrine: Negative for cold intolerance and heat intolerance.   Musculoskeletal:  Negative for back pain.   Skin:  Negative for rash.   Neurological:  Negative for headaches.   Psychiatric/Behavioral:  Positive for  "dysphoric mood. Negative for confusion.        Objective   /74 (BP Location: Left arm, Patient Position: Sitting, Cuff Size: Standard)   Pulse 76   Temp 97.6 °F (36.4 °C) (Temporal)   Resp 18   Ht 4' 11\" (1.499 m)   Wt 66.5 kg (146 lb 8 oz)   SpO2 97%   BMI 29.59 kg/m²      Physical Exam  Vitals and nursing note reviewed.   Constitutional:       Appearance: Normal appearance. She is well-developed.   HENT:      Head: Normocephalic and atraumatic.     Cardiovascular:      Rate and Rhythm: Normal rate and regular rhythm.   Pulmonary:      Effort: Pulmonary effort is normal.      Breath sounds: Normal breath sounds.   Abdominal:      General: Bowel sounds are normal.      Palpations: Abdomen is soft.     Musculoskeletal:      Cervical back: Normal range of motion.     Skin:     General: Skin is warm.     Neurological:      General: No focal deficit present.      Mental Status: She is alert.     Psychiatric:         Mood and Affect: Mood normal.         Speech: Speech normal.         "

## 2025-05-27 NOTE — ASSESSMENT & PLAN NOTE
Lab Results   Component Value Date    EGFR 71 11/07/2024    EGFR 68 11/28/2023    EGFR 53 05/13/2022    CREATININE 0.80 11/07/2024    CREATININE 0.83 11/28/2023    CREATININE 1.02 05/13/2022

## 2025-05-27 NOTE — ASSESSMENT & PLAN NOTE
BP Readings from Last 3 Encounters:   05/27/25 128/74   03/06/25 132/60   11/26/24 120/74     Blood pressure controlled, continue lisinopril 10 mg daily  Obtain blood work

## 2025-06-10 DIAGNOSIS — I10 ESSENTIAL HYPERTENSION: ICD-10-CM

## 2025-06-10 DIAGNOSIS — E78.01 FAMILIAL HYPERCHOLESTEROLEMIA: ICD-10-CM

## 2025-06-11 RX ORDER — LISINOPRIL 10 MG/1
10 TABLET ORAL DAILY
Qty: 90 TABLET | Refills: 1 | Status: SHIPPED | OUTPATIENT
Start: 2025-06-11

## 2025-06-11 RX ORDER — ROSUVASTATIN CALCIUM 20 MG/1
20 TABLET, COATED ORAL DAILY
Qty: 90 TABLET | Refills: 1 | Status: SHIPPED | OUTPATIENT
Start: 2025-06-11

## (undated) DEVICE — SUT NYLON 5-0 P-3 18 IN 690G

## (undated) DEVICE — COBAN 2 IN UNSTERILE

## (undated) DEVICE — OCCLUSIVE GAUZE STRIP,3% BISMUTH TRIBROMOPHENATE IN PETROLATUM BLEND: Brand: XEROFORM

## (undated) DEVICE — SUT MONOCRYL 4-0 PS-2 18 IN Y496G

## (undated) DEVICE — INTENDED FOR TISSUE SEPARATION, AND OTHER PROCEDURES THAT REQUIRE A SHARP SURGICAL BLADE TO PUNCTURE OR CUT.: Brand: BARD-PARKER ® CARBON RIB-BACK BLADES

## (undated) DEVICE — PADDING CAST 4 IN  COTTON STRL

## (undated) DEVICE — IMPERVIOUS STOCKINETTE: Brand: DEROYAL

## (undated) DEVICE — GLOVE INDICATOR PI UNDERGLOVE SZ 6.5 BLUE

## (undated) DEVICE — GLOVE SRG BIOGEL 7

## (undated) DEVICE — CHLORAPREP HI-LITE 26ML ORANGE

## (undated) DEVICE — STRETCH BANDAGE: Brand: CURITY

## (undated) DEVICE — SUT VICRYL 3-0 SH 27 IN J416H

## (undated) DEVICE — STERILE BETHLEHEM PLASTIC HAND: Brand: CARDINAL HEALTH

## (undated) DEVICE — GLOVE SRG BIOGEL 6.5

## (undated) DEVICE — NEEDLE 25G X 1 1/2

## (undated) DEVICE — CUFF TOURNIQUET 18 X 4 IN QUICK CONNECT DISP 1 BLADDER

## (undated) DEVICE — DISPOSABLE EQUIPMENT COVER: Brand: SMALL TOWEL DRAPE

## (undated) DEVICE — MINI BLADE ROUND TIP SHARP ON ONE SIDE

## (undated) DEVICE — GAUZE SPONGES,16 PLY: Brand: CURITY

## (undated) DEVICE — LIGHT HANDLE COVER SLEEVE DISP BLUE STELLAR

## (undated) DEVICE — SPONGE PVP SCRUB WING STERILE

## (undated) DEVICE — ACE WRAP 3 IN STERILE

## (undated) DEVICE — GLOVE INDICATOR PI UNDERGLOVE SZ 7 BLUE